# Patient Record
Sex: FEMALE | Race: WHITE | NOT HISPANIC OR LATINO | Employment: OTHER | URBAN - METROPOLITAN AREA
[De-identification: names, ages, dates, MRNs, and addresses within clinical notes are randomized per-mention and may not be internally consistent; named-entity substitution may affect disease eponyms.]

---

## 2017-01-06 ENCOUNTER — TRANSCRIBE ORDERS (OUTPATIENT)
Dept: LAB | Facility: CLINIC | Age: 67
End: 2017-01-06

## 2017-01-06 ENCOUNTER — APPOINTMENT (OUTPATIENT)
Dept: LAB | Facility: CLINIC | Age: 67
End: 2017-01-06
Payer: COMMERCIAL

## 2017-01-06 DIAGNOSIS — Q15.9 EYE ABNORMALITIES: Primary | ICD-10-CM

## 2017-01-06 LAB
ERYTHROCYTE [SEDIMENTATION RATE] IN BLOOD: 10 MM/HOUR (ref 0–20)
TSH SERPL DL<=0.05 MIU/L-ACNC: 1.37 UIU/ML (ref 0.36–3.74)

## 2017-01-06 PROCEDURE — 85652 RBC SED RATE AUTOMATED: CPT

## 2017-01-06 PROCEDURE — 84443 ASSAY THYROID STIM HORMONE: CPT

## 2017-01-06 PROCEDURE — 86618 LYME DISEASE ANTIBODY: CPT

## 2017-01-06 PROCEDURE — 36415 COLL VENOUS BLD VENIPUNCTURE: CPT

## 2017-01-09 LAB
B BURGDOR IGG SER IA-ACNC: 0.09
B BURGDOR IGM SER IA-ACNC: 0.43

## 2017-08-11 ENCOUNTER — TRANSCRIBE ORDERS (OUTPATIENT)
Dept: ADMINISTRATIVE | Facility: HOSPITAL | Age: 67
End: 2017-08-11

## 2017-08-11 DIAGNOSIS — K56.699 OTHER INTESTINAL OBSTRUCTION: Primary | ICD-10-CM

## 2017-09-05 ENCOUNTER — ALLSCRIPTS OFFICE VISIT (OUTPATIENT)
Dept: OTHER | Facility: OTHER | Age: 67
End: 2017-09-05

## 2017-09-05 DIAGNOSIS — Z78.0 ASYMPTOMATIC MENOPAUSAL STATE: ICD-10-CM

## 2017-09-05 DIAGNOSIS — Z12.31 ENCOUNTER FOR SCREENING MAMMOGRAM FOR MALIGNANT NEOPLASM OF BREAST: ICD-10-CM

## 2017-09-06 ENCOUNTER — HOSPITAL ENCOUNTER (OUTPATIENT)
Dept: CT IMAGING | Facility: HOSPITAL | Age: 67
Discharge: HOME | End: 2017-09-06
Payer: COMMERCIAL

## 2017-09-06 ENCOUNTER — HOSPITAL ENCOUNTER (OUTPATIENT)
Dept: RADIOLOGY | Facility: HOSPITAL | Age: 67
Discharge: HOME | End: 2017-09-06
Payer: COMMERCIAL

## 2017-09-06 DIAGNOSIS — K56.699 OTHER INTESTINAL OBSTRUCTION: ICD-10-CM

## 2017-09-06 DIAGNOSIS — K56.609 MECHANICAL ILEUS (HCC): ICD-10-CM

## 2017-09-28 ENCOUNTER — GENERIC CONVERSION - ENCOUNTER (OUTPATIENT)
Dept: OTHER | Facility: OTHER | Age: 67
End: 2017-09-28

## 2017-10-03 ENCOUNTER — GENERIC CONVERSION - ENCOUNTER (OUTPATIENT)
Dept: OTHER | Facility: OTHER | Age: 67
End: 2017-10-03

## 2018-01-13 VITALS
HEIGHT: 60 IN | BODY MASS INDEX: 29.76 KG/M2 | WEIGHT: 151.56 LBS | SYSTOLIC BLOOD PRESSURE: 138 MMHG | DIASTOLIC BLOOD PRESSURE: 88 MMHG

## 2018-09-14 ENCOUNTER — APPOINTMENT (OUTPATIENT)
Dept: LAB | Facility: HOSPITAL | Age: 68
End: 2018-09-14
Payer: MEDICARE

## 2018-09-14 ENCOUNTER — TRANSCRIBE ORDERS (OUTPATIENT)
Dept: ADMINISTRATIVE | Facility: HOSPITAL | Age: 68
End: 2018-09-14

## 2018-09-14 DIAGNOSIS — D50.9 IRON DEFICIENCY ANEMIA, UNSPECIFIED IRON DEFICIENCY ANEMIA TYPE: ICD-10-CM

## 2018-09-14 DIAGNOSIS — E78.5 HYPERLIPIDEMIA, UNSPECIFIED HYPERLIPIDEMIA TYPE: ICD-10-CM

## 2018-09-14 DIAGNOSIS — I25.119 ATHEROSCLEROSIS OF CORONARY ARTERY WITH ANGINA PECTORIS, UNSPECIFIED VESSEL OR LESION TYPE, UNSPECIFIED WHETHER NATIVE OR TRANSPLANTED HEART (HCC): ICD-10-CM

## 2018-09-14 DIAGNOSIS — R53.1 ASTHENIA: Primary | ICD-10-CM

## 2018-09-14 DIAGNOSIS — I50.9 CONGESTIVE HEART FAILURE, UNSPECIFIED HF CHRONICITY, UNSPECIFIED HEART FAILURE TYPE (HCC): ICD-10-CM

## 2018-09-14 DIAGNOSIS — E66.3 OVER WEIGHT: ICD-10-CM

## 2018-09-14 DIAGNOSIS — E55.9 VITAMIN D DEFICIENCY DISEASE: ICD-10-CM

## 2018-09-14 DIAGNOSIS — E34.9 ENDOCRINE DISTURBANCE: ICD-10-CM

## 2018-09-14 DIAGNOSIS — R53.81 MALAISE: ICD-10-CM

## 2018-09-14 DIAGNOSIS — R53.1 ASTHENIA: ICD-10-CM

## 2018-09-14 DIAGNOSIS — D64.9 ANEMIA, UNSPECIFIED TYPE: ICD-10-CM

## 2018-09-14 DIAGNOSIS — Z83.3 FAMILY HISTORY OF DIABETES MELLITUS: ICD-10-CM

## 2018-09-14 DIAGNOSIS — E88.9 METABOLIC DISORDER: ICD-10-CM

## 2018-09-14 DIAGNOSIS — Z13.228 SCREENING FOR METABOLIC DISORDER: ICD-10-CM

## 2018-09-14 DIAGNOSIS — R94.7 ABNORMAL RESULTS OF OTHER ENDOCRINE FUNCTION STUDIES: ICD-10-CM

## 2018-09-14 DIAGNOSIS — E03.9 HYPOTHYROIDISM, UNSPECIFIED TYPE: ICD-10-CM

## 2018-09-14 DIAGNOSIS — E78.49 OTHER HYPERLIPIDEMIA: ICD-10-CM

## 2018-09-14 DIAGNOSIS — R53.83 OTHER FATIGUE: ICD-10-CM

## 2018-09-14 DIAGNOSIS — Z13.1 SCREENING FOR DIABETES MELLITUS: ICD-10-CM

## 2018-09-14 DIAGNOSIS — Z13.21 ENCOUNTER FOR SCREENING FOR NUTRITIONAL DISORDER: ICD-10-CM

## 2018-09-14 DIAGNOSIS — R94.110: ICD-10-CM

## 2018-09-14 DIAGNOSIS — IMO0002 UNCONTROLLED TYPE 2 DIABETES MELLITUS WITH COMPLICATION, WITHOUT LONG-TERM CURRENT USE OF INSULIN: ICD-10-CM

## 2018-09-14 DIAGNOSIS — I10 ESSENTIAL HYPERTENSION, MALIGNANT: ICD-10-CM

## 2018-09-14 DIAGNOSIS — R73.01 IMPAIRED FASTING GLUCOSE: ICD-10-CM

## 2018-09-14 DIAGNOSIS — Z13.29 SCREENING FOR ENDOCRINE DISORDER: ICD-10-CM

## 2018-09-14 LAB
25(OH)D3 SERPL-MCNC: 26.6 NG/ML (ref 30–100)
ALBUMIN SERPL BCP-MCNC: 4.3 G/DL (ref 3.5–5.7)
ALP SERPL-CCNC: 61 U/L (ref 55–165)
ALT SERPL W P-5'-P-CCNC: 24 U/L (ref 7–52)
ANION GAP SERPL CALCULATED.3IONS-SCNC: 6 MMOL/L (ref 4–13)
AST SERPL W P-5'-P-CCNC: 22 U/L (ref 13–39)
BILIRUB SERPL-MCNC: 0.3 MG/DL (ref 0.2–1)
BUN SERPL-MCNC: 14 MG/DL (ref 7–25)
CALCIUM SERPL-MCNC: 10.1 MG/DL (ref 8.6–10.5)
CEA SERPL-MCNC: 2.8 NG/ML (ref 0–3)
CHLORIDE SERPL-SCNC: 103 MMOL/L (ref 98–107)
CHOLEST SERPL-MCNC: 194 MG/DL (ref 0–200)
CO2 SERPL-SCNC: 31 MMOL/L (ref 21–31)
CORTIS SERPL-MCNC: 7 UG/DL
CREAT SERPL-MCNC: 0.69 MG/DL (ref 0.6–1.2)
CRP SERPL HS-MCNC: 6.97 MG/L
DEPRECATED D DIMER PPP: 588 NG/ML (FEU)
ERYTHROCYTE [DISTWIDTH] IN BLOOD BY AUTOMATED COUNT: 15.4 % (ref 11.5–14.5)
ERYTHROCYTE [SEDIMENTATION RATE] IN BLOOD: 12 MM/HOUR (ref 0–30)
EST. AVERAGE GLUCOSE BLD GHB EST-MCNC: 111 MG/DL
FERRITIN SERPL-MCNC: 8 NG/ML (ref 8–388)
FOLATE SERPL-MCNC: >20 NG/ML (ref 3.1–17.5)
GFR SERPL CREATININE-BSD FRML MDRD: 92 ML/MIN/1.73SQ M
GLUCOSE P FAST SERPL-MCNC: 95 MG/DL (ref 65–99)
HBA1C MFR BLD: 5.5 % (ref 4.2–6.3)
HCT VFR BLD AUTO: 46 % (ref 34.8–46.1)
HCYS SERPL-SCNC: 9.4 UMOL/L (ref 3.7–11.2)
HDLC SERPL-MCNC: 66 MG/DL (ref 40–60)
HGB BLD-MCNC: 14.7 G/DL (ref 12–16)
INSULIN SERPL-ACNC: 11.2 MU/L (ref 3–25)
IRON SATN MFR SERPL: 15 %
IRON SERPL-MCNC: 56 UG/DL (ref 50–170)
LDLC SERPL CALC-MCNC: 100 MG/DL (ref 75–193)
LDLC SERPL DIRECT ASSAY-MCNC: 103 MG/DL (ref 75–193)
MCH RBC QN AUTO: 27.4 PG (ref 26–34)
MCHC RBC AUTO-ENTMCNC: 32 G/DL (ref 31–37)
MCV RBC AUTO: 86 FL (ref 81–99)
NONHDLC SERPL-MCNC: 128 MG/DL
PLATELET # BLD AUTO: 316 THOUSANDS/UL (ref 149–390)
PMV BLD AUTO: 9.5 FL (ref 8.6–11.7)
POTASSIUM SERPL-SCNC: 4.4 MMOL/L (ref 3.5–5.5)
PROT SERPL-MCNC: 7.2 G/DL (ref 6.4–8.9)
RBC # BLD AUTO: 5.38 MILLION/UL (ref 3.9–5.2)
SODIUM SERPL-SCNC: 140 MMOL/L (ref 134–143)
T3 SERPL-MCNC: 1.1 NG/ML (ref 0.6–1.8)
T3FREE SERPL-MCNC: 2.67 PG/ML (ref 2.3–4.2)
T4 SERPL-MCNC: 9.3 UG/DL (ref 4.7–13.3)
TIBC SERPL-MCNC: 379 UG/DL (ref 250–450)
TRIGL SERPL-MCNC: 140 MG/DL (ref 44–166)
TSH SERPL DL<=0.05 MIU/L-ACNC: 0.6 UIU/ML (ref 0.45–5.33)
URATE SERPL-MCNC: 4 MG/DL (ref 2.3–7.6)
VIT B12 SERPL-MCNC: 1988 PG/ML (ref 100–900)
WBC # BLD AUTO: 7.1 THOUSAND/UL (ref 4.8–10.8)

## 2018-09-14 PROCEDURE — 84436 ASSAY OF TOTAL THYROXINE: CPT

## 2018-09-14 PROCEDURE — 85652 RBC SED RATE AUTOMATED: CPT

## 2018-09-14 PROCEDURE — 83090 ASSAY OF HOMOCYSTEINE: CPT

## 2018-09-14 PROCEDURE — 83721 ASSAY OF BLOOD LIPOPROTEIN: CPT

## 2018-09-14 PROCEDURE — 82728 ASSAY OF FERRITIN: CPT

## 2018-09-14 PROCEDURE — 84481 FREE ASSAY (FT-3): CPT

## 2018-09-14 PROCEDURE — 84479 ASSAY OF THYROID (T3 OR T4): CPT

## 2018-09-14 PROCEDURE — 85027 COMPLETE CBC AUTOMATED: CPT

## 2018-09-14 PROCEDURE — 84270 ASSAY OF SEX HORMONE GLOBUL: CPT

## 2018-09-14 PROCEDURE — 80061 LIPID PANEL: CPT

## 2018-09-14 PROCEDURE — 82306 VITAMIN D 25 HYDROXY: CPT

## 2018-09-14 PROCEDURE — 83540 ASSAY OF IRON: CPT

## 2018-09-14 PROCEDURE — 83550 IRON BINDING TEST: CPT

## 2018-09-14 PROCEDURE — 84480 ASSAY TRIIODOTHYRONINE (T3): CPT

## 2018-09-14 PROCEDURE — 82533 TOTAL CORTISOL: CPT

## 2018-09-14 PROCEDURE — 80053 COMPREHEN METABOLIC PANEL: CPT

## 2018-09-14 PROCEDURE — 85379 FIBRIN DEGRADATION QUANT: CPT

## 2018-09-14 PROCEDURE — 83520 IMMUNOASSAY QUANT NOS NONAB: CPT

## 2018-09-14 PROCEDURE — 83525 ASSAY OF INSULIN: CPT

## 2018-09-14 PROCEDURE — 83036 HEMOGLOBIN GLYCOSYLATED A1C: CPT

## 2018-09-14 PROCEDURE — 86141 C-REACTIVE PROTEIN HS: CPT

## 2018-09-14 PROCEDURE — 84443 ASSAY THYROID STIM HORMONE: CPT

## 2018-09-14 PROCEDURE — 82378 CARCINOEMBRYONIC ANTIGEN: CPT

## 2018-09-14 PROCEDURE — 82607 VITAMIN B-12: CPT

## 2018-09-14 PROCEDURE — 84681 ASSAY OF C-PEPTIDE: CPT

## 2018-09-14 PROCEDURE — 82746 ASSAY OF FOLIC ACID SERUM: CPT

## 2018-09-14 PROCEDURE — 84550 ASSAY OF BLOOD/URIC ACID: CPT

## 2018-09-14 PROCEDURE — 36415 COLL VENOUS BLD VENIPUNCTURE: CPT

## 2018-09-15 LAB
C PEPTIDE SERPL-MCNC: 2.8 NG/ML (ref 1.1–4.4)
SHBG SERPL-SCNC: 108.6 NMOL/L (ref 17.3–125)
T3RU NFR SERPL: 28 % (ref 24–39)

## 2018-09-17 LAB — LEPTIN SERPL-MCNC: 42.4 NG/ML

## 2018-09-27 ENCOUNTER — ANNUAL EXAM (OUTPATIENT)
Dept: OBGYN CLINIC | Facility: CLINIC | Age: 68
End: 2018-09-27
Payer: MEDICARE

## 2018-09-27 VITALS
SYSTOLIC BLOOD PRESSURE: 146 MMHG | BODY MASS INDEX: 30.72 KG/M2 | DIASTOLIC BLOOD PRESSURE: 94 MMHG | HEIGHT: 61 IN | WEIGHT: 162.7 LBS

## 2018-09-27 DIAGNOSIS — Z01.419 ENCOUNTER FOR GYNECOLOGICAL EXAMINATION: Primary | ICD-10-CM

## 2018-09-27 DIAGNOSIS — Z12.31 ENCOUNTER FOR SCREENING MAMMOGRAM FOR MALIGNANT NEOPLASM OF BREAST: ICD-10-CM

## 2018-09-27 DIAGNOSIS — Z13.820 ENCOUNTER FOR SCREENING FOR OSTEOPOROSIS: ICD-10-CM

## 2018-09-27 PROCEDURE — G0101 CA SCREEN;PELVIC/BREAST EXAM: HCPCS | Performed by: OBSTETRICS & GYNECOLOGY

## 2018-09-27 RX ORDER — FLUTICASONE PROPIONATE 50 MCG
SPRAY, SUSPENSION (ML) NASAL
COMMUNITY
Start: 2016-01-13

## 2018-09-27 RX ORDER — CYCLOBENZAPRINE HCL 5 MG
TABLET ORAL
COMMUNITY
Start: 2016-02-08 | End: 2020-07-28 | Stop reason: ALTCHOICE

## 2018-09-27 RX ORDER — VITAMIN B COMPLEX
TABLET ORAL
COMMUNITY

## 2018-09-27 RX ORDER — FENOFIBRATE 130 MG/1
CAPSULE ORAL
COMMUNITY
End: 2020-07-28 | Stop reason: SDUPTHER

## 2018-09-27 RX ORDER — CHLORAL HYDRATE 500 MG
CAPSULE ORAL
COMMUNITY

## 2018-09-27 RX ORDER — ALENDRONATE SODIUM 70 MG/1
70 TABLET ORAL WEEKLY
COMMUNITY
End: 2020-07-28 | Stop reason: ALTCHOICE

## 2018-09-27 RX ORDER — BUTALBITAL, ACETAMINOPHEN AND CAFFEINE 50; 325; 40 MG/1; MG/1; MG/1
TABLET ORAL
COMMUNITY
Start: 2012-10-18 | End: 2020-07-28 | Stop reason: SDUPTHER

## 2018-09-27 RX ORDER — IBANDRONATE SODIUM 150 MG/1
150 TABLET, FILM COATED ORAL
Refills: 3 | COMMUNITY
Start: 2018-07-24 | End: 2020-11-03 | Stop reason: SDUPTHER

## 2018-09-27 RX ORDER — LORAZEPAM 1 MG/1
TABLET ORAL
COMMUNITY
Start: 2016-02-25 | End: 2019-11-01 | Stop reason: SDUPTHER

## 2018-09-27 RX ORDER — EZETIMIBE AND SIMVASTATIN 10; 40 MG/1; MG/1
TABLET ORAL
COMMUNITY
End: 2020-07-28 | Stop reason: SDUPTHER

## 2018-09-27 RX ORDER — CHOLECALCIFEROL (VITAMIN D3) 10 MCG (400 UNIT) TABLET: COMMUNITY

## 2018-09-27 RX ORDER — NAPROXEN 500 MG/1
TABLET ORAL
COMMUNITY
Start: 2016-02-08

## 2018-09-27 RX ORDER — MONTELUKAST SODIUM 10 MG/1
TABLET ORAL AS NEEDED
COMMUNITY
Start: 2016-01-13 | End: 2022-01-26 | Stop reason: ALTCHOICE

## 2018-09-27 RX ORDER — PHENTERMINE HYDROCHLORIDE 37.5 MG/1
TABLET ORAL
Refills: 3 | COMMUNITY
Start: 2018-09-04 | End: 2020-07-28 | Stop reason: SDUPTHER

## 2018-09-27 RX ORDER — SUMATRIPTAN 100 MG/1
100 TABLET, FILM COATED ORAL
COMMUNITY
Start: 2012-12-18 | End: 2020-07-28 | Stop reason: ALTCHOICE

## 2018-09-27 RX ORDER — LEVOTHYROXINE SODIUM 0.05 MG/1
50 TABLET ORAL
COMMUNITY
End: 2020-07-28 | Stop reason: SDUPTHER

## 2018-09-27 NOTE — PROGRESS NOTES
ASSESSMENT & PLAN: Eboni Seen is a 74 y o  Dov Mares with normal gynecologic exam     1   Routine well woman exam done today  2  Pap and HPV:  The patient's last pap and hpv was years ago   It was normal     Pap and cotesting was not done today  Current ASCCP Guidelines reviewed  3   Mammogram ordered  4  Colonoscopy   5  DEXA ordered  6  The following were reviewed in today's visit: breast self exam, mammography screening ordered, menopause, osteoporosis, exercise and healthy diet  CC:  Annual Gynecologic Examination    HPI: Eboni Seen is a 74 y o  Dov Mares who presents for annual gynecologic examination  She has the following concerns:  None     Health Maintenance:    She wears her seatbelt routinely  She does perform regular monthly self breast exams  She feels safe at home  History reviewed  No pertinent past medical history  Past Surgical History:   Procedure Laterality Date    LEG SURGERY      TOTAL ABDOMINAL HYSTERECTOMY W/ BILATERAL SALPINGOOPHORECTOMY         Past OB/Gyn History:  OB History      Para Term  AB Living    0 0 0 0 0 0    SAB TAB Ectopic Multiple Live Births    0 0 0 0 0          History reviewed  No pertinent family history  Social History:  Social History     Social History    Marital status: /Civil Union     Spouse name: N/A    Number of children: N/A    Years of education: N/A     Occupational History    Not on file       Social History Main Topics    Smoking status: Never Smoker    Smokeless tobacco: Never Used    Alcohol use No    Drug use: No    Sexual activity: Yes     Partners: Male     Other Topics Concern    Not on file     Social History Narrative    No narrative on file         Allergies no known allergies      Current Outpatient Prescriptions:     butalbital-acetaminophen-caffeine (FIORICET,ESGIC) -40 mg per tablet, prn for HA, Disp: , Rfl:     cyclobenzaprine (FLEXERIL) 5 mg tablet, , Disp: , Rfl:     fluticasone (FLONASE) 50 mcg/act nasal spray, , Disp: , Rfl:     LORazepam (ATIVAN) 1 mg tablet, , Disp: , Rfl:     montelukast (SINGULAIR) 10 mg tablet, , Disp: , Rfl:     Multiple Vitamin (MULTI-VITAMIN DAILY PO), once daily, Disp: , Rfl:     naproxen (NAPROSYN) 500 mg tablet, , Disp: , Rfl:     nicotine polacrilex (NICORETTE) 2 mg gum, 2mg per piece 6-8 pieces daily, Disp: , Rfl:     SUMAtriptan (IMITREX) 100 mg tablet, Take 100 mg by mouth, Disp: , Rfl:     alendronate (FOSAMAX) 70 mg tablet, Take 70 mg by mouth Once a week, Disp: , Rfl:     aspirin (ECOTRIN) 325 mg EC tablet, Take by mouth, Disp: , Rfl:     Calcium 200 MG TABS, Take by mouth, Disp: , Rfl:     Cholecalciferol (VITAMIN D PO), Take by mouth, Disp: , Rfl:     Coenzyme Q10 (COQ10) 100 MG CAPS, Take by mouth, Disp: , Rfl:     ezetimibe-simvastatin (VYTORIN) 10-40 mg per tablet, Take by mouth, Disp: , Rfl:     fenofibrate micronized (ANTARA) 130 MG capsule, Take by mouth, Disp: , Rfl:     ibandronate (BONIVA) 150 MG tablet, , Disp: , Rfl: 3    levothyroxine (SYNTHROID) 137 mcg tablet, Take by mouth, Disp: , Rfl:     Magnesium 100 MG TABS, Take by mouth, Disp: , Rfl:     Omega-3 1000 MG CAPS, Take by mouth, Disp: , Rfl:     phentermine (ADIPEX-P) 37 5 MG tablet, , Disp: , Rfl: 3    Potassium Gluconate 80 MG TABS, Take by mouth, Disp: , Rfl:     Review of Systems  Constitutional :no fever, feels well, no tiredness, no recent weight gain or loss  ENT: no ear ache, no loss of hearing, no nosebleeds or nasal discharge, no sore throat or hoarseness  Cardiovascular: no complaints of slow or fast heart beat, no chest pain, no palpitations, no leg claudication or lower extremity edema    Respiratory: no complaints of shortness of shortness of breath, no HUTCHISON  Breasts:no complaints of breast pain, breast lump, or nipple discharge  Gastrointestinal: no complaints of abdominal pain, constipation, nausea, vomiting, or diarrhea or bloody stools  Genitourinary : no complaints of dysuria, incontinence, pelvic pain, no dysmenorrhea, vaginal discharge or abnormal vaginal bleeding and as noted in HPI  Musculoskeletal: no complaints of arthralgia, no myalgia, no joint swelling or stiffness, no limb pain or swelling  Integumentary: no complaints of skin rash or lesion, itching or dry skin  Neurological: no complaints of headache, no confusion, no numbness or tingling, no dizziness or fainting    Objective      /94   Ht 5' 0 5" (1 537 m)   Wt 73 8 kg (162 lb 11 2 oz)   Breastfeeding? No   BMI 31 25 kg/m²   General:   appears stated age, cooperative, alert normal mood and affect   Breasts: normal appearance, no masses or tenderness, Normal to palpation without dominant masses   Abdomen: soft, non-tender, without masses or organomegaly   Vulva: normal   Vagina: normal vagina, no discharge, exudate, lesion, or erythema   Urethra: normal   Cervix: Absent     Uterus: uterus absent   Adnexa: no mass, fullness, tenderness   Psychiatric orientation to person, place, and time: normal  mood and affect: normal

## 2018-09-28 ENCOUNTER — HOSPITAL ENCOUNTER (OUTPATIENT)
Dept: BONE DENSITY | Facility: HOSPITAL | Age: 68
Discharge: HOME | End: 2018-09-28
Payer: MEDICARE

## 2018-09-28 DIAGNOSIS — Z78.0 ASYMPTOMATIC MENOPAUSAL STATE: ICD-10-CM

## 2018-09-28 PROCEDURE — 77080 DXA BONE DENSITY AXIAL: CPT

## 2018-11-09 ENCOUNTER — HOSPITAL ENCOUNTER (OUTPATIENT)
Dept: MAMMOGRAPHY | Facility: HOSPITAL | Age: 68
Discharge: HOME | End: 2018-11-09
Payer: MEDICARE

## 2018-11-09 DIAGNOSIS — Z12.31 ENCOUNTER FOR SCREENING MAMMOGRAM FOR MALIGNANT NEOPLASM OF BREAST: ICD-10-CM

## 2018-11-09 PROCEDURE — 77067 SCR MAMMO BI INCL CAD: CPT

## 2018-11-09 PROCEDURE — 77063 BREAST TOMOSYNTHESIS BI: CPT

## 2019-01-07 ENCOUNTER — APPOINTMENT (OUTPATIENT)
Dept: LAB | Facility: HOSPITAL | Age: 69
End: 2019-01-07
Payer: MEDICARE

## 2019-01-07 ENCOUNTER — TRANSCRIBE ORDERS (OUTPATIENT)
Dept: ADMINISTRATIVE | Facility: HOSPITAL | Age: 69
End: 2019-01-07

## 2019-01-07 DIAGNOSIS — Z13.1 SCREENING FOR DIABETES MELLITUS: ICD-10-CM

## 2019-01-07 DIAGNOSIS — I25.10 ATHEROSCLEROSIS OF NATIVE CORONARY ARTERY WITHOUT ANGINA PECTORIS, UNSPECIFIED WHETHER NATIVE OR TRANSPLANTED HEART: ICD-10-CM

## 2019-01-07 DIAGNOSIS — E55.9 VITAMIN D DEFICIENCY DISEASE: ICD-10-CM

## 2019-01-07 DIAGNOSIS — Z13.21 SCREENING FOR MALNUTRITION: ICD-10-CM

## 2019-01-07 DIAGNOSIS — E11.8 TYPE 2 DIABETES MELLITUS WITH COMPLICATION, UNSPECIFIED WHETHER LONG TERM INSULIN USE: ICD-10-CM

## 2019-01-07 DIAGNOSIS — E34.9 ENDOCRINE DISEASE: ICD-10-CM

## 2019-01-07 DIAGNOSIS — D50.9 IRON DEFICIENCY ANEMIA, UNSPECIFIED IRON DEFICIENCY ANEMIA TYPE: ICD-10-CM

## 2019-01-07 DIAGNOSIS — R79.89 ELEVATED D-DIMER: ICD-10-CM

## 2019-01-07 DIAGNOSIS — E66.3 OVER WEIGHT: ICD-10-CM

## 2019-01-07 DIAGNOSIS — D64.9 ANEMIA, UNSPECIFIED TYPE: ICD-10-CM

## 2019-01-07 DIAGNOSIS — R53.81 MALAISE: ICD-10-CM

## 2019-01-07 DIAGNOSIS — R94.7 ABNORMAL RESULTS OF OTHER ENDOCRINE FUNCTION STUDIES: ICD-10-CM

## 2019-01-07 DIAGNOSIS — E78.41 ELEVATED LIPOPROTEIN A LEVEL: ICD-10-CM

## 2019-01-07 DIAGNOSIS — E78.5 HYPERLIPIDEMIA, UNSPECIFIED HYPERLIPIDEMIA TYPE: ICD-10-CM

## 2019-01-07 DIAGNOSIS — R53.1 WEAKNESS GENERALIZED: ICD-10-CM

## 2019-01-07 DIAGNOSIS — Z13.228 ENCOUNTER FOR SCREENING FOR OTHER METABOLIC DISORDERS: ICD-10-CM

## 2019-01-07 DIAGNOSIS — I10 HYPERTENSION, UNSPECIFIED TYPE: ICD-10-CM

## 2019-01-07 DIAGNOSIS — R53.83 FATIGUE, UNSPECIFIED TYPE: ICD-10-CM

## 2019-01-07 DIAGNOSIS — E88.9 METABOLIC DISORDER: ICD-10-CM

## 2019-01-07 DIAGNOSIS — R53.1 WEAKNESS GENERALIZED: Primary | ICD-10-CM

## 2019-01-07 DIAGNOSIS — I50.9 HEART FAILURE, UNSPECIFIED HF CHRONICITY, UNSPECIFIED HEART FAILURE TYPE (HCC): ICD-10-CM

## 2019-01-07 DIAGNOSIS — R94.6 NONSPECIFIC ABNORMAL RESULTS OF THYROID FUNCTION STUDY: ICD-10-CM

## 2019-01-07 DIAGNOSIS — E03.9 ACQUIRED HYPOTHYROIDISM: ICD-10-CM

## 2019-01-07 DIAGNOSIS — R79.82 CRP ELEVATED: ICD-10-CM

## 2019-01-07 LAB
25(OH)D3 SERPL-MCNC: 34.9 NG/ML (ref 30–100)
ALBUMIN SERPL BCP-MCNC: 4.4 G/DL (ref 3.5–5.7)
ALP SERPL-CCNC: 67 U/L (ref 55–165)
ALT SERPL W P-5'-P-CCNC: 22 U/L (ref 7–52)
ANION GAP SERPL CALCULATED.3IONS-SCNC: 9 MMOL/L (ref 4–13)
AST SERPL W P-5'-P-CCNC: 18 U/L (ref 13–39)
BASOPHILS # BLD AUTO: 0 THOUSANDS/ΜL (ref 0–0.1)
BASOPHILS NFR BLD AUTO: 0 % (ref 0–2)
BILIRUB SERPL-MCNC: 0.4 MG/DL (ref 0.2–1)
BUN SERPL-MCNC: 13 MG/DL (ref 7–25)
CALCIUM SERPL-MCNC: 9.9 MG/DL (ref 8.6–10.5)
CEA SERPL-MCNC: 2.3 NG/ML (ref 0–3)
CHLORIDE SERPL-SCNC: 106 MMOL/L (ref 98–107)
CHOLEST SERPL-MCNC: 151 MG/DL (ref 0–200)
CO2 SERPL-SCNC: 27 MMOL/L (ref 21–31)
CORTIS SERPL-MCNC: 22 UG/DL
CREAT SERPL-MCNC: 0.82 MG/DL (ref 0.6–1.2)
CRP SERPL HS-MCNC: 3.76 MG/L
DEPRECATED D DIMER PPP: 669 NG/ML (FEU)
EOSINOPHIL # BLD AUTO: 0.2 THOUSAND/ΜL (ref 0–0.61)
EOSINOPHIL NFR BLD AUTO: 3 % (ref 0–5)
ERYTHROCYTE [DISTWIDTH] IN BLOOD BY AUTOMATED COUNT: 16.5 % (ref 11.5–14.5)
ERYTHROCYTE [SEDIMENTATION RATE] IN BLOOD: 8 MM/HOUR (ref 0–30)
EST. AVERAGE GLUCOSE BLD GHB EST-MCNC: 123 MG/DL
FERRITIN SERPL-MCNC: 11 NG/ML (ref 8–388)
FOLATE SERPL-MCNC: >20 NG/ML (ref 3.1–17.5)
GFR SERPL CREATININE-BSD FRML MDRD: 75 ML/MIN/1.73SQ M
GLUCOSE P FAST SERPL-MCNC: 87 MG/DL (ref 65–99)
HBA1C MFR BLD: 5.9 % (ref 4.2–6.3)
HCT VFR BLD AUTO: 45.2 % (ref 34.8–46.1)
HCYS SERPL-SCNC: 8.5 UMOL/L (ref 3.7–11.2)
HDLC SERPL-MCNC: 61 MG/DL (ref 40–60)
HGB BLD-MCNC: 14.7 G/DL (ref 12–16)
INSULIN SERPL-ACNC: 8.2 MU/L (ref 3–25)
IRON SERPL-MCNC: 108 UG/DL (ref 50–170)
LDLC SERPL CALC-MCNC: 62 MG/DL (ref 75–193)
LDLC SERPL DIRECT ASSAY-MCNC: 64 MG/DL (ref 75–193)
LG PLATELETS BLD QL SMEAR: PRESENT
LYMPHOCYTES # BLD AUTO: 1.4 THOUSANDS/ΜL (ref 0.6–4.47)
LYMPHOCYTES NFR BLD AUTO: 21 % (ref 21–51)
MCH RBC QN AUTO: 28.5 PG (ref 26–34)
MCHC RBC AUTO-ENTMCNC: 32.6 G/DL (ref 31–37)
MCV RBC AUTO: 87 FL (ref 81–99)
MONOCYTES # BLD AUTO: 0.5 THOUSAND/ΜL (ref 0.17–1.22)
MONOCYTES NFR BLD AUTO: 7 % (ref 2–12)
NEUTROPHILS # BLD AUTO: 4.4 THOUSANDS/ΜL (ref 1.4–6.5)
NEUTS SEG NFR BLD AUTO: 68 % (ref 42–75)
NONHDLC SERPL-MCNC: 90 MG/DL
NRBC BLD AUTO-RTO: 0 /100 WBCS
PLATELET # BLD AUTO: 301 THOUSANDS/UL (ref 149–390)
PLATELET BLD QL SMEAR: ADEQUATE
PMV BLD AUTO: 9.7 FL (ref 8.6–11.7)
POTASSIUM SERPL-SCNC: 3.6 MMOL/L (ref 3.5–5.5)
PROT SERPL-MCNC: 7.3 G/DL (ref 6.4–8.9)
RBC # BLD AUTO: 5.17 MILLION/UL (ref 3.9–5.2)
RBC MORPH BLD: NORMAL
SODIUM SERPL-SCNC: 142 MMOL/L (ref 134–143)
T3 SERPL-MCNC: 1 NG/ML (ref 0.6–1.8)
T3FREE SERPL-MCNC: 2.75 PG/ML (ref 2.3–4.2)
T4 FREE SERPL-MCNC: 0.97 NG/DL (ref 0.76–1.46)
T4 SERPL-MCNC: 8.2 UG/DL (ref 4.7–13.3)
TRIGL SERPL-MCNC: 142 MG/DL (ref 44–166)
TSH SERPL DL<=0.05 MIU/L-ACNC: 1.77 UIU/ML (ref 0.45–5.33)
URATE SERPL-MCNC: 3.5 MG/DL (ref 2.3–7.6)
VIT B12 SERPL-MCNC: 2398 PG/ML (ref 100–900)
WBC # BLD AUTO: 6.4 THOUSAND/UL (ref 4.8–10.8)

## 2019-01-07 PROCEDURE — 84479 ASSAY OF THYROID (T3 OR T4): CPT

## 2019-01-07 PROCEDURE — 80061 LIPID PANEL: CPT

## 2019-01-07 PROCEDURE — 84681 ASSAY OF C-PEPTIDE: CPT

## 2019-01-07 PROCEDURE — 84270 ASSAY OF SEX HORMONE GLOBUL: CPT

## 2019-01-07 PROCEDURE — 84443 ASSAY THYROID STIM HORMONE: CPT

## 2019-01-07 PROCEDURE — 36415 COLL VENOUS BLD VENIPUNCTURE: CPT

## 2019-01-07 PROCEDURE — 82306 VITAMIN D 25 HYDROXY: CPT

## 2019-01-07 PROCEDURE — 85025 COMPLETE CBC W/AUTO DIFF WBC: CPT

## 2019-01-07 PROCEDURE — 83036 HEMOGLOBIN GLYCOSYLATED A1C: CPT

## 2019-01-07 PROCEDURE — 83090 ASSAY OF HOMOCYSTEINE: CPT

## 2019-01-07 PROCEDURE — 82728 ASSAY OF FERRITIN: CPT

## 2019-01-07 PROCEDURE — 82607 VITAMIN B-12: CPT

## 2019-01-07 PROCEDURE — 85379 FIBRIN DEGRADATION QUANT: CPT

## 2019-01-07 PROCEDURE — 83520 IMMUNOASSAY QUANT NOS NONAB: CPT

## 2019-01-07 PROCEDURE — 84550 ASSAY OF BLOOD/URIC ACID: CPT

## 2019-01-07 PROCEDURE — 83540 ASSAY OF IRON: CPT

## 2019-01-07 PROCEDURE — 82378 CARCINOEMBRYONIC ANTIGEN: CPT

## 2019-01-07 PROCEDURE — 84481 FREE ASSAY (FT-3): CPT

## 2019-01-07 PROCEDURE — 86141 C-REACTIVE PROTEIN HS: CPT

## 2019-01-07 PROCEDURE — 80053 COMPREHEN METABOLIC PANEL: CPT

## 2019-01-07 PROCEDURE — 84480 ASSAY TRIIODOTHYRONINE (T3): CPT

## 2019-01-07 PROCEDURE — 85652 RBC SED RATE AUTOMATED: CPT

## 2019-01-07 PROCEDURE — 82533 TOTAL CORTISOL: CPT

## 2019-01-07 PROCEDURE — 84439 ASSAY OF FREE THYROXINE: CPT

## 2019-01-07 PROCEDURE — 83721 ASSAY OF BLOOD LIPOPROTEIN: CPT

## 2019-01-07 PROCEDURE — 83525 ASSAY OF INSULIN: CPT

## 2019-01-07 PROCEDURE — 82746 ASSAY OF FOLIC ACID SERUM: CPT

## 2019-01-08 LAB
C PEPTIDE SERPL-MCNC: 2.4 NG/ML (ref 1.1–4.4)
SHBG SERPL-SCNC: 125.9 NMOL/L (ref 17.3–125)
T3RU NFR SERPL: 30 % (ref 24–39)

## 2019-01-09 LAB — LEPTIN SERPL-MCNC: 33.9 NG/ML

## 2019-01-17 ENCOUNTER — HOSPITAL ENCOUNTER (OUTPATIENT)
Dept: NON INVASIVE DIAGNOSTICS | Facility: HOSPITAL | Age: 69
Discharge: HOME | End: 2019-01-17
Payer: MEDICARE

## 2019-01-17 DIAGNOSIS — R79.89 ELEVATED D-DIMER: ICD-10-CM

## 2019-01-17 PROCEDURE — 93970 EXTREMITY STUDY: CPT | Performed by: SURGERY

## 2019-01-17 PROCEDURE — 93970 EXTREMITY STUDY: CPT

## 2019-01-23 LAB
Lab: NORMAL
MISCELLANEOUS LAB TEST RESULT: NORMAL

## 2019-04-11 ENCOUNTER — APPOINTMENT (OUTPATIENT)
Dept: LAB | Facility: HOSPITAL | Age: 69
End: 2019-04-11
Payer: MEDICARE

## 2019-04-11 ENCOUNTER — TRANSCRIBE ORDERS (OUTPATIENT)
Dept: ADMINISTRATIVE | Facility: HOSPITAL | Age: 69
End: 2019-04-11

## 2019-04-11 DIAGNOSIS — R63.5 ABNORMAL WEIGHT GAIN: ICD-10-CM

## 2019-04-11 DIAGNOSIS — E66.3 OVER WEIGHT: ICD-10-CM

## 2019-04-11 DIAGNOSIS — Z13.1 SCREENING FOR DIABETES MELLITUS: ICD-10-CM

## 2019-04-11 DIAGNOSIS — R94.7 NONSPECIFIC ABNORMAL RESULTS OF ENDOCRINE FUNCTION STUDY: ICD-10-CM

## 2019-04-11 DIAGNOSIS — Z13.228 SCREENING FOR METABOLIC DISORDER: ICD-10-CM

## 2019-04-11 DIAGNOSIS — E66.9 OBESITY, UNSPECIFIED CLASSIFICATION, UNSPECIFIED OBESITY TYPE, UNSPECIFIED WHETHER SERIOUS COMORBIDITY PRESENT: ICD-10-CM

## 2019-04-11 DIAGNOSIS — E11.8 DIABETIC COMPLICATION (HCC): ICD-10-CM

## 2019-04-11 DIAGNOSIS — Z13.21 ENCOUNTER FOR SCREENING FOR NUTRITIONAL DISORDER: ICD-10-CM

## 2019-04-11 DIAGNOSIS — E88.9 METABOLIC DISORDER: ICD-10-CM

## 2019-04-11 DIAGNOSIS — D50.9 IRON DEFICIENCY ANEMIA, UNSPECIFIED IRON DEFICIENCY ANEMIA TYPE: ICD-10-CM

## 2019-04-11 DIAGNOSIS — R53.81 MALAISE: Primary | ICD-10-CM

## 2019-04-11 DIAGNOSIS — D64.9 ANEMIA, UNSPECIFIED TYPE: ICD-10-CM

## 2019-04-11 DIAGNOSIS — R73.09 OTHER ABNORMAL GLUCOSE: ICD-10-CM

## 2019-04-11 DIAGNOSIS — I25.10 ATHEROSCLEROSIS OF CORONARY ARTERY WITHOUT ANGINA PECTORIS, UNSPECIFIED VESSEL OR LESION TYPE, UNSPECIFIED WHETHER NATIVE OR TRANSPLANTED HEART: ICD-10-CM

## 2019-04-11 DIAGNOSIS — E78.5 HYPERLIPIDEMIA, UNSPECIFIED HYPERLIPIDEMIA TYPE: ICD-10-CM

## 2019-04-11 DIAGNOSIS — Z83.3 FAMILY HISTORY OF DIABETES MELLITUS: ICD-10-CM

## 2019-04-11 DIAGNOSIS — R73.01 IMPAIRED FASTING GLUCOSE: ICD-10-CM

## 2019-04-11 DIAGNOSIS — E78.49 OTHER HYPERLIPIDEMIA: ICD-10-CM

## 2019-04-11 DIAGNOSIS — R74.8 ABNORMAL LEVELS OF OTHER SERUM ENZYMES: ICD-10-CM

## 2019-04-11 DIAGNOSIS — E34.9 ENDOCRINE PROBLEM: ICD-10-CM

## 2019-04-11 DIAGNOSIS — E03.9 HYPOTHYROIDISM, ADULT: ICD-10-CM

## 2019-04-11 DIAGNOSIS — R94.6 NONSPECIFIC ABNORMAL RESULTS OF THYROID FUNCTION STUDY: ICD-10-CM

## 2019-04-11 DIAGNOSIS — R53.81 MALAISE: ICD-10-CM

## 2019-04-11 DIAGNOSIS — R53.83 FATIGUE, UNSPECIFIED TYPE: ICD-10-CM

## 2019-04-11 DIAGNOSIS — R79.82 ELEVATED C-REACTIVE PROTEIN (CRP): ICD-10-CM

## 2019-04-11 DIAGNOSIS — E55.9 VITAMIN D DEFICIENCY, UNSPECIFIED: ICD-10-CM

## 2019-04-11 DIAGNOSIS — Z79.01 LONG TERM (CURRENT) USE OF ANTICOAGULANTS: ICD-10-CM

## 2019-04-11 LAB
25(OH)D3 SERPL-MCNC: 41.1 NG/ML (ref 30–100)
ALBUMIN SERPL BCP-MCNC: 4.3 G/DL (ref 3.5–5.7)
ALP SERPL-CCNC: 55 U/L (ref 55–165)
ALT SERPL W P-5'-P-CCNC: 21 U/L (ref 7–52)
AMYLASE SERPL-CCNC: 48 IU/L (ref 29–103)
ANION GAP SERPL CALCULATED.3IONS-SCNC: 7 MMOL/L (ref 4–13)
AST SERPL W P-5'-P-CCNC: 18 U/L (ref 13–39)
BILIRUB SERPL-MCNC: 0.4 MG/DL (ref 0.2–1)
BNP SERPL-MCNC: 24 PG/ML (ref 1–100)
BUN SERPL-MCNC: 19 MG/DL (ref 7–25)
CALCIUM SERPL-MCNC: 9.6 MG/DL (ref 8.6–10.5)
CHLORIDE SERPL-SCNC: 104 MMOL/L (ref 98–107)
CHOLEST SERPL-MCNC: 139 MG/DL (ref 0–200)
CO2 SERPL-SCNC: 28 MMOL/L (ref 21–31)
CREAT SERPL-MCNC: 0.82 MG/DL (ref 0.6–1.2)
CRP SERPL HS-MCNC: 5.27 MG/L
DEPRECATED D DIMER PPP: 506 NG/ML (FEU)
ERYTHROCYTE [DISTWIDTH] IN BLOOD BY AUTOMATED COUNT: 14.9 % (ref 11.5–14.5)
ERYTHROCYTE [SEDIMENTATION RATE] IN BLOOD: 4 MM/HOUR (ref 0–30)
EST. AVERAGE GLUCOSE BLD GHB EST-MCNC: 103 MG/DL
FERRITIN SERPL-MCNC: 15 NG/ML (ref 8–388)
FOLATE SERPL-MCNC: >20 NG/ML (ref 3.1–17.5)
GFR SERPL CREATININE-BSD FRML MDRD: 75 ML/MIN/1.73SQ M
GLUCOSE P FAST SERPL-MCNC: 94 MG/DL (ref 65–99)
HBA1C MFR BLD: 5.2 % (ref 4.2–6.3)
HCT VFR BLD AUTO: 46.7 % (ref 42–47)
HCYS SERPL-SCNC: 7.9 UMOL/L (ref 3.7–11.2)
HDLC SERPL-MCNC: 62 MG/DL (ref 40–60)
HGB BLD-MCNC: 15.6 G/DL (ref 12–16)
INSULIN SERPL-ACNC: 7.1 MU/L (ref 3–25)
IRON SERPL-MCNC: 126 UG/DL (ref 50–170)
LDLC SERPL CALC-MCNC: 62 MG/DL (ref 0–100)
LDLC SERPL DIRECT ASSAY-MCNC: 57.8 MG/DL (ref 0–100)
MCH RBC QN AUTO: 30.1 PG (ref 26–34)
MCHC RBC AUTO-ENTMCNC: 33.4 G/DL (ref 31–37)
MCV RBC AUTO: 90 FL (ref 81–99)
NONHDLC SERPL-MCNC: 77 MG/DL
PLATELET # BLD AUTO: 257 THOUSANDS/UL (ref 149–390)
PMV BLD AUTO: 9.2 FL (ref 8.6–11.7)
POTASSIUM SERPL-SCNC: 4.5 MMOL/L (ref 3.5–5.5)
PROT SERPL-MCNC: 6.8 G/DL (ref 6.4–8.9)
RBC # BLD AUTO: 5.18 MILLION/UL (ref 3.9–5.2)
SODIUM SERPL-SCNC: 139 MMOL/L (ref 134–143)
T3 SERPL-MCNC: 1 NG/ML (ref 0.6–1.8)
T3FREE SERPL-MCNC: 2.84 PG/ML (ref 2.3–4.2)
T4 FREE SERPL-MCNC: 1.01 NG/DL (ref 0.76–1.46)
T4 SERPL-MCNC: 7.7 UG/DL (ref 4.7–13.3)
TRIGL SERPL-MCNC: 76 MG/DL (ref 44–166)
TSH SERPL DL<=0.05 MIU/L-ACNC: 1.01 UIU/ML (ref 0.45–5.33)
URATE SERPL-MCNC: 4.2 MG/DL (ref 2.3–7.6)
VIT B12 SERPL-MCNC: 1952 PG/ML (ref 100–900)
WBC # BLD AUTO: 5.8 THOUSAND/UL (ref 4.8–10.8)

## 2019-04-11 PROCEDURE — 83036 HEMOGLOBIN GLYCOSYLATED A1C: CPT

## 2019-04-11 PROCEDURE — 85027 COMPLETE CBC AUTOMATED: CPT

## 2019-04-11 PROCEDURE — 80061 LIPID PANEL: CPT

## 2019-04-11 PROCEDURE — 84550 ASSAY OF BLOOD/URIC ACID: CPT

## 2019-04-11 PROCEDURE — 83520 IMMUNOASSAY QUANT NOS NONAB: CPT

## 2019-04-11 PROCEDURE — 82607 VITAMIN B-12: CPT

## 2019-04-11 PROCEDURE — 82306 VITAMIN D 25 HYDROXY: CPT

## 2019-04-11 PROCEDURE — 84479 ASSAY OF THYROID (T3 OR T4): CPT

## 2019-04-11 PROCEDURE — 84481 FREE ASSAY (FT-3): CPT

## 2019-04-11 PROCEDURE — 83721 ASSAY OF BLOOD LIPOPROTEIN: CPT

## 2019-04-11 PROCEDURE — 83880 ASSAY OF NATRIURETIC PEPTIDE: CPT

## 2019-04-11 PROCEDURE — 83090 ASSAY OF HOMOCYSTEINE: CPT

## 2019-04-11 PROCEDURE — 85379 FIBRIN DEGRADATION QUANT: CPT

## 2019-04-11 PROCEDURE — 85652 RBC SED RATE AUTOMATED: CPT

## 2019-04-11 PROCEDURE — 83525 ASSAY OF INSULIN: CPT

## 2019-04-11 PROCEDURE — 84480 ASSAY TRIIODOTHYRONINE (T3): CPT

## 2019-04-11 PROCEDURE — 82728 ASSAY OF FERRITIN: CPT

## 2019-04-11 PROCEDURE — 36415 COLL VENOUS BLD VENIPUNCTURE: CPT

## 2019-04-11 PROCEDURE — 86141 C-REACTIVE PROTEIN HS: CPT

## 2019-04-11 PROCEDURE — 84439 ASSAY OF FREE THYROXINE: CPT

## 2019-04-11 PROCEDURE — 84681 ASSAY OF C-PEPTIDE: CPT

## 2019-04-11 PROCEDURE — 83540 ASSAY OF IRON: CPT

## 2019-04-11 PROCEDURE — 82150 ASSAY OF AMYLASE: CPT

## 2019-04-11 PROCEDURE — 80053 COMPREHEN METABOLIC PANEL: CPT

## 2019-04-11 PROCEDURE — 82746 ASSAY OF FOLIC ACID SERUM: CPT

## 2019-04-11 PROCEDURE — 84443 ASSAY THYROID STIM HORMONE: CPT

## 2019-04-12 LAB
C PEPTIDE SERPL-MCNC: 2.5 NG/ML (ref 1.1–4.4)
T3RU NFR SERPL: 31 % (ref 24–39)

## 2019-04-15 LAB — LEPTIN SERPL-MCNC: 34.3 NG/ML

## 2019-04-21 ENCOUNTER — HOSPITAL ENCOUNTER (EMERGENCY)
Facility: HOSPITAL | Age: 69
Discharge: HOME | End: 2019-04-21
Attending: EMERGENCY MEDICINE | Admitting: EMERGENCY MEDICINE
Payer: MEDICARE

## 2019-04-21 ENCOUNTER — APPOINTMENT (EMERGENCY)
Dept: RADIOLOGY | Facility: HOSPITAL | Age: 69
End: 2019-04-21
Payer: MEDICARE

## 2019-04-21 VITALS
OXYGEN SATURATION: 96 % | RESPIRATION RATE: 21 BRPM | HEIGHT: 62 IN | BODY MASS INDEX: 28.52 KG/M2 | WEIGHT: 155 LBS | HEART RATE: 92 BPM | SYSTOLIC BLOOD PRESSURE: 163 MMHG | TEMPERATURE: 97.9 F | DIASTOLIC BLOOD PRESSURE: 75 MMHG

## 2019-04-21 DIAGNOSIS — S62.609A FINGER FRACTURE, RIGHT: Primary | ICD-10-CM

## 2019-04-21 PROCEDURE — 73130 X-RAY EXAM OF HAND: CPT

## 2019-04-21 PROCEDURE — 99283 EMERGENCY DEPT VISIT LOW MDM: CPT

## 2019-04-21 RX ORDER — HYDROCODONE BITARTRATE AND ACETAMINOPHEN 5; 325 MG/1; MG/1
1 TABLET ORAL EVERY 6 HOURS PRN
Qty: 8 TABLET | Refills: 0 | Status: SHIPPED | OUTPATIENT
Start: 2019-04-21 | End: 2019-05-01

## 2019-04-21 RX ORDER — HYDROCODONE BITARTRATE AND ACETAMINOPHEN 5; 325 MG/1; MG/1
1 TABLET ORAL ONCE
Status: COMPLETED | OUTPATIENT
Start: 2019-04-21 | End: 2019-04-21

## 2019-04-21 RX ORDER — DOXYCYCLINE 100 MG/1
100 TABLET ORAL 2 TIMES DAILY
COMMUNITY
End: 2020-07-28 | Stop reason: ALTCHOICE

## 2019-04-21 RX ADMIN — HYDROCODONE BITARTRATE AND ACETAMINOPHEN 1 TABLET: 5; 325 TABLET ORAL at 14:12

## 2019-09-03 ENCOUNTER — APPOINTMENT (OUTPATIENT)
Dept: LAB | Facility: HOSPITAL | Age: 69
End: 2019-09-03
Payer: MEDICARE

## 2019-09-03 ENCOUNTER — TRANSCRIBE ORDERS (OUTPATIENT)
Dept: LAB | Facility: HOSPITAL | Age: 69
End: 2019-09-03

## 2019-09-03 DIAGNOSIS — Z13.1 SCREENING FOR DIABETES MELLITUS: ICD-10-CM

## 2019-09-03 DIAGNOSIS — R79.82 ELEVATED C-REACTIVE PROTEIN (CRP): ICD-10-CM

## 2019-09-03 DIAGNOSIS — Z13.228 SCREENING FOR METABOLIC DISORDER: ICD-10-CM

## 2019-09-03 DIAGNOSIS — E66.3 OVER WEIGHT: ICD-10-CM

## 2019-09-03 DIAGNOSIS — D50.9 IRON DEFICIENCY ANEMIA, UNSPECIFIED IRON DEFICIENCY ANEMIA TYPE: ICD-10-CM

## 2019-09-03 DIAGNOSIS — E88.9 METABOLIC DISORDER: ICD-10-CM

## 2019-09-03 DIAGNOSIS — R63.5 ABNORMAL WEIGHT GAIN: ICD-10-CM

## 2019-09-03 DIAGNOSIS — E78.5 HYPERLIPIDEMIA, UNSPECIFIED HYPERLIPIDEMIA TYPE: ICD-10-CM

## 2019-09-03 DIAGNOSIS — R53.83 OTHER FATIGUE: ICD-10-CM

## 2019-09-03 DIAGNOSIS — R94.7 ABNORMAL RESULTS OF OTHER ENDOCRINE FUNCTION STUDIES: ICD-10-CM

## 2019-09-03 DIAGNOSIS — Z13.228 SCREENING FOR PHENYLKETONURIA (PKU): ICD-10-CM

## 2019-09-03 DIAGNOSIS — R73.09 OTHER ABNORMAL GLUCOSE: ICD-10-CM

## 2019-09-03 DIAGNOSIS — R53.81 MALAISE: ICD-10-CM

## 2019-09-03 DIAGNOSIS — Z79.01 LONG TERM (CURRENT) USE OF ANTICOAGULANTS: ICD-10-CM

## 2019-09-03 DIAGNOSIS — E66.9 OBESITY, UNSPECIFIED CLASSIFICATION, UNSPECIFIED OBESITY TYPE, UNSPECIFIED WHETHER SERIOUS COMORBIDITY PRESENT: ICD-10-CM

## 2019-09-03 DIAGNOSIS — E11.9 TYPE 2 DIABETES MELLITUS WITHOUT COMPLICATION, WITHOUT LONG-TERM CURRENT USE OF INSULIN (HCC): ICD-10-CM

## 2019-09-03 DIAGNOSIS — R73.01 IMPAIRED FASTING GLUCOSE: ICD-10-CM

## 2019-09-03 DIAGNOSIS — R53.81 MALAISE: Primary | ICD-10-CM

## 2019-09-03 DIAGNOSIS — E55.9 VITAMIN D DEFICIENCY: ICD-10-CM

## 2019-09-03 DIAGNOSIS — R74.9 ABNORMAL SERUM ENZYME LEVEL: ICD-10-CM

## 2019-09-03 DIAGNOSIS — E78.49 OTHER HYPERLIPIDEMIA: ICD-10-CM

## 2019-09-03 DIAGNOSIS — Z13.21 SCREENING FOR MALNUTRITION: ICD-10-CM

## 2019-09-03 DIAGNOSIS — R94.6 ABNORMAL RESULTS OF THYROID FUNCTION STUDIES: ICD-10-CM

## 2019-09-03 LAB
25(OH)D3 SERPL-MCNC: 36.6 NG/ML (ref 30–100)
ALBUMIN SERPL BCP-MCNC: 4.4 G/DL (ref 3.5–5.7)
ALP SERPL-CCNC: 52 U/L (ref 55–165)
ALT SERPL W P-5'-P-CCNC: 24 U/L (ref 7–52)
AMYLASE SERPL-CCNC: 35 IU/L (ref 29–103)
ANION GAP SERPL CALCULATED.3IONS-SCNC: 9 MMOL/L (ref 4–13)
AST SERPL W P-5'-P-CCNC: 19 U/L (ref 13–39)
BASOPHILS # BLD AUTO: 0 THOUSANDS/ΜL (ref 0–0.1)
BASOPHILS NFR BLD AUTO: 0 % (ref 0–2)
BILIRUB SERPL-MCNC: 0.4 MG/DL (ref 0.2–1)
BNP SERPL-MCNC: 26 PG/ML (ref 1–100)
BUN SERPL-MCNC: 14 MG/DL (ref 7–25)
CALCIUM SERPL-MCNC: 10.1 MG/DL (ref 8.6–10.5)
CHLORIDE SERPL-SCNC: 105 MMOL/L (ref 98–107)
CHOLEST SERPL-MCNC: 128 MG/DL (ref 0–200)
CO2 SERPL-SCNC: 27 MMOL/L (ref 21–31)
CREAT SERPL-MCNC: 0.84 MG/DL (ref 0.6–1.2)
CRP SERPL HS-MCNC: 11.8 MG/L
DEPRECATED D DIMER PPP: 421 NG/ML (FEU)
EOSINOPHIL # BLD AUTO: 0.1 THOUSAND/ΜL (ref 0–0.61)
EOSINOPHIL NFR BLD AUTO: 2 % (ref 0–5)
ERYTHROCYTE [DISTWIDTH] IN BLOOD BY AUTOMATED COUNT: 13.9 % (ref 11.5–14.5)
ERYTHROCYTE [SEDIMENTATION RATE] IN BLOOD: 6 MM/HOUR (ref 0–30)
EST. AVERAGE GLUCOSE BLD GHB EST-MCNC: 103 MG/DL
FERRITIN SERPL-MCNC: 27 NG/ML (ref 8–388)
FOLATE SERPL-MCNC: >20 NG/ML (ref 3.1–17.5)
GFR SERPL CREATININE-BSD FRML MDRD: 73 ML/MIN/1.73SQ M
GLUCOSE P FAST SERPL-MCNC: 84 MG/DL (ref 65–99)
HBA1C MFR BLD: 5.2 % (ref 4.2–6.3)
HCT VFR BLD AUTO: 46.1 % (ref 42–47)
HCYS SERPL-SCNC: 8.3 UMOL/L (ref 3.7–11.2)
HDLC SERPL-MCNC: 50 MG/DL (ref 40–60)
HGB BLD-MCNC: 15.1 G/DL (ref 12–16)
INSULIN SERPL-ACNC: 10.4 MU/L (ref 3–25)
IRON SERPL-MCNC: 53 UG/DL (ref 50–170)
LDLC SERPL CALC-MCNC: 57 MG/DL (ref 0–100)
LDLC SERPL DIRECT ASSAY-MCNC: 64.8 MG/DL (ref 0–100)
LYMPHOCYTES # BLD AUTO: 1.7 THOUSANDS/ΜL (ref 0.6–4.47)
LYMPHOCYTES NFR BLD AUTO: 28 % (ref 21–51)
MCH RBC QN AUTO: 29.4 PG (ref 26–34)
MCHC RBC AUTO-ENTMCNC: 32.8 G/DL (ref 31–37)
MCV RBC AUTO: 90 FL (ref 81–99)
MONOCYTES # BLD AUTO: 0.4 THOUSAND/ΜL (ref 0.17–1.22)
MONOCYTES NFR BLD AUTO: 7 % (ref 2–12)
NEUTROPHILS # BLD AUTO: 3.8 THOUSANDS/ΜL (ref 1.4–6.5)
NEUTS SEG NFR BLD AUTO: 63 % (ref 42–75)
NONHDLC SERPL-MCNC: 78 MG/DL
PLATELET # BLD AUTO: 213 THOUSANDS/UL (ref 149–390)
PMV BLD AUTO: 9.4 FL (ref 8.6–11.7)
POTASSIUM SERPL-SCNC: 3.8 MMOL/L (ref 3.5–5.5)
PROT SERPL-MCNC: 6.8 G/DL (ref 6.4–8.9)
RBC # BLD AUTO: 5.14 MILLION/UL (ref 3.9–5.2)
SODIUM SERPL-SCNC: 141 MMOL/L (ref 134–143)
T3 SERPL-MCNC: 1 NG/ML (ref 0.6–1.8)
T3FREE SERPL-MCNC: 2.82 PG/ML (ref 2.3–4.2)
T4 FREE SERPL-MCNC: 1.12 NG/DL (ref 0.76–1.46)
T4 SERPL-MCNC: 8.7 UG/DL (ref 4.7–13.3)
TRIGL SERPL-MCNC: 103 MG/DL (ref 44–166)
TSH SERPL DL<=0.05 MIU/L-ACNC: 1.12 UIU/ML (ref 0.45–5.33)
URATE SERPL-MCNC: 4.1 MG/DL (ref 2.3–7.6)
VIT B12 SERPL-MCNC: 1707 PG/ML (ref 100–900)
WBC # BLD AUTO: 6.1 THOUSAND/UL (ref 4.8–10.8)

## 2019-09-03 PROCEDURE — 83540 ASSAY OF IRON: CPT

## 2019-09-03 PROCEDURE — 36415 COLL VENOUS BLD VENIPUNCTURE: CPT

## 2019-09-03 PROCEDURE — 84443 ASSAY THYROID STIM HORMONE: CPT

## 2019-09-03 PROCEDURE — 82306 VITAMIN D 25 HYDROXY: CPT

## 2019-09-03 PROCEDURE — 83880 ASSAY OF NATRIURETIC PEPTIDE: CPT

## 2019-09-03 PROCEDURE — 85652 RBC SED RATE AUTOMATED: CPT

## 2019-09-03 PROCEDURE — 84481 FREE ASSAY (FT-3): CPT

## 2019-09-03 PROCEDURE — 85025 COMPLETE CBC W/AUTO DIFF WBC: CPT

## 2019-09-03 PROCEDURE — 85379 FIBRIN DEGRADATION QUANT: CPT

## 2019-09-03 PROCEDURE — 83721 ASSAY OF BLOOD LIPOPROTEIN: CPT

## 2019-09-03 PROCEDURE — 84439 ASSAY OF FREE THYROXINE: CPT

## 2019-09-03 PROCEDURE — 82150 ASSAY OF AMYLASE: CPT

## 2019-09-03 PROCEDURE — 84550 ASSAY OF BLOOD/URIC ACID: CPT

## 2019-09-03 PROCEDURE — 83525 ASSAY OF INSULIN: CPT

## 2019-09-03 PROCEDURE — 82728 ASSAY OF FERRITIN: CPT

## 2019-09-03 PROCEDURE — 84480 ASSAY TRIIODOTHYRONINE (T3): CPT

## 2019-09-03 PROCEDURE — 82746 ASSAY OF FOLIC ACID SERUM: CPT

## 2019-09-03 PROCEDURE — 80053 COMPREHEN METABOLIC PANEL: CPT

## 2019-09-03 PROCEDURE — 83036 HEMOGLOBIN GLYCOSYLATED A1C: CPT

## 2019-09-03 PROCEDURE — 83520 IMMUNOASSAY QUANT NOS NONAB: CPT

## 2019-09-03 PROCEDURE — 86141 C-REACTIVE PROTEIN HS: CPT

## 2019-09-03 PROCEDURE — 80061 LIPID PANEL: CPT

## 2019-09-03 PROCEDURE — 83090 ASSAY OF HOMOCYSTEINE: CPT

## 2019-09-03 PROCEDURE — 84681 ASSAY OF C-PEPTIDE: CPT

## 2019-09-03 PROCEDURE — 84479 ASSAY OF THYROID (T3 OR T4): CPT

## 2019-09-03 PROCEDURE — 82607 VITAMIN B-12: CPT

## 2019-09-04 LAB
C PEPTIDE SERPL-MCNC: 2.8 NG/ML (ref 1.1–4.4)
T3RU NFR SERPL: 30 % (ref 24–39)

## 2019-09-05 LAB — LEPTIN SERPL-MCNC: 14.8 NG/ML

## 2019-11-01 ENCOUNTER — ANNUAL EXAM (OUTPATIENT)
Dept: OBGYN CLINIC | Facility: CLINIC | Age: 69
End: 2019-11-01
Payer: MEDICARE

## 2019-11-01 VITALS — BODY MASS INDEX: 27.14 KG/M2 | SYSTOLIC BLOOD PRESSURE: 142 MMHG | WEIGHT: 148.4 LBS | DIASTOLIC BLOOD PRESSURE: 90 MMHG

## 2019-11-01 DIAGNOSIS — Z00.00 PHYSICAL EXAM, ANNUAL: Primary | ICD-10-CM

## 2019-11-01 DIAGNOSIS — F41.9 ANXIETY: ICD-10-CM

## 2019-11-01 DIAGNOSIS — Z12.31 ENCOUNTER FOR SCREENING MAMMOGRAM FOR MALIGNANT NEOPLASM OF BREAST: ICD-10-CM

## 2019-11-01 PROCEDURE — G0101 CA SCREEN;PELVIC/BREAST EXAM: HCPCS | Performed by: OBSTETRICS & GYNECOLOGY

## 2019-11-01 RX ORDER — LORAZEPAM 1 MG/1
1 TABLET ORAL
Qty: 15 TABLET | Refills: 0 | Status: SHIPPED | OUTPATIENT
Start: 2019-11-01 | End: 2020-07-28 | Stop reason: SDUPTHER

## 2019-11-01 NOTE — PROGRESS NOTES
ASSESSMENT & PLAN: Rachele Wilkes is a 79 y o   with normal gynecologic exam     1   Routine well woman exam done today  2  Pap and HPV:  The patient's last pap and hpv was years ago   It was normal     Pap with cotesting was not done today  Current ASCCP Guidelines reviewed  - has hysterectomy   3  Mammogram ordered  4  Colorectal cancer screening was notordered  - up to date  11  The following were reviewed in today's visit: breast self exam, mammography screening ordered, menopause, exercise and healthy diet  6  Anxiety : lorazepam sent aware needs follow up with PCP   CC:  Annual Gynecologic Examination    HPI: Rachele Wilkes is a 79 y o  [place] who presents for annual gynecologic examination  She has the following concerns: If possible refill of lorazepam while sees PCP      Health Maintenance:    She wears her seatbelt routinely  She does perform regular monthly self breast exams  She feels safe at home  Past Medical History:   Diagnosis Date    Asthma     Disease of thyroid gland     Migraine        Past Surgical History:   Procedure Laterality Date    FINGER FRACTURE SURGERY      LEG SURGERY      TOTAL ABDOMINAL HYSTERECTOMY W/ BILATERAL SALPINGOOPHORECTOMY         Past OB/Gyn History:  OB History        0    Para   0    Term   0       0    AB   0    Living   0       SAB   0    TAB   0    Ectopic   0    Multiple   0    Live Births   0               History reviewed  No pertinent family history      Social History:  Social History     Socioeconomic History    Marital status: /Civil Union     Spouse name: Not on file    Number of children: Not on file    Years of education: Not on file    Highest education level: Not on file   Occupational History    Not on file   Social Needs    Financial resource strain: Not on file    Food insecurity:     Worry: Not on file     Inability: Not on file    Transportation needs:     Medical: Not on file     Non-medical: Not on file   Tobacco Use    Smoking status: Never Smoker    Smokeless tobacco: Never Used   Substance and Sexual Activity    Alcohol use: Yes     Comment: social    Drug use: No    Sexual activity: Yes     Partners: Male   Lifestyle    Physical activity:     Days per week: Not on file     Minutes per session: Not on file    Stress: Not on file   Relationships    Social connections:     Talks on phone: Not on file     Gets together: Not on file     Attends [employer] service: Not on file     Active member of club or organization: Not on file     Attends meetings of clubs or organizations: Not on file     Relationship status: Not on file    Intimate partner violence:     Fear of current or ex partner: Not on file     Emotionally abused: Not on file     Physically abused: Not on file     Forced sexual activity: Not on file   Other Topics Concern    Not on file   Social History Narrative    Not on file         No Known Allergies      Current Outpatient Medications:     aspirin (ECOTRIN) 325 mg EC tablet, Take by mouth, Disp: , Rfl:     butalbital-acetaminophen-caffeine (FIORICET,ESGIC) -40 mg per tablet, prn for HA, Disp: , Rfl:     Calcium 200 MG TABS, Take by mouth, Disp: , Rfl:     Cholecalciferol (VITAMIN D PO), Take by mouth, Disp: , Rfl:     Coenzyme Q10 (COQ10) 100 MG CAPS, Take by mouth, Disp: , Rfl:     ezetimibe-simvastatin (VYTORIN) 10-40 mg per tablet, Take by mouth, Disp: , Rfl:     fenofibrate micronized (ANTARA) 130 MG capsule, Take by mouth, Disp: , Rfl:     ibandronate (BONIVA) 150 MG tablet, , Disp: , Rfl: 3    levothyroxine (SYNTHROID) 137 mcg tablet, Take by mouth, Disp: , Rfl:     LORazepam (ATIVAN) 1 mg tablet, Take 1 tablet (1 mg total) by mouth daily at bedtime, Disp: 15 tablet, Rfl: 0    Magnesium 100 MG TABS, Take by mouth, Disp: , Rfl:     Multiple Vitamin (MULTI-VITAMIN DAILY PO), once daily, Disp: , Rfl:     naproxen (NAPROSYN) 500 mg tablet, , Disp: , Rfl:     nicotine polacrilex (NICORETTE) 2 mg gum, 2mg per piece 6-8 pieces daily, Disp: , Rfl:     Omega-3 1000 MG CAPS, Take by mouth, Disp: , Rfl:     phentermine (ADIPEX-P) 37 5 MG tablet, , Disp: , Rfl: 3    Potassium Gluconate 80 MG TABS, Take by mouth, Disp: , Rfl:     SUMAtriptan (IMITREX) 100 mg tablet, Take 100 mg by mouth, Disp: , Rfl:     alendronate (FOSAMAX) 70 mg tablet, Take 70 mg by mouth Once a week, Disp: , Rfl:     cyclobenzaprine (FLEXERIL) 5 mg tablet, , Disp: , Rfl:     doxycycline (ADOXA) 100 MG tablet, Take 100 mg by mouth 2 (two) times a day, Disp: , Rfl:     fluticasone (FLONASE) 50 mcg/act nasal spray, , Disp: , Rfl:     montelukast (SINGULAIR) 10 mg tablet, , Disp: , Rfl:     Review of Systems  Constitutional :no fever, feels well, no tiredness, no recent weight gain or loss  ENT: no ear ache, no loss of hearing, no nosebleeds or nasal discharge, no sore throat or hoarseness  Cardiovascular: no complaints of slow or fast heart beat, no chest pain, no palpitations, no leg claudication or lower extremity edema  Respiratory: no complaints of shortness of shortness of breath, no HUTCHISON  Breasts:no complaints of breast pain, breast lump, or nipple discharge  Gastrointestinal: no complaints of abdominal pain, constipation, nausea, vomiting, or diarrhea or bloody stools  Genitourinary : no complaints of dysuria, incontinence, pelvic pain, no dysmenorrhea, vaginal discharge or abnormal vaginal bleeding and as noted in HPI  Musculoskeletal: no complaints of arthralgia, no myalgia, no joint swelling or stiffness, no limb pain or swelling    Integumentary: no complaints of skin rash or lesion, itching or dry skin  Neurological: no complaints of headache, no confusion, no numbness or tingling, no dizziness or fainting    Objective      /90   Wt 67 3 kg (148 lb 6 4 oz)   BMI 27 14 kg/m²   General:   appears stated age, cooperative, alert normal mood and affect Breasts: normal appearance, no masses or tenderness   Abdomen: soft, non-tender, without masses or organomegaly   Vulva: normal   Vagina: normal vagina, no discharge, exudate, lesion, or erythema   Urethra: normal   Cervix: Absent  Uterus: uterus absent   Adnexa: surgically absent   Lymphatic palpation of lymph nodes in neck, axilla, groin and/or other locations: no lymphadenopathy or masses noted   Skin normal skin turgor and no rashes     Psychiatric orientation to person, place, and time: normal  mood and affect: normal

## 2019-11-11 ENCOUNTER — HOSPITAL ENCOUNTER (OUTPATIENT)
Dept: MAMMOGRAPHY | Facility: HOSPITAL | Age: 69
Discharge: HOME | End: 2019-11-11
Payer: MEDICARE

## 2019-11-11 VITALS — BODY MASS INDEX: 27.56 KG/M2 | HEIGHT: 61 IN | WEIGHT: 146 LBS

## 2019-11-11 DIAGNOSIS — Z12.31 ENCOUNTER FOR SCREENING MAMMOGRAM FOR MALIGNANT NEOPLASM OF BREAST: ICD-10-CM

## 2019-11-11 PROCEDURE — 77063 BREAST TOMOSYNTHESIS BI: CPT

## 2019-11-11 PROCEDURE — 77067 SCR MAMMO BI INCL CAD: CPT

## 2019-11-12 ENCOUNTER — TRANSCRIBE ORDERS (OUTPATIENT)
Dept: LAB | Facility: HOSPITAL | Age: 69
End: 2019-11-12

## 2019-11-12 ENCOUNTER — APPOINTMENT (OUTPATIENT)
Dept: LAB | Facility: HOSPITAL | Age: 69
End: 2019-11-12
Payer: MEDICARE

## 2019-11-12 DIAGNOSIS — R73.09 ABNORMAL GLUCOSE: Primary | ICD-10-CM

## 2019-11-12 DIAGNOSIS — E34.9 ENDOCRINE DISORDER: ICD-10-CM

## 2019-11-12 DIAGNOSIS — E78.00 PURE HYPERCHOLESTEROLEMIA: ICD-10-CM

## 2019-11-12 DIAGNOSIS — N95.1 SYMPTOMATIC MENOPAUSAL OR FEMALE CLIMACTERIC STATES: ICD-10-CM

## 2019-11-12 DIAGNOSIS — Z79.890 NEED FOR PROPHYLACTIC HORMONE REPLACEMENT THERAPY (POSTMENOPAUSAL): ICD-10-CM

## 2019-11-12 DIAGNOSIS — E03.9 HYPOTHYROIDISM, UNSPECIFIED TYPE: ICD-10-CM

## 2019-11-12 DIAGNOSIS — R73.09 ABNORMAL GLUCOSE: ICD-10-CM

## 2019-11-12 DIAGNOSIS — R94.6 ABNORMAL RESULTS OF THYROID FUNCTION STUDIES: ICD-10-CM

## 2019-11-12 DIAGNOSIS — E03.9 MYXEDEMA HEART DISEASE: ICD-10-CM

## 2019-11-12 DIAGNOSIS — E55.9 VITAMIN D DEFICIENCY: ICD-10-CM

## 2019-11-12 DIAGNOSIS — E78.5 HYPERLIPIDEMIA, UNSPECIFIED HYPERLIPIDEMIA TYPE: ICD-10-CM

## 2019-11-12 DIAGNOSIS — I51.9 MYXEDEMA HEART DISEASE: ICD-10-CM

## 2019-11-12 DIAGNOSIS — G43.011 INTRACTABLE MIGRAINE WITHOUT AURA AND WITH STATUS MIGRAINOSUS: ICD-10-CM

## 2019-11-12 DIAGNOSIS — M81.0 SENILE OSTEOPOROSIS: ICD-10-CM

## 2019-11-12 LAB
25(OH)D3 SERPL-MCNC: 38.6 NG/ML (ref 30–100)
ALBUMIN SERPL BCP-MCNC: 4.5 G/DL (ref 3.5–5.7)
ALP SERPL-CCNC: 68 U/L (ref 55–165)
ALT SERPL W P-5'-P-CCNC: 27 U/L (ref 7–52)
AMYLASE SERPL-CCNC: 61 IU/L (ref 29–103)
ANION GAP SERPL CALCULATED.3IONS-SCNC: 8 MMOL/L (ref 4–13)
AST SERPL W P-5'-P-CCNC: 21 U/L (ref 13–39)
BASOPHILS # BLD AUTO: 0 THOUSANDS/ΜL (ref 0–0.1)
BASOPHILS NFR BLD AUTO: 1 % (ref 0–2)
BILIRUB SERPL-MCNC: 0.4 MG/DL (ref 0.2–1)
BNP SERPL-MCNC: 53 PG/ML (ref 1–100)
BUN SERPL-MCNC: 14 MG/DL (ref 7–25)
CALCIUM ALBUM COR SERPL-MCNC: 10 MG/DL (ref 8.3–10.1)
CALCIUM SERPL-MCNC: 10.4 MG/DL (ref 8.6–10.5)
CHLORIDE SERPL-SCNC: 104 MMOL/L (ref 98–107)
CHOLEST SERPL-MCNC: 154 MG/DL (ref 0–200)
CO2 SERPL-SCNC: 28 MMOL/L (ref 21–31)
CREAT SERPL-MCNC: 0.65 MG/DL (ref 0.6–1.2)
CRP SERPL HS-MCNC: 10.55 MG/L
D DIMER PPP FEU-MCNC: 0.69 UG/ML FEU
EOSINOPHIL # BLD AUTO: 0.1 THOUSAND/ΜL (ref 0–0.61)
EOSINOPHIL NFR BLD AUTO: 2 % (ref 0–5)
ERYTHROCYTE [DISTWIDTH] IN BLOOD BY AUTOMATED COUNT: 14.1 % (ref 11.5–14.5)
EST. AVERAGE GLUCOSE BLD GHB EST-MCNC: 100 MG/DL
ESTRADIOL SERPL-MCNC: 33 PG/ML
FERRITIN SERPL-MCNC: 20 NG/ML (ref 8–388)
FOLATE SERPL-MCNC: >20 NG/ML (ref 3.1–17.5)
FSH SERPL-ACNC: 40.8 MIU/ML
GFR SERPL CREATININE-BSD FRML MDRD: 93 ML/MIN/1.73SQ M
GLUCOSE P FAST SERPL-MCNC: 81 MG/DL (ref 65–99)
HBA1C MFR BLD: 5.1 % (ref 4.2–6.3)
HCT VFR BLD AUTO: 45.4 % (ref 42–47)
HCYS SERPL-SCNC: 6.6 UMOL/L (ref 3.7–11.2)
HDLC SERPL-MCNC: 58 MG/DL
HGB BLD-MCNC: 15.1 G/DL (ref 12–16)
INSULIN SERPL-ACNC: 8.8 MU/L (ref 3–25)
IRON SERPL-MCNC: 154 UG/DL (ref 50–170)
LDLC SERPL CALC-MCNC: 72 MG/DL (ref 0–100)
LDLC SERPL DIRECT ASSAY-MCNC: 80.2 MG/DL (ref 0–100)
LYMPHOCYTES # BLD AUTO: 1.3 THOUSANDS/ΜL (ref 0.6–4.47)
LYMPHOCYTES NFR BLD AUTO: 21 % (ref 21–51)
MCH RBC QN AUTO: 29.9 PG (ref 26–34)
MCHC RBC AUTO-ENTMCNC: 33.2 G/DL (ref 31–37)
MCV RBC AUTO: 90 FL (ref 81–99)
MONOCYTES # BLD AUTO: 0.3 THOUSAND/ΜL (ref 0.17–1.22)
MONOCYTES NFR BLD AUTO: 6 % (ref 2–12)
NEUTROPHILS # BLD AUTO: 4.3 THOUSANDS/ΜL (ref 1.4–6.5)
NEUTS SEG NFR BLD AUTO: 71 % (ref 42–75)
NONHDLC SERPL-MCNC: 96 MG/DL
PLATELET # BLD AUTO: 278 THOUSANDS/UL (ref 149–390)
PMV BLD AUTO: 8.7 FL (ref 8.6–11.7)
POTASSIUM SERPL-SCNC: 4 MMOL/L (ref 3.5–5.5)
PROGEST SERPL-MCNC: 1.6 NG/ML
PROT SERPL-MCNC: 7.2 G/DL (ref 6.4–8.9)
RBC # BLD AUTO: 5.03 MILLION/UL (ref 3.9–5.2)
SODIUM SERPL-SCNC: 140 MMOL/L (ref 134–143)
T3 SERPL-MCNC: 1 NG/ML (ref 0.6–1.8)
T3FREE SERPL-MCNC: 2.13 PG/ML (ref 2.3–4.2)
T4 FREE SERPL-MCNC: 0.92 NG/DL (ref 0.76–1.46)
T4 SERPL-MCNC: 8.1 UG/DL (ref 4.7–13.3)
TRIGL SERPL-MCNC: 119 MG/DL (ref 44–166)
TSH SERPL DL<=0.05 MIU/L-ACNC: 0.67 UIU/ML (ref 0.45–5.33)
URATE SERPL-MCNC: 4.5 MG/DL (ref 2.3–7.6)
VIT B12 SERPL-MCNC: 1671 PG/ML (ref 100–900)
WBC # BLD AUTO: 6.1 THOUSAND/UL (ref 4.8–10.8)

## 2019-11-12 PROCEDURE — 84681 ASSAY OF C-PEPTIDE: CPT

## 2019-11-12 PROCEDURE — 84550 ASSAY OF BLOOD/URIC ACID: CPT

## 2019-11-12 PROCEDURE — 85379 FIBRIN DEGRADATION QUANT: CPT

## 2019-11-12 PROCEDURE — 86141 C-REACTIVE PROTEIN HS: CPT

## 2019-11-12 PROCEDURE — 36415 COLL VENOUS BLD VENIPUNCTURE: CPT

## 2019-11-12 PROCEDURE — 84481 FREE ASSAY (FT-3): CPT

## 2019-11-12 PROCEDURE — 86376 MICROSOMAL ANTIBODY EACH: CPT

## 2019-11-12 PROCEDURE — 83036 HEMOGLOBIN GLYCOSYLATED A1C: CPT

## 2019-11-12 PROCEDURE — 83001 ASSAY OF GONADOTROPIN (FSH): CPT

## 2019-11-12 PROCEDURE — 82150 ASSAY OF AMYLASE: CPT

## 2019-11-12 PROCEDURE — 84439 ASSAY OF FREE THYROXINE: CPT

## 2019-11-12 PROCEDURE — 83525 ASSAY OF INSULIN: CPT

## 2019-11-12 PROCEDURE — 83520 IMMUNOASSAY QUANT NOS NONAB: CPT

## 2019-11-12 PROCEDURE — 80061 LIPID PANEL: CPT

## 2019-11-12 PROCEDURE — 86800 THYROGLOBULIN ANTIBODY: CPT

## 2019-11-12 PROCEDURE — 84482 T3 REVERSE: CPT

## 2019-11-12 PROCEDURE — 84144 ASSAY OF PROGESTERONE: CPT

## 2019-11-12 PROCEDURE — 82728 ASSAY OF FERRITIN: CPT

## 2019-11-12 PROCEDURE — 83721 ASSAY OF BLOOD LIPOPROTEIN: CPT

## 2019-11-12 PROCEDURE — 82679 ASSAY OF ESTRONE: CPT

## 2019-11-12 PROCEDURE — 82607 VITAMIN B-12: CPT

## 2019-11-12 PROCEDURE — 82306 VITAMIN D 25 HYDROXY: CPT

## 2019-11-12 PROCEDURE — 84479 ASSAY OF THYROID (T3 OR T4): CPT

## 2019-11-12 PROCEDURE — 82746 ASSAY OF FOLIC ACID SERUM: CPT

## 2019-11-12 PROCEDURE — 85025 COMPLETE CBC W/AUTO DIFF WBC: CPT

## 2019-11-12 PROCEDURE — 83090 ASSAY OF HOMOCYSTEINE: CPT

## 2019-11-12 PROCEDURE — 84443 ASSAY THYROID STIM HORMONE: CPT

## 2019-11-12 PROCEDURE — 84480 ASSAY TRIIODOTHYRONINE (T3): CPT

## 2019-11-12 PROCEDURE — 80053 COMPREHEN METABOLIC PANEL: CPT

## 2019-11-12 PROCEDURE — 83880 ASSAY OF NATRIURETIC PEPTIDE: CPT

## 2019-11-12 PROCEDURE — 83540 ASSAY OF IRON: CPT

## 2019-11-12 PROCEDURE — 82670 ASSAY OF TOTAL ESTRADIOL: CPT

## 2019-11-13 LAB
C PEPTIDE SERPL-MCNC: 2.3 NG/ML (ref 1.1–4.4)
T3RU NFR SERPL: 30 % (ref 24–39)
THYROGLOB AB SERPL-ACNC: <1 IU/ML (ref 0–0.9)
THYROPEROXIDASE AB SERPL-ACNC: 8 IU/ML (ref 0–34)

## 2019-11-14 LAB
ESTRONE SERPL-MCNC: 57 PG/ML
LEPTIN SERPL-MCNC: 16.8 NG/ML

## 2019-11-17 LAB — T3REVERSE SERPL-MCNC: 14.1 NG/DL (ref 9.2–24.1)

## 2020-03-17 ENCOUNTER — TELEPHONE (OUTPATIENT)
Dept: OBGYN CLINIC | Facility: MEDICAL CENTER | Age: 70
End: 2020-03-17

## 2020-03-17 NOTE — TELEPHONE ENCOUNTER
Pt has medicare and is concerned because she got a bill for a pelvic exam  Pt had a hysterectomy many years ago but unsure if due to cancer, No documentation in her history about cancer and has been with her  for over 24 years  I explained to her that medicare only covers a yearly every 2 years   She wanted a billing code so I gave her the Marky 39 code

## 2020-06-30 ENCOUNTER — OFFICE VISIT (OUTPATIENT)
Dept: FAMILY MEDICINE CLINIC | Facility: CLINIC | Age: 70
End: 2020-06-30
Payer: MEDICARE

## 2020-06-30 VITALS
BODY MASS INDEX: 27 KG/M2 | SYSTOLIC BLOOD PRESSURE: 130 MMHG | HEIGHT: 61 IN | TEMPERATURE: 97.4 F | WEIGHT: 143 LBS | DIASTOLIC BLOOD PRESSURE: 94 MMHG

## 2020-06-30 DIAGNOSIS — E66.3 OVERWEIGHT: ICD-10-CM

## 2020-06-30 DIAGNOSIS — E03.9 ACQUIRED HYPOTHYROIDISM: ICD-10-CM

## 2020-06-30 DIAGNOSIS — Z76.89 ENCOUNTER TO ESTABLISH CARE WITH NEW DOCTOR: Primary | ICD-10-CM

## 2020-06-30 DIAGNOSIS — Z13.31 NEGATIVE DEPRESSION SCREENING: ICD-10-CM

## 2020-06-30 DIAGNOSIS — Z11.59 NEED FOR HEPATITIS C SCREENING TEST: ICD-10-CM

## 2020-06-30 DIAGNOSIS — Z13.6 SCREENING FOR CARDIOVASCULAR CONDITION: ICD-10-CM

## 2020-06-30 PROCEDURE — 1160F RVW MEDS BY RX/DR IN RCRD: CPT | Performed by: FAMILY MEDICINE

## 2020-06-30 PROCEDURE — 3080F DIAST BP >= 90 MM HG: CPT | Performed by: FAMILY MEDICINE

## 2020-06-30 PROCEDURE — 3008F BODY MASS INDEX DOCD: CPT | Performed by: FAMILY MEDICINE

## 2020-06-30 PROCEDURE — 1036F TOBACCO NON-USER: CPT | Performed by: FAMILY MEDICINE

## 2020-06-30 PROCEDURE — 3075F SYST BP GE 130 - 139MM HG: CPT | Performed by: FAMILY MEDICINE

## 2020-06-30 PROCEDURE — 99204 OFFICE O/P NEW MOD 45 MIN: CPT | Performed by: FAMILY MEDICINE

## 2020-07-01 ENCOUNTER — TELEPHONE (OUTPATIENT)
Dept: ADMINISTRATIVE | Facility: OTHER | Age: 70
End: 2020-07-01

## 2020-07-01 NOTE — LETTER
Procedure Request Form: Colonoscopy      Date Requested: 20  Patient: Candace Flathead  Patient : 1950   Referring Provider: Conception Tammy, DO        Date of Procedure ______________________________       The above patient has informed us that they have completed their   most recent Colonoscopy at your facility  Please complete   this form and attach all corresponding procedure reports/results  Comments __________________________________________________________  ____________________________________________________________________  ____________________________________________________________________  ____________________________________________________________________    Facility Completing Procedure _________________________________________    Form Completed By (print name) _______________________________________      Signature __________________________________________________________      These reports are needed for  compliance    Please fax this completed form and a copy of the procedure report to our office located at [place] [address] as soon as possible to [phone] attention Peter Roblero: Phone [phone]    We thank you for your assistance in treating our mutual patient

## 2020-07-01 NOTE — TELEPHONE ENCOUNTER
Upon review of the In Basket request and the patient's chart, initial outreach has been made via fax, please see Contacts section for details  A second outreach attempt will be made within 5 business days      Thank you  Bong Martínez MA

## 2020-07-01 NOTE — TELEPHONE ENCOUNTER
----- Message from Gina Dubon sent at 2020  2:21 PM EDT -----  [facility]  Contact: [phone]   20 2:21 PM    Hello, our patient Milan Pallas has had CRC: Colonoscopy completed/performed  Please assist in updating the patient chart by making an External outreach to dr Augustina [place] facility located in [facility]  The date of service is [id number]      Thank you,  Sarath Borges MA  [facility]

## 2020-07-15 NOTE — TELEPHONE ENCOUNTER
As a follow-up, a second attempt has been made for outreach via telephone, please see Contacts section for details  A third and final attempt will be made within 5 business days      Thank you  Lucille Drummond MA

## 2020-07-21 ENCOUNTER — APPOINTMENT (OUTPATIENT)
Dept: LAB | Facility: HOSPITAL | Age: 70
End: 2020-07-21
Attending: FAMILY MEDICINE
Payer: MEDICARE

## 2020-07-21 ENCOUNTER — TRANSCRIBE ORDERS (OUTPATIENT)
Dept: ADMINISTRATIVE | Facility: HOSPITAL | Age: 70
End: 2020-07-21

## 2020-07-21 DIAGNOSIS — E78.00 PURE HYPERCHOLESTEROLEMIA: ICD-10-CM

## 2020-07-21 DIAGNOSIS — H02.831 DERMATOCHALASIS OF RIGHT UPPER EYELID: ICD-10-CM

## 2020-07-21 DIAGNOSIS — Z11.59 NEED FOR HEPATITIS C SCREENING TEST: ICD-10-CM

## 2020-07-21 DIAGNOSIS — H02.413 MECHANICAL PTOSIS OF EYELID OF BOTH EYES: ICD-10-CM

## 2020-07-21 DIAGNOSIS — G43.001 MIGRAINE WITHOUT AURA AND WITH STATUS MIGRAINOSUS, NOT INTRACTABLE: ICD-10-CM

## 2020-07-21 DIAGNOSIS — E03.9 MYXEDEMA HEART DISEASE: ICD-10-CM

## 2020-07-21 DIAGNOSIS — H02.413 MECHANICAL PTOSIS OF EYELID OF BOTH EYES: Primary | ICD-10-CM

## 2020-07-21 DIAGNOSIS — H02.834 DERMATOCHALASIS OF LEFT UPPER EYELID: ICD-10-CM

## 2020-07-21 DIAGNOSIS — I51.9 MYXEDEMA HEART DISEASE: ICD-10-CM

## 2020-07-21 DIAGNOSIS — N95.1 SYMPTOMATIC MENOPAUSAL OR FEMALE CLIMACTERIC STATES: Primary | ICD-10-CM

## 2020-07-21 DIAGNOSIS — E03.9 ACQUIRED HYPOTHYROIDISM: ICD-10-CM

## 2020-07-21 DIAGNOSIS — Z79.890 NEED FOR PROPHYLACTIC HORMONE REPLACEMENT THERAPY (POSTMENOPAUSAL): ICD-10-CM

## 2020-07-21 DIAGNOSIS — Z13.6 SCREENING FOR CARDIOVASCULAR CONDITION: ICD-10-CM

## 2020-07-21 DIAGNOSIS — N95.1 SYMPTOMATIC MENOPAUSAL OR FEMALE CLIMACTERIC STATES: ICD-10-CM

## 2020-07-21 LAB
ALBUMIN SERPL BCP-MCNC: 4.5 G/DL (ref 3.5–5.7)
ALP SERPL-CCNC: 60 U/L (ref 55–165)
ALT SERPL W P-5'-P-CCNC: 27 U/L (ref 7–52)
ANION GAP SERPL CALCULATED.3IONS-SCNC: 7 MMOL/L (ref 4–13)
AST SERPL W P-5'-P-CCNC: 30 U/L (ref 13–39)
ATRIAL RATE: 88 BPM
BASOPHILS # BLD AUTO: 0 THOUSANDS/ΜL (ref 0–0.1)
BASOPHILS NFR BLD AUTO: 0 % (ref 0–2)
BILIRUB SERPL-MCNC: 0.6 MG/DL (ref 0.2–1)
BUN SERPL-MCNC: 19 MG/DL (ref 7–25)
CALCIUM ALBUM COR SERPL-MCNC: 10.7 MG/DL (ref 8.3–10.1)
CALCIUM SERPL-MCNC: 11.1 MG/DL (ref 8.6–10.5)
CHLORIDE SERPL-SCNC: 103 MMOL/L (ref 98–107)
CHOLEST SERPL-MCNC: 157 MG/DL (ref 0–200)
CO2 SERPL-SCNC: 30 MMOL/L (ref 21–31)
CREAT SERPL-MCNC: 0.87 MG/DL (ref 0.6–1.2)
EOSINOPHIL # BLD AUTO: 0.3 THOUSAND/ΜL (ref 0–0.61)
EOSINOPHIL NFR BLD AUTO: 3 % (ref 0–5)
ERYTHROCYTE [DISTWIDTH] IN BLOOD BY AUTOMATED COUNT: 14 % (ref 11.5–14.5)
ESTRADIOL SERPL-MCNC: 16 PG/ML
GFR SERPL CREATININE-BSD FRML MDRD: 69 ML/MIN/1.73SQ M
GLUCOSE P FAST SERPL-MCNC: 85 MG/DL (ref 65–99)
HCT VFR BLD AUTO: 47.4 % (ref 42–47)
HCV AB SER QL: NORMAL
HDLC SERPL-MCNC: 60 MG/DL
HGB BLD-MCNC: 16 G/DL (ref 12–16)
LDLC SERPL CALC-MCNC: 78 MG/DL (ref 0–100)
LYMPHOCYTES # BLD AUTO: 1.8 THOUSANDS/ΜL (ref 0.6–4.47)
LYMPHOCYTES NFR BLD AUTO: 20 % (ref 21–51)
MCH RBC QN AUTO: 30.4 PG (ref 26–34)
MCHC RBC AUTO-ENTMCNC: 33.6 G/DL (ref 31–37)
MCV RBC AUTO: 90 FL (ref 81–99)
MONOCYTES # BLD AUTO: 0.6 THOUSAND/ΜL (ref 0.17–1.22)
MONOCYTES NFR BLD AUTO: 7 % (ref 2–12)
NEUTROPHILS # BLD AUTO: 6.2 THOUSANDS/ΜL (ref 1.4–6.5)
NEUTS SEG NFR BLD AUTO: 70 % (ref 42–75)
NONHDLC SERPL-MCNC: 97 MG/DL
P AXIS: 47 DEGREES
PLATELET # BLD AUTO: 286 THOUSANDS/UL (ref 149–390)
PMV BLD AUTO: 9.2 FL (ref 8.6–11.7)
POTASSIUM SERPL-SCNC: 4.4 MMOL/L (ref 3.5–5.5)
PR INTERVAL: 158 MS
PROGEST SERPL-MCNC: 3.7 NG/ML
PROT SERPL-MCNC: 7.4 G/DL (ref 6.4–8.9)
QRS AXIS: -18 DEGREES
QRSD INTERVAL: 90 MS
QT INTERVAL: 356 MS
QTC INTERVAL: 430 MS
RBC # BLD AUTO: 5.25 MILLION/UL (ref 3.9–5.2)
SODIUM SERPL-SCNC: 140 MMOL/L (ref 134–143)
T WAVE AXIS: 44 DEGREES
T3FREE SERPL-MCNC: 3.13 PG/ML (ref 2.3–4.2)
T4 FREE SERPL-MCNC: 1.13 NG/DL (ref 0.76–1.46)
TRIGL SERPL-MCNC: 97 MG/DL (ref 44–166)
TSH SERPL DL<=0.05 MIU/L-ACNC: 1.54 UIU/ML (ref 0.45–5.33)
VENTRICULAR RATE: 88 BPM
WBC # BLD AUTO: 8.9 THOUSAND/UL (ref 4.8–10.8)

## 2020-07-21 PROCEDURE — 93005 ELECTROCARDIOGRAM TRACING: CPT

## 2020-07-21 PROCEDURE — 84443 ASSAY THYROID STIM HORMONE: CPT

## 2020-07-21 PROCEDURE — 80053 COMPREHEN METABOLIC PANEL: CPT

## 2020-07-21 PROCEDURE — 85025 COMPLETE CBC W/AUTO DIFF WBC: CPT

## 2020-07-21 PROCEDURE — 86803 HEPATITIS C AB TEST: CPT

## 2020-07-21 PROCEDURE — 84144 ASSAY OF PROGESTERONE: CPT

## 2020-07-21 PROCEDURE — 82670 ASSAY OF TOTAL ESTRADIOL: CPT

## 2020-07-21 PROCEDURE — 80061 LIPID PANEL: CPT

## 2020-07-21 PROCEDURE — 82679 ASSAY OF ESTRONE: CPT

## 2020-07-21 PROCEDURE — 84439 ASSAY OF FREE THYROXINE: CPT

## 2020-07-21 PROCEDURE — 84481 FREE ASSAY (FT-3): CPT

## 2020-07-21 PROCEDURE — 93010 ELECTROCARDIOGRAM REPORT: CPT | Performed by: INTERNAL MEDICINE

## 2020-07-21 PROCEDURE — 36415 COLL VENOUS BLD VENIPUNCTURE: CPT

## 2020-07-21 NOTE — TELEPHONE ENCOUNTER
Upon review of the In Basket request we were able to locate, review, and update the patient chart as requested for CRC: Colonoscopy  Any additional questions or concerns should be emailed to the Practice Liaisons via [email or web address] email, please do not reply via In Basket      Thank you  Bria Powers MA

## 2020-07-27 LAB — ESTRONE SERPL-MCNC: 51 PG/ML

## 2020-07-28 ENCOUNTER — OFFICE VISIT (OUTPATIENT)
Dept: FAMILY MEDICINE CLINIC | Facility: CLINIC | Age: 70
End: 2020-07-28
Payer: MEDICARE

## 2020-07-28 VITALS
WEIGHT: 141 LBS | BODY MASS INDEX: 26.62 KG/M2 | HEIGHT: 61 IN | SYSTOLIC BLOOD PRESSURE: 144 MMHG | HEART RATE: 101 BPM | OXYGEN SATURATION: 95 % | TEMPERATURE: 99.8 F | DIASTOLIC BLOOD PRESSURE: 90 MMHG

## 2020-07-28 DIAGNOSIS — G43.809 OTHER MIGRAINE WITHOUT STATUS MIGRAINOSUS, NOT INTRACTABLE: ICD-10-CM

## 2020-07-28 DIAGNOSIS — E83.52 HYPERCALCEMIA: ICD-10-CM

## 2020-07-28 DIAGNOSIS — F41.9 ANXIETY: ICD-10-CM

## 2020-07-28 DIAGNOSIS — E03.9 ACQUIRED HYPOTHYROIDISM: ICD-10-CM

## 2020-07-28 DIAGNOSIS — E78.00 HYPERCHOLESTEROLEMIA: ICD-10-CM

## 2020-07-28 DIAGNOSIS — R63.4 WEIGHT LOSS: ICD-10-CM

## 2020-07-28 DIAGNOSIS — E78.1 HYPERTRIGLYCERIDEMIA: ICD-10-CM

## 2020-07-28 DIAGNOSIS — Z00.00 MEDICARE ANNUAL WELLNESS VISIT, INITIAL: Primary | ICD-10-CM

## 2020-07-28 DIAGNOSIS — Z00.00 PHYSICAL EXAM, ANNUAL: ICD-10-CM

## 2020-07-28 PROCEDURE — 1170F FXNL STATUS ASSESSED: CPT | Performed by: FAMILY MEDICINE

## 2020-07-28 PROCEDURE — 3077F SYST BP >= 140 MM HG: CPT | Performed by: FAMILY MEDICINE

## 2020-07-28 PROCEDURE — 1125F AMNT PAIN NOTED PAIN PRSNT: CPT | Performed by: FAMILY MEDICINE

## 2020-07-28 PROCEDURE — 1036F TOBACCO NON-USER: CPT | Performed by: FAMILY MEDICINE

## 2020-07-28 PROCEDURE — 1123F ACP DISCUSS/DSCN MKR DOCD: CPT | Performed by: FAMILY MEDICINE

## 2020-07-28 PROCEDURE — 3080F DIAST BP >= 90 MM HG: CPT | Performed by: FAMILY MEDICINE

## 2020-07-28 PROCEDURE — 1160F RVW MEDS BY RX/DR IN RCRD: CPT | Performed by: FAMILY MEDICINE

## 2020-07-28 PROCEDURE — G0438 PPPS, INITIAL VISIT: HCPCS | Performed by: FAMILY MEDICINE

## 2020-07-28 PROCEDURE — 3008F BODY MASS INDEX DOCD: CPT | Performed by: FAMILY MEDICINE

## 2020-07-28 PROCEDURE — 99214 OFFICE O/P EST MOD 30 MIN: CPT | Performed by: FAMILY MEDICINE

## 2020-07-28 RX ORDER — PHENTERMINE HYDROCHLORIDE 37.5 MG/1
37.5 TABLET ORAL DAILY
Qty: 90 TABLET | Refills: 0 | Status: SHIPPED | OUTPATIENT
Start: 2020-07-28 | End: 2020-11-11 | Stop reason: SDUPTHER

## 2020-07-28 RX ORDER — FENOFIBRATE 130 MG/1
130 CAPSULE ORAL
Qty: 90 CAPSULE | Refills: 1 | Status: SHIPPED | OUTPATIENT
Start: 2020-07-28 | End: 2021-04-07 | Stop reason: SDUPTHER

## 2020-07-28 RX ORDER — LORAZEPAM 1 MG/1
1 TABLET ORAL AS NEEDED
Qty: 30 TABLET | Refills: 0 | Status: SHIPPED | OUTPATIENT
Start: 2020-07-28 | End: 2021-04-07 | Stop reason: SDUPTHER

## 2020-07-28 RX ORDER — EZETIMIBE AND SIMVASTATIN 10; 40 MG/1; MG/1
1 TABLET ORAL
Qty: 90 TABLET | Refills: 1 | Status: SHIPPED | OUTPATIENT
Start: 2020-07-28 | End: 2021-04-07 | Stop reason: SDUPTHER

## 2020-07-28 RX ORDER — BUTALBITAL, ACETAMINOPHEN AND CAFFEINE 50; 325; 40 MG/1; MG/1; MG/1
1 TABLET ORAL EVERY 6 HOURS PRN
Qty: 60 TABLET | Refills: 0 | Status: SHIPPED | OUTPATIENT
Start: 2020-07-28 | End: 2020-11-11 | Stop reason: SDUPTHER

## 2020-07-28 RX ORDER — LEVOTHYROXINE SODIUM 0.05 MG/1
50 TABLET ORAL DAILY
Qty: 90 TABLET | Refills: 1 | Status: SHIPPED | OUTPATIENT
Start: 2020-07-28 | End: 2021-04-07 | Stop reason: SDUPTHER

## 2020-07-28 NOTE — PROGRESS NOTES
Assessment and Plan:     Problem List Items Addressed This Visit     None      Visit Diagnoses     Medicare annual wellness visit, initial    -  Primary    Physical exam, annual        Anxiety               Preventive health issues were discussed with patient, and age appropriate screening tests were ordered as noted in patient's After Visit Summary  Personalized health advice and appropriate referrals for health education or preventive services given if needed, as noted in patient's After Visit Summary  History of Present Illness:     Patient presents for Medicare Annual Wellness visit    Patient Care Team:  Sunny Villar DO as PCP - General (Family Medicine)     Problem List:     There is no problem list on file for this patient       Past Medical and Surgical History:     Past Medical History:   Diagnosis Date    Asthma     Depression     Disease of thyroid gland     Hypercholesteremia     Migraine     Osteoporosis      Past Surgical History:   Procedure Laterality Date    FINGER FRACTURE SURGERY      HYSTERECTOMY      LEG SURGERY      TOTAL ABDOMINAL HYSTERECTOMY W/ BILATERAL SALPINGOOPHORECTOMY        Family History:     Family History   Problem Relation Age of Onset    No Known Problems Mother     Ovarian cancer Maternal Grandmother     No Known Problems Paternal Grandmother     No Known Problems Paternal Aunt     No Known Problems Paternal Aunt       Social History:     E-Cigarette/Vaping    E-Cigarette Use Never User      E-Cigarette/Vaping Substances    Nicotine No     THC No     CBD No     Flavoring No     Other No     Unknown No      Social History     Socioeconomic History    Marital status: /Civil Union     Spouse name: None    Number of children: None    Years of education: None    Highest education level: None   Occupational History    None   Social Needs    Financial resource strain: None    Food insecurity:     Worry: None     Inability: None    Transportation needs:     Medical: None     Non-medical: None   Tobacco Use    Smoking status: Former Smoker    Smokeless tobacco: Never Used   Substance and Sexual Activity    Alcohol use: Yes     Frequency: Monthly or less     Drinks per session: 1 or 2     Binge frequency: Less than monthly     Comment: social    Drug use: No    Sexual activity: Yes     Partners: Male   Lifestyle    Physical activity:     Days per week: None     Minutes per session: None    Stress: None   Relationships    Social connections:     Talks on phone: None     Gets together: None     Attends [employer] service: None     Active member of club or organization: None     Attends meetings of clubs or organizations: None     Relationship status: None    Intimate partner violence:     Fear of current or ex partner: None     Emotionally abused: None     Physically abused: None     Forced sexual activity: None   Other Topics Concern    None   Social History Narrative    None      Medications and Allergies:     Current Outpatient Medications   Medication Sig Dispense Refill    butalbital-acetaminophen-caffeine (FIORICET,ESGIC) -40 mg per tablet prn for HA      Calcium 200 MG TABS Take by mouth      Cholecalciferol (VITAMIN D PO) Take by mouth      Coenzyme Q10 (COQ10) 100 MG CAPS Take by mouth      ESTROGENS CONJUGATED PO Take 1 tablet by mouth once a week      ezetimibe-simvastatin (VYTORIN) 10-40 mg per tablet Take by mouth      fenofibrate micronized (ANTARA) 130 MG capsule Take by mouth      fluticasone (FLONASE) 50 mcg/act nasal spray       ibandronate (BONIVA) 150 MG tablet Take 150 mg by mouth every 30 (thirty) days   3    levothyroxine (Synthroid) 50 mcg tablet Take 50 mcg by mouth       liraglutide (Saxenda) injection Inject 2 4 mg under the skin daily       LORazepam (ATIVAN) 1 mg tablet Take 1 tablet (1 mg total) by mouth daily at bedtime (Patient taking differently: Take 1 mg by mouth as needed ) 15 tablet 0    Magnesium 100 MG TABS Take by mouth      montelukast (SINGULAIR) 10 mg tablet as needed       Multiple Vitamin (MULTI-VITAMIN DAILY PO) once daily      naproxen (NAPROSYN) 500 mg tablet       nicotine polacrilex (NICORETTE) 2 mg gum 2mg per piece 6-8 pieces daily      Omega-3 1000 MG CAPS Take by mouth      phentermine (ADIPEX-P) 37 5 MG tablet   3    Potassium Gluconate 80 MG TABS Take by mouth      progesterone (PROMETRIUM) 100 MG capsule Take 150 mg by mouth daily      SUMAtriptan Succinate (IMITREX IJ) Inject as directed       No current facility-administered medications for this visit  Allergies   Allergen Reactions    Bee Venom     Latex       Immunizations:     Immunization History   Administered Date(s) Administered    INFLUENZA 10/06/2018    influenza, injectable, quadrivalent 10/06/2018      Health Maintenance:         Topic Date Due    MAMMOGRAM  11/11/2021    CRC Screening: Colonoscopy  01/11/2028    Hepatitis C Screening  Completed         Topic Date Due    DTaP,Tdap,and Td Vaccines (1 - Tdap) 12/21/1961    Pneumococcal Vaccine: 65+ Years (1 of 2 - PCV13) 12/21/2017    Influenza Vaccine  07/01/2020      Medicare Health Risk Assessment:     /90 (BP Location: Left arm, Patient Position: Sitting, Cuff Size: Large)   Pulse 101   Temp 99 8 °F (37 7 °C) (Tympanic)   Ht 5' 1" (1 549 m)   Wt 64 kg (141 lb)   SpO2 95%   BMI 26 64 kg/m²      Willem Jane is here for her Initial Wellness visit  Health Risk Assessment:   Patient rates overall health as very good  Patient feels that their physical health rating is slightly worse  Eyesight was rated as much worse  Hearing was rated as same  Patient feels that their emotional and mental health rating is same  Pain experienced in the last 7 days has been none  Patient states that she has experienced no weight loss or gain in last 6 months  Depression Screening:   PHQ-2 Score: 0      Fall Risk Screening:    In the past year, patient has experienced: no history of falling in past year      Urinary Incontinence Screening:   Patient has not leaked urine accidently in the last six months  Home Safety:  Patient does not have trouble with stairs inside or outside of their home  Patient has working smoke alarms and has working carbon monoxide detector  Home safety hazards include: none  Nutrition:   Current diet is Regular  Medications:   Patient is currently taking over-the-counter supplements  OTC medications include: see medication list  Patient is able to manage medications  Activities of Daily Living (ADLs)/Instrumental Activities of Daily Living (IADLs):   Walk and transfer into and out of bed and chair?: Yes  Dress and groom yourself?: Yes    Bathe or shower yourself?: Yes    Feed yourself?  Yes  Do your laundry/housekeeping?: Yes  Manage your money, pay your bills and track your expenses?: Yes  Make your own meals?: Yes    Do your own shopping?: Yes    Previous Hospitalizations:   Any hospitalizations or ED visits within the last 12 months?: No      Advance Care Planning:   Living will: No    Durable POA for healthcare: No    Advanced directive: No    Advanced directive counseling given: Yes    Five wishes given: Yes    Patient declined ACP directive: No    End of Life Decisions reviewed with patient: No    Provider agrees with end of life decisions: No      Cognitive Screening:   Provider or family/friend/caregiver concerned regarding cognition?: No    PREVENTIVE SCREENINGS      Cardiovascular Screening:    General: Screening Not Indicated and History Lipid Disorder      Diabetes Screening:     General: Screening Not Indicated and History Diabetes      Colorectal Cancer Screening:     General: Screening Current      Breast Cancer Screening:     General: Screening Current      Cervical Cancer Screening:    General: Screening Not Indicated      Osteoporosis Screening:    General: Screening Not Indicated and History Osteoporosis      Abdominal Aortic Aneurysm (AAA) Screening:        General: Screening Not Indicated      Lung Cancer Screening:     General: Screening Not Indicated      Hepatitis C Screening:    General: Screening Current      Valerie Deleon DO

## 2020-07-28 NOTE — PROGRESS NOTES
Assessment/Plan:    No problem-specific Assessment & Plan notes found for this encounter  Diagnoses and all orders for this visit:    Medicare annual wellness visit, initial    Physical exam, annual  -     LORazepam (ATIVAN) 1 mg tablet; Take 1 tablet (1 mg total) by mouth as needed for anxiety    Anxiety  -     LORazepam (ATIVAN) 1 mg tablet; Take 1 tablet (1 mg total) by mouth as needed for anxiety    Acquired hypothyroidism  -     levothyroxine (Synthroid) 50 mcg tablet; Take 1 tablet (50 mcg total) by mouth daily    Hypercholesterolemia  -     ezetimibe-simvastatin (Vytorin) 10-40 mg per tablet; Take 1 tablet by mouth daily at bedtime    Hypercalcemia  Comments:  stop saxcenda  Orders:  -     PTH, intact; Future    Hypertriglyceridemia  -     fenofibrate micronized (Antara) 130 MG capsule; Take 1 capsule (130 mg total) by mouth daily with breakfast    Weight loss  -     phentermine (ADIPEX-P) 37 5 MG tablet; Take 1 tablet (37 5 mg total) by mouth daily    Other orders  -     SUMAtriptan Succinate (IMITREX IJ); Inject as directed  -     ESTROGENS CONJUGATED PO; Take 1 tablet by mouth once a week  -     progesterone (PROMETRIUM) 100 MG capsule; Take 150 mg by mouth daily  -     butalbital-acetaminophen-caffeine (FIORICET,ESGIC) -40 mg per tablet; Take 1 tablet by mouth every 6 (six) hours as needed for headaches          PHQ-9 Depression Screening    PHQ-9:    Frequency of the following problems over the past two weeks:       Little interest or pleasure in doing things:  0 - not at all  Feeling down, depressed, or hopeless:  0 - not at all  PHQ-2 Score:  0            Subjective:      Patient ID: Pedro Luis Lawler is a 81 y o  female      HPI    The following portions of the patient's history were reviewed and updated as appropriate: allergies, current medications, past family history, past medical history, past social history, past surgical history and problem list     Review of Systems Constitutional: Negative for chills, fatigue and fever  HENT: Negative  Eyes: Negative  Respiratory: Negative for shortness of breath and wheezing  Cardiovascular: Negative for chest pain and palpitations  Gastrointestinal: Negative for abdominal pain, blood in stool, constipation, diarrhea, nausea and vomiting  Endocrine: Negative  Genitourinary: Negative for difficulty urinating and dysuria  Musculoskeletal: Negative for arthralgias and myalgias  Skin: Negative  Allergic/Immunologic: Negative  Neurological: Negative for seizures and syncope  Hematological: Negative for adenopathy  Psychiatric/Behavioral: Negative  Objective:    /90 (BP Location: Left arm, Patient Position: Sitting, Cuff Size: Large)   Pulse 101   Temp 99 8 °F (37 7 °C) (Tympanic)   Ht 5' 1" (1 549 m)   Wt 64 kg (141 lb)   SpO2 95%   BMI 26 64 kg/m²      Physical Exam   Constitutional: She is oriented to person, place, and time  She appears well-developed and well-nourished  No distress  HENT:   Head: Normocephalic and atraumatic  Right Ear: External ear normal    Left Ear: External ear normal    Nose: Nose normal    Mouth/Throat: Oropharynx is clear and moist    Eyes: Pupils are equal, round, and reactive to light  Conjunctivae and EOM are normal    Neck: Normal range of motion  Neck supple  Cardiovascular: Normal rate, regular rhythm and normal heart sounds  No murmur heard  Pulmonary/Chest: Effort normal and breath sounds normal  No respiratory distress  She has no wheezes  She has no rales  Abdominal: Soft  Bowel sounds are normal  She exhibits no distension and no mass  There is no tenderness  There is no rebound and no guarding  Musculoskeletal: Normal range of motion  She exhibits no edema  Lymphadenopathy:     She has no cervical adenopathy  Neurological: She is alert and oriented to person, place, and time  She has normal reflexes  She exhibits normal muscle tone  Skin: Skin is warm and dry  She is not diaphoretic  Psychiatric: She has a normal mood and affect  Her behavior is normal  Judgment and thought content normal    Nursing note and vitals reviewed

## 2020-07-28 NOTE — PATIENT INSTRUCTIONS
Medicare Preventive Visit Patient Instructions  Thank you for completing your Welcome to Medicare Visit or Medicare Annual Wellness Visit today  Your next wellness visit will be due in one year (7/28/2021)  The screening/preventive services that you may require over the next 5-10 years are detailed below  Some tests may not apply to you based off risk factors and/or age  Screening tests ordered at today's visit but not completed yet may show as past due  Also, please note that scanned in results may not display below  Preventive Screenings:  Service Recommendations Previous Testing/Comments   Colorectal Cancer Screening  * Colonoscopy    * Fecal Occult Blood Test (FOBT)/Fecal Immunochemical Test (FIT)  * Fecal DNA/Cologuard Test  * Flexible Sigmoidoscopy Age: 54-65 years old   Colonoscopy: every 10 years (may be performed more frequently if at higher risk)  OR  FOBT/FIT: every 1 year  OR  Cologuard: every 3 years  OR  Sigmoidoscopy: every 5 years  Screening may be recommended earlier than age 48 if at higher risk for colorectal cancer  Also, an individualized decision between you and your healthcare provider will decide whether screening between the ages of 74-80 would be appropriate  Colonoscopy: 01/11/2018  FOBT/FIT: Not on file  Cologuard: Not on file  Sigmoidoscopy: Not on file    Screening Current     Breast Cancer Screening Age: 36 years old  Frequency: every 1-2 years  Not required if history of left and right mastectomy Mammogram: 11/11/2019    Screening Current   Cervical Cancer Screening Between the ages of 21-29, pap smear recommended once every 3 years  Between the ages of 33-67, can perform pap smear with HPV co-testing every 5 years     Recommendations may differ for women with a history of total hysterectomy, cervical cancer, or abnormal pap smears in past  Pap Smear: 11/01/2019    Screening Not Indicated   Hepatitis C Screening Once for adults born between 1945 and 1965  More frequently in patients at high risk for Hepatitis C Hep C Antibody: 07/21/2020    Screening Current   Diabetes Screening 1-2 times per year if you're at risk for diabetes or have pre-diabetes Fasting glucose: 85 mg/dL   A1C: 5 1 %    Screening Not Indicated  History Diabetes   Cholesterol Screening Once every 5 years if you don't have a lipid disorder  May order more often based on risk factors  Lipid panel: 07/21/2020    Screening Not Indicated  History Lipid Disorder     Other Preventive Screenings Covered by Medicare:  1  Abdominal Aortic Aneurysm (AAA) Screening: covered once if your at risk  You're considered to be at risk if you have a family history of AAA  2  Lung Cancer Screening: covers low dose CT scan once per year if you meet all of the following conditions: (1) Age 50-69; (2) No signs or symptoms of lung cancer; (3) Current smoker or have quit smoking within the last 15 years; (4) You have a tobacco smoking history of at least 30 pack years (packs per day multiplied by number of years you smoked); (5) You get a written order from a healthcare provider  3  Glaucoma Screening: covered annually if you're considered high risk: (1) You have diabetes OR (2) Family history of glaucoma OR (3)  aged 48 and older OR (3)  American aged 72 and older  3  Osteoporosis Screening: covered every 2 years if you meet one of the following conditions: (1) You're estrogen deficient and at risk for osteoporosis based off medical history and other findings; (2) Have a vertebral abnormality; (3) On glucocorticoid therapy for more than 3 months; (4) Have primary hyperparathyroidism; (5) On osteoporosis medications and need to assess response to drug therapy  · Last bone density test (DXA Scan): 09/28/2018  5  HIV Screening: covered annually if you're between the age of 12-76  Also covered annually if you are younger than 13 and older than 72 with risk factors for HIV infection   For pregnant patients, it is covered up to 3 times per pregnancy  Immunizations:  Immunization Recommendations   Influenza Vaccine Annual influenza vaccination during flu season is recommended for all persons aged >= 6 months who do not have contraindications   Pneumococcal Vaccine (Prevnar and Pneumovax)  * Prevnar = PCV13  * Pneumovax = PPSV23   Adults 25-60 years old: 1-3 doses may be recommended based on certain risk factors  Adults 72 years old: Prevnar (PCV13) vaccine recommended followed by Pneumovax (PPSV23) vaccine  If already received PPSV23 since turning 65, then PCV13 recommended at least one year after PPSV23 dose  Hepatitis B Vaccine 3 dose series if at intermediate or high risk (ex: diabetes, end stage renal disease, liver disease)   Tetanus (Td) Vaccine - COST NOT COVERED BY MEDICARE PART B Following completion of primary series, a booster dose should be given every 10 years to maintain immunity against tetanus  Td may also be given as tetanus wound prophylaxis  Tdap Vaccine - COST NOT COVERED BY MEDICARE PART B Recommended at least once for all adults  For pregnant patients, recommended with each pregnancy  Shingles Vaccine (Shingrix) - COST NOT COVERED BY MEDICARE PART B  2 shot series recommended in those aged 48 and above     Health Maintenance Due:      Topic Date Due    MAMMOGRAM  11/11/2021    CRC Screening: Colonoscopy  01/11/2028    Hepatitis C Screening  Completed     Immunizations Due:      Topic Date Due    DTaP,Tdap,and Td Vaccines (1 - Tdap) 12/21/1961    Pneumococcal Vaccine: 65+ Years (1 of 2 - PCV13) 12/21/2017    Influenza Vaccine  07/01/2020     Advance Directives   What are advance directives? Advance directives are legal documents that state your wishes and plans for medical care  These plans are made ahead of time in case you lose your ability to make decisions for yourself   Advance directives can apply to any medical decision, such as the treatments you want, and if you want to donate organs  What are the types of advance directives? There are many types of advance directives, and each state has rules about how to use them  You may choose a combination of any of the following:  · Living will: This is a written record of the treatment you want  You can also choose which treatments you do not want, which to limit, and which to stop at a certain time  This includes surgery, medicine, IV fluid, and tube feedings  · Durable power of  for healthcare [facility]): This is a written record that states who you want to make healthcare choices for you when you are unable to make them for yourself  This person, called a proxy, is usually a family member or a friend  You may choose more than 1 proxy  · Do not resuscitate (DNR) order:  A DNR order is used in case your heart stops beating or you stop breathing  It is a request not to have certain forms of treatment, such as CPR  A DNR order may be included in other types of advance directives  · Medical directive: This covers the care that you want if you are in a coma, near death, or unable to make decisions for yourself  You can list the treatments you want for each condition  Treatment may include pain medicine, surgery, blood transfusions, dialysis, IV or tube feedings, and a ventilator (breathing machine)  · Values history: This document has questions about your views, beliefs, and how you feel and think about life  This information can help others choose the care that you would choose  Why are advance directives important? An advance directive helps you control your care  Although spoken wishes may be used, it is better to have your wishes written down  Spoken wishes can be misunderstood, or not followed  Treatments may be given even if you do not want them  An advance directive may make it easier for your family to make difficult choices about your care     Weight Management   Why it is important to manage your weight:  Being overweight increases your risk of health conditions such as heart disease, high blood pressure, type 2 diabetes, and certain types of cancer  It can also increase your risk for osteoarthritis, sleep apnea, and other respiratory problems  Aim for a slow, steady weight loss  Even a small amount of weight loss can lower your risk of health problems  How to lose weight safely:  A safe and healthy way to lose weight is to eat fewer calories and get regular exercise  You can lose up about 1 pound a week by decreasing the number of calories you eat by 500 calories each day  Healthy meal plan for weight management:  A healthy meal plan includes a variety of foods, contains fewer calories, and helps you stay healthy  A healthy meal plan includes the following:  · Eat whole-grain foods more often  A healthy meal plan should contain fiber  Fiber is the part of grains, fruits, and vegetables that is not broken down by your body  Whole-grain foods are healthy and provide extra fiber in your diet  Some examples of whole-grain foods are whole-wheat breads and pastas, oatmeal, brown rice, and bulgur  · Eat a variety of vegetables every day  Include dark, leafy greens such as spinach, kale, cherie greens, and mustard greens  Eat yellow and orange vegetables such as carrots, sweet potatoes, and winter squash  · Eat a variety of fruits every day  Choose fresh or canned fruit (canned in its own juice or light syrup) instead of juice  Fruit juice has very little or no fiber  · Eat low-fat dairy foods  Drink fat-free (skim) milk or 1% milk  Eat fat-free yogurt and low-fat cottage cheese  Try low-fat cheeses such as mozzarella and other reduced-fat cheeses  · Choose meat and other protein foods that are low in fat  Choose beans or other legumes such as split peas or lentils  Choose fish, skinless poultry (chicken or turkey), or lean cuts of red meat (beef or pork)  Before you cook meat or poultry, cut off any visible fat     · Use less fat and oil  Try baking foods instead of frying them  Add less fat, such as margarine, sour cream, regular salad dressing and mayonnaise to foods  Eat fewer high-fat foods  Some examples of high-fat foods include french fries, doughnuts, ice cream, and cakes  · Eat fewer sweets  Limit foods and drinks that are high in sugar  This includes candy, cookies, regular soda, and sweetened drinks  Exercise:  Exercise at least 30 minutes per day on most days of the week  Some examples of exercise include walking, biking, dancing, and swimming  You can also fit in more physical activity by taking the stairs instead of the elevator or parking farther away from stores  Ask your healthcare provider about the best exercise plan for you  © Copyright [employer] 2018 Information is for End User's use only and may not be sold, redistributed or otherwise used for commercial purposes   All illustrations and images included in CareNotes® are the copyrighted property of [employer]  or [address] [place] [address]

## 2020-08-20 ENCOUNTER — APPOINTMENT (OUTPATIENT)
Dept: LAB | Facility: HOSPITAL | Age: 70
End: 2020-08-20
Attending: FAMILY MEDICINE
Payer: MEDICARE

## 2020-08-20 DIAGNOSIS — E83.52 HYPERCALCEMIA: ICD-10-CM

## 2020-08-20 LAB — PTH-INTACT SERPL-MCNC: 69 PG/ML (ref 18.4–80.1)

## 2020-08-20 PROCEDURE — 83970 ASSAY OF PARATHORMONE: CPT

## 2020-08-20 PROCEDURE — 36415 COLL VENOUS BLD VENIPUNCTURE: CPT

## 2020-08-25 ENCOUNTER — OFFICE VISIT (OUTPATIENT)
Dept: FAMILY MEDICINE CLINIC | Facility: CLINIC | Age: 70
End: 2020-08-25
Payer: MEDICARE

## 2020-08-25 VITALS
SYSTOLIC BLOOD PRESSURE: 144 MMHG | OXYGEN SATURATION: 99 % | HEIGHT: 61 IN | WEIGHT: 144.2 LBS | HEART RATE: 110 BPM | DIASTOLIC BLOOD PRESSURE: 88 MMHG | TEMPERATURE: 98.7 F | BODY MASS INDEX: 27.23 KG/M2

## 2020-08-25 DIAGNOSIS — Z13.820 SCREENING FOR OSTEOPOROSIS: ICD-10-CM

## 2020-08-25 DIAGNOSIS — Z23 ENCOUNTER FOR IMMUNIZATION: ICD-10-CM

## 2020-08-25 DIAGNOSIS — E83.52 HYPERCALCEMIA: Primary | ICD-10-CM

## 2020-08-25 DIAGNOSIS — E03.9 ACQUIRED HYPOTHYROIDISM: ICD-10-CM

## 2020-08-25 PROCEDURE — 1036F TOBACCO NON-USER: CPT | Performed by: FAMILY MEDICINE

## 2020-08-25 PROCEDURE — 1160F RVW MEDS BY RX/DR IN RCRD: CPT | Performed by: FAMILY MEDICINE

## 2020-08-25 PROCEDURE — G0009 ADMIN PNEUMOCOCCAL VACCINE: HCPCS

## 2020-08-25 PROCEDURE — 99213 OFFICE O/P EST LOW 20 MIN: CPT | Performed by: FAMILY MEDICINE

## 2020-08-25 PROCEDURE — 3079F DIAST BP 80-89 MM HG: CPT | Performed by: FAMILY MEDICINE

## 2020-08-25 PROCEDURE — 90670 PCV13 VACCINE IM: CPT

## 2020-08-25 PROCEDURE — 4040F PNEUMOC VAC/ADMIN/RCVD: CPT | Performed by: FAMILY MEDICINE

## 2020-08-25 PROCEDURE — 1170F FXNL STATUS ASSESSED: CPT | Performed by: FAMILY MEDICINE

## 2020-08-25 PROCEDURE — 3077F SYST BP >= 140 MM HG: CPT | Performed by: FAMILY MEDICINE

## 2020-08-25 NOTE — PROGRESS NOTES
Assessment/Plan:    No problem-specific Assessment & Plan notes found for this encounter  Diagnoses and all orders for this visit:    Hypercalcemia    Acquired hypothyroidism  Comments:  no change in the medication  Orders:  -     CALCIUM & CALCIUM IONIZED; Future    Encounter for immunization  -     PNEUMOCOCCAL CONJUGATE VACCINE 13-VALENT GREATER THAN 6 MONTHS          PHQ-9 Depression Screening    PHQ-9:    Frequency of the following problems over the past two weeks:       Little interest or pleasure in doing things:  0 - not at all  Feeling down, depressed, or hopeless:  0 - not at all  PHQ-2 Score:  0            Subjective:      Patient ID: Lul Mccurdy is a 81 y o  female  Follow up for hypercalcemia, pt stopped saxcenda which could cause this, she had a normal PTH, blood pressure is still high and pt is not checking it at home      The following portions of the patient's history were reviewed and updated as appropriate: allergies, current medications, past family history, past medical history, past social history, past surgical history and problem list     Review of Systems   Cardiovascular: Negative for chest pain and palpitations  Gastrointestinal: Negative for nausea and vomiting  Objective:    /88   Pulse (!) 110   Temp 98 7 °F (37 1 °C)   Ht 5' 1" (1 549 m)   Wt 65 4 kg (144 lb 3 2 oz)   SpO2 99%   BMI 27 25 kg/m²      Physical Exam  Vitals signs and nursing note reviewed  Constitutional:       Appearance: She is well-developed  HENT:      Head: Normocephalic and atraumatic  Right Ear: External ear normal       Left Ear: External ear normal       Nose: Nose normal    Eyes:      Conjunctiva/sclera: Conjunctivae normal       Pupils: Pupils are equal, round, and reactive to light  Neck:      Musculoskeletal: Normal range of motion and neck supple  Cardiovascular:      Rate and Rhythm: Normal rate and regular rhythm  Heart sounds: Normal heart sounds   No murmur  Pulmonary:      Effort: Pulmonary effort is normal  No respiratory distress  Breath sounds: Normal breath sounds  No wheezing or rales  Abdominal:      General: Bowel sounds are normal  There is no distension  Palpations: Abdomen is soft  There is no mass  Tenderness: There is no abdominal tenderness  There is no guarding or rebound  Musculoskeletal: Normal range of motion  Skin:     General: Skin is warm and dry  Neurological:      Mental Status: She is alert and oriented to person, place, and time  Motor: No abnormal muscle tone  Deep Tendon Reflexes: Reflexes are normal and symmetric  Psychiatric:         Behavior: Behavior normal          Thought Content:  Thought content normal          Judgment: Judgment normal

## 2020-09-29 ENCOUNTER — APPOINTMENT (OUTPATIENT)
Dept: LAB | Facility: HOSPITAL | Age: 70
End: 2020-09-29
Attending: FAMILY MEDICINE
Payer: MEDICARE

## 2020-09-29 ENCOUNTER — HOSPITAL ENCOUNTER (OUTPATIENT)
Dept: BONE DENSITY | Facility: HOSPITAL | Age: 70
Discharge: HOME | End: 2020-09-29
Attending: FAMILY MEDICINE
Payer: MEDICARE

## 2020-09-29 DIAGNOSIS — Z13.820 SCREENING FOR OSTEOPOROSIS: ICD-10-CM

## 2020-09-29 DIAGNOSIS — E03.9 ACQUIRED HYPOTHYROIDISM: ICD-10-CM

## 2020-09-29 LAB
CA-I BLD-SCNC: 1.24 MMOL/L (ref 1.12–1.32)
CALCIUM SERPL-MCNC: 9.8 MG/DL (ref 8.6–10.5)

## 2020-09-29 PROCEDURE — 36415 COLL VENOUS BLD VENIPUNCTURE: CPT

## 2020-09-29 PROCEDURE — 77080 DXA BONE DENSITY AXIAL: CPT

## 2020-09-29 PROCEDURE — 82330 ASSAY OF CALCIUM: CPT

## 2020-09-29 PROCEDURE — 82310 ASSAY OF CALCIUM: CPT

## 2020-10-06 ENCOUNTER — OFFICE VISIT (OUTPATIENT)
Dept: FAMILY MEDICINE CLINIC | Facility: CLINIC | Age: 70
End: 2020-10-06
Payer: MEDICARE

## 2020-10-06 VITALS
BODY MASS INDEX: 28.13 KG/M2 | DIASTOLIC BLOOD PRESSURE: 78 MMHG | TEMPERATURE: 96.8 F | WEIGHT: 149 LBS | SYSTOLIC BLOOD PRESSURE: 122 MMHG | HEIGHT: 61 IN

## 2020-10-06 DIAGNOSIS — Z23 ENCOUNTER FOR IMMUNIZATION: ICD-10-CM

## 2020-10-06 DIAGNOSIS — E83.52 HYPERCALCEMIA: Primary | ICD-10-CM
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
## 2020-10-06 DIAGNOSIS — E03.9 ACQUIRED HYPOTHYROIDISM: ICD-10-CM

## 2020-10-06 PROCEDURE — 99213 OFFICE O/P EST LOW 20 MIN: CPT | Performed by: FAMILY MEDICINE

## 2020-10-06 PROCEDURE — 90662 IIV NO PRSV INCREASED AG IM: CPT

## 2020-10-06 PROCEDURE — G0008 ADMIN INFLUENZA VIRUS VAC: HCPCS

## 2020-10-28 ENCOUNTER — TRANSCRIBE ORDERS (OUTPATIENT)
Dept: ADMINISTRATIVE | Facility: HOSPITAL | Age: 70
End: 2020-10-28

## 2020-10-28 DIAGNOSIS — Z12.31 ENCOUNTER FOR SCREENING MAMMOGRAM FOR MALIGNANT NEOPLASM OF BREAST: Primary | ICD-10-CM

## 2020-11-03 ENCOUNTER — ANNUAL EXAM (OUTPATIENT)
Dept: OBGYN CLINIC | Facility: MEDICAL CENTER | Age: 70
End: 2020-11-03
Payer: MEDICARE

## 2020-11-03 VITALS
TEMPERATURE: 97.6 F | BODY MASS INDEX: 27.98 KG/M2 | DIASTOLIC BLOOD PRESSURE: 80 MMHG | HEIGHT: 61 IN | WEIGHT: 148.2 LBS | SYSTOLIC BLOOD PRESSURE: 140 MMHG

## 2020-11-03 DIAGNOSIS — Z01.419 ENCOUNTER FOR GYNECOLOGICAL EXAMINATION: Primary | ICD-10-CM

## 2020-11-03 DIAGNOSIS — M81.8 OTHER OSTEOPOROSIS WITHOUT CURRENT PATHOLOGICAL FRACTURE: ICD-10-CM

## 2020-11-03 PROCEDURE — G0101 CA SCREEN;PELVIC/BREAST EXAM: HCPCS | Performed by: OBSTETRICS & GYNECOLOGY

## 2020-11-03 RX ORDER — IBANDRONATE SODIUM 150 MG/1
150 TABLET, FILM COATED ORAL
Qty: 1 TABLET | Refills: 6 | Status: SHIPPED | OUTPATIENT
Start: 2020-11-03 | End: 2021-04-07 | Stop reason: SDUPTHER

## 2020-11-11 DIAGNOSIS — R63.4 WEIGHT LOSS: ICD-10-CM

## 2020-11-11 DIAGNOSIS — G43.809 OTHER MIGRAINE WITHOUT STATUS MIGRAINOSUS, NOT INTRACTABLE: ICD-10-CM

## 2020-11-11 RX ORDER — PHENTERMINE HYDROCHLORIDE 37.5 MG/1
37.5 TABLET ORAL DAILY
Qty: 90 TABLET | Refills: 0 | Status: SHIPPED | OUTPATIENT
Start: 2020-11-11 | End: 2021-02-08 | Stop reason: SDUPTHER

## 2020-11-11 RX ORDER — BUTALBITAL, ACETAMINOPHEN AND CAFFEINE 50; 325; 40 MG/1; MG/1; MG/1
1 TABLET ORAL EVERY 6 HOURS PRN
Qty: 60 TABLET | Refills: 0 | Status: SHIPPED | OUTPATIENT
Start: 2020-11-11 | End: 2021-04-07 | Stop reason: SDUPTHER

## 2020-11-12 ENCOUNTER — HOSPITAL ENCOUNTER (OUTPATIENT)
Dept: MAMMOGRAPHY | Facility: HOSPITAL | Age: 70
Discharge: HOME | End: 2020-11-12
Payer: MEDICARE

## 2020-11-12 ENCOUNTER — APPOINTMENT (OUTPATIENT)
Dept: LAB | Facility: HOSPITAL | Age: 70
End: 2020-11-12
Attending: FAMILY MEDICINE
Payer: MEDICARE

## 2020-11-12 VITALS — WEIGHT: 148 LBS | BODY MASS INDEX: 27.94 KG/M2 | HEIGHT: 61 IN

## 2020-11-12 DIAGNOSIS — Z12.31 ENCOUNTER FOR SCREENING MAMMOGRAM FOR MALIGNANT NEOPLASM OF BREAST: ICD-10-CM
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
## 2020-11-12 PROCEDURE — 36415 COLL VENOUS BLD VENIPUNCTURE: CPT

## 2020-11-12 PROCEDURE — 77067 SCR MAMMO BI INCL CAD: CPT

## 2020-11-12 PROCEDURE — 77063 BREAST TOMOSYNTHESIS BI: CPT

## 2020-11-12 PROCEDURE — 86618 LYME DISEASE ANTIBODY: CPT

## 2020-11-13 LAB — B BURGDOR IGG+IGM SER-ACNC: 48

## 2020-12-18 ENCOUNTER — TRANSCRIBE ORDERS (OUTPATIENT)
Dept: FAMILY MEDICINE CLINIC | Facility: CLINIC | Age: 70
End: 2020-12-18

## 2020-12-18 DIAGNOSIS — R05.9 COUGH: ICD-10-CM

## 2020-12-18 DIAGNOSIS — R53.83 FATIGUE, UNSPECIFIED TYPE: ICD-10-CM

## 2020-12-18 DIAGNOSIS — R52 GENERALIZED BODY ACHES: ICD-10-CM

## 2020-12-18 DIAGNOSIS — R50.9 FEVER AND CHILLS: Primary | ICD-10-CM

## 2020-12-18 DIAGNOSIS — R50.9 FEVER AND CHILLS: ICD-10-CM

## 2020-12-18 PROCEDURE — 87637 SARSCOV2&INF A&B&RSV AMP PRB: CPT | Performed by: FAMILY MEDICINE

## 2020-12-20 LAB
FLUAV RNA NPH QL NAA+PROBE: NOT DETECTED
FLUBV RNA NPH QL NAA+PROBE: NOT DETECTED
RSV RNA NPH QL NAA+PROBE: NOT DETECTED
SARS-COV-2 RNA NPH QL NAA+PROBE: DETECTED

## 2021-02-08 DIAGNOSIS — R63.4 WEIGHT LOSS: ICD-10-CM

## 2021-02-09 RX ORDER — PHENTERMINE HYDROCHLORIDE 37.5 MG/1
37.5 TABLET ORAL DAILY
Qty: 90 TABLET | Refills: 0 | Status: SHIPPED | OUTPATIENT
Start: 2021-02-09 | End: 2021-04-08

## 2021-02-12 DIAGNOSIS — E78.1 HYPERTRIGLYCERIDEMIA: Primary | ICD-10-CM

## 2021-02-15 RX ORDER — OMEGA-3-ACID ETHYL ESTERS 1 G/1
CAPSULE, LIQUID FILLED ORAL
Qty: 360 CAPSULE | Refills: 5 | Status: SHIPPED | OUTPATIENT
Start: 2021-02-15 | End: 2022-01-26 | Stop reason: ALTCHOICE

## 2021-04-06 ENCOUNTER — APPOINTMENT (OUTPATIENT)
Dept: LAB | Facility: HOSPITAL | Age: 71
End: 2021-04-06
Attending: FAMILY MEDICINE
Payer: MEDICARE

## 2021-04-06 DIAGNOSIS — E03.9 ACQUIRED HYPOTHYROIDISM: ICD-10-CM

## 2021-04-06 LAB — TSH SERPL DL<=0.05 MIU/L-ACNC: 0.89 UIU/ML (ref 0.45–5.33)

## 2021-04-06 PROCEDURE — 36415 COLL VENOUS BLD VENIPUNCTURE: CPT

## 2021-04-06 PROCEDURE — 84443 ASSAY THYROID STIM HORMONE: CPT

## 2021-04-07 ENCOUNTER — OFFICE VISIT (OUTPATIENT)
Dept: FAMILY MEDICINE CLINIC | Facility: CLINIC | Age: 71
End: 2021-04-07
Payer: MEDICARE

## 2021-04-07 VITALS
TEMPERATURE: 98.8 F | HEIGHT: 61 IN | DIASTOLIC BLOOD PRESSURE: 88 MMHG | WEIGHT: 143.4 LBS | BODY MASS INDEX: 27.08 KG/M2 | OXYGEN SATURATION: 98 % | SYSTOLIC BLOOD PRESSURE: 144 MMHG | HEART RATE: 103 BPM

## 2021-04-07 DIAGNOSIS — E03.9 ACQUIRED HYPOTHYROIDISM: ICD-10-CM

## 2021-04-07 DIAGNOSIS — Z00.00 PHYSICAL EXAM, ANNUAL: Primary | ICD-10-CM

## 2021-04-07 DIAGNOSIS — R63.4 WEIGHT LOSS: ICD-10-CM

## 2021-04-07 DIAGNOSIS — E78.00 HYPERCHOLESTEROLEMIA: ICD-10-CM

## 2021-04-07 DIAGNOSIS — E66.3 OVERWEIGHT (BMI 25.0-29.9): ICD-10-CM

## 2021-04-07 DIAGNOSIS — G43.809 OTHER MIGRAINE WITHOUT STATUS MIGRAINOSUS, NOT INTRACTABLE: ICD-10-CM

## 2021-04-07 DIAGNOSIS — M81.8 OTHER OSTEOPOROSIS WITHOUT CURRENT PATHOLOGICAL FRACTURE: ICD-10-CM

## 2021-04-07 DIAGNOSIS — Z23 ENCOUNTER FOR IMMUNIZATION: ICD-10-CM

## 2021-04-07 DIAGNOSIS — F41.9 ANXIETY: ICD-10-CM

## 2021-04-07 DIAGNOSIS — E78.1 HYPERTRIGLYCERIDEMIA: ICD-10-CM

## 2021-04-07 PROCEDURE — G0439 PPPS, SUBSEQ VISIT: HCPCS | Performed by: FAMILY MEDICINE

## 2021-04-07 RX ORDER — LEVOTHYROXINE SODIUM 0.05 MG/1
50 TABLET ORAL DAILY
Qty: 90 TABLET | Refills: 1 | Status: SHIPPED | OUTPATIENT
Start: 2021-04-07 | End: 2022-04-04

## 2021-04-07 RX ORDER — FENOFIBRATE 130 MG/1
130 CAPSULE ORAL
Qty: 90 CAPSULE | Refills: 1 | Status: SHIPPED | OUTPATIENT
Start: 2021-04-07 | End: 2022-04-04

## 2021-04-07 RX ORDER — IBANDRONATE SODIUM 150 MG/1
150 TABLET, FILM COATED ORAL
Qty: 1 TABLET | Refills: 6 | Status: SHIPPED | OUTPATIENT
Start: 2021-04-07 | End: 2021-11-22 | Stop reason: SDUPTHER

## 2021-04-07 RX ORDER — LORAZEPAM 1 MG/1
1 TABLET ORAL AS NEEDED
Qty: 30 TABLET | Refills: 0 | Status: SHIPPED | OUTPATIENT
Start: 2021-04-07 | End: 2021-05-05

## 2021-04-07 RX ORDER — BUTALBITAL, ACETAMINOPHEN AND CAFFEINE 50; 325; 40 MG/1; MG/1; MG/1
1 TABLET ORAL EVERY 6 HOURS PRN
Qty: 60 TABLET | Refills: 0 | Status: SHIPPED | OUTPATIENT
Start: 2021-04-07 | End: 2021-05-13 | Stop reason: SDUPTHER

## 2021-04-07 RX ORDER — EZETIMIBE AND SIMVASTATIN 10; 40 MG/1; MG/1
1 TABLET ORAL
Qty: 90 TABLET | Refills: 1 | Status: SHIPPED | OUTPATIENT
Start: 2021-04-07 | End: 2022-04-04

## 2021-04-07 RX ORDER — SERTRALINE HYDROCHLORIDE 25 MG/1
25 TABLET, FILM COATED ORAL DAILY
Qty: 30 TABLET | Refills: 6 | Status: SHIPPED | OUTPATIENT
Start: 2021-04-07 | End: 2021-08-26

## 2021-04-07 NOTE — PROGRESS NOTES
Assessment/Plan:    No problem-specific Assessment & Plan notes found for this encounter  Diagnoses and all orders for this visit:    Physical exam, annual  -     LORazepam (ATIVAN) 1 mg tablet; Take 1 tablet (1 mg total) by mouth as needed for anxiety    Encounter for immunization    Other migraine without status migrainosus, not intractable  -     butalbital-acetaminophen-caffeine (FIORICET,ESGIC) -40 mg per tablet; Take 1 tablet by mouth every 6 (six) hours as needed for headaches    Hypercholesterolemia  -     ezetimibe-simvastatin (Vytorin) 10-40 mg per tablet; Take 1 tablet by mouth daily at bedtime    Hypertriglyceridemia  -     fenofibrate micronized (Antara) 130 MG capsule; Take 1 capsule (130 mg total) by mouth daily with breakfast    Other osteoporosis without current pathological fracture  -     ibandronate (BONIVA) 150 MG tablet; Take 1 tablet (150 mg total) by mouth every 30 (thirty) days    Acquired hypothyroidism  -     levothyroxine (Synthroid) 50 mcg tablet; Take 1 tablet (50 mcg total) by mouth daily    Anxiety  -     LORazepam (ATIVAN) 1 mg tablet; Take 1 tablet (1 mg total) by mouth as needed for anxiety  -     sertraline (ZOLOFT) 25 mg tablet; Take 1 tablet (25 mg total) by mouth daily    Overweight (BMI 25 0-29  9)    Other orders  -     Cancel: TDAP VACCINE GREATER THAN OR EQUAL TO 6YO IM          PHQ-9 Depression Screening    PHQ-9:   Frequency of the following problems over the past two weeks:      Little interest or pleasure in doing things: 1 - several days  Feeling down, depressed, or hopeless: 1 - several days  PHQ-2 Score: 2        BMI Counseling: Body mass index is 27 1 kg/m²  The BMI is above normal  Nutrition recommendations include reducing portion sizes and 3-5 servings of fruits/vegetables daily  Exercise recommendations include moderate aerobic physical activity for 150 minutes/week and exercising 3-5 times per week      Subjective:      Patient ID: Nnamdi Smith is a 78 y o  female  Hypertension  This is a chronic problem  The current episode started more than 1 year ago  Pertinent negatives include no chest pain, headaches or palpitations  The following portions of the patient's history were reviewed and updated as appropriate: allergies, current medications, past family history, past medical history, past social history, past surgical history and problem list     Review of Systems   Eyes: Negative for visual disturbance  Cardiovascular: Negative for chest pain and palpitations  Neurological: Negative for headaches  Objective:    /88 (BP Location: Left arm, Patient Position: Sitting, Cuff Size: Standard)   Pulse 103   Temp 98 8 °F (37 1 °C)   Ht 5' 1" (1 549 m)   Wt 65 kg (143 lb 6 4 oz)   SpO2 98%   BMI 27 10 kg/m²      Physical Exam  Vitals signs and nursing note reviewed  Constitutional:       General: She is not in acute distress  Appearance: She is well-developed  She is not diaphoretic  HENT:      Head: Normocephalic and atraumatic  Eyes:      General: No scleral icterus  Conjunctiva/sclera: Conjunctivae normal       Pupils: Pupils are equal, round, and reactive to light  Neck:      Musculoskeletal: Normal range of motion and neck supple  Cardiovascular:      Rate and Rhythm: Normal rate and regular rhythm  Heart sounds: Normal heart sounds  No murmur  Pulmonary:      Effort: Pulmonary effort is normal  No respiratory distress  Breath sounds: Normal breath sounds  No wheezing or rales  Abdominal:      General: Bowel sounds are normal  There is no distension  Palpations: Abdomen is soft  There is no mass  Tenderness: There is no abdominal tenderness  There is no guarding or rebound  Lymphadenopathy:      Cervical: No cervical adenopathy  Skin:     General: Skin is warm and dry  Neurological:      Mental Status: She is alert and oriented to person, place, and time     Psychiatric: Behavior: Behavior normal          Thought Content:  Thought content normal          Judgment: Judgment normal

## 2021-04-08 RX ORDER — PHENTERMINE HYDROCHLORIDE 37.5 MG/1
37.5 TABLET ORAL DAILY
Qty: 90 TABLET | Refills: 0 | Status: SHIPPED | OUTPATIENT
Start: 2021-04-08 | End: 2021-07-07

## 2021-05-04 DIAGNOSIS — F41.9 ANXIETY: ICD-10-CM

## 2021-05-04 DIAGNOSIS — Z00.00 PHYSICAL EXAM, ANNUAL: ICD-10-CM

## 2021-05-05 RX ORDER — LORAZEPAM 1 MG/1
1 TABLET ORAL AS NEEDED
Qty: 30 TABLET | Refills: 0 | Status: SHIPPED | OUTPATIENT
Start: 2021-05-05 | End: 2022-07-06

## 2021-05-06 ENCOUNTER — TRANSCRIBE ORDERS (OUTPATIENT)
Dept: LAB | Facility: HOSPITAL | Age: 71
End: 2021-05-06

## 2021-05-06 ENCOUNTER — TRANSCRIBE ORDERS (OUTPATIENT)
Dept: ADMINISTRATIVE | Facility: HOSPITAL | Age: 71
End: 2021-05-06

## 2021-05-06 ENCOUNTER — APPOINTMENT (OUTPATIENT)
Dept: LAB | Facility: HOSPITAL | Age: 71
End: 2021-05-06
Payer: MEDICARE

## 2021-05-06 DIAGNOSIS — M81.0 SENILE OSTEOPOROSIS: Primary | ICD-10-CM

## 2021-05-06 DIAGNOSIS — E03.9 MYXEDEMA HEART DISEASE: ICD-10-CM

## 2021-05-06 DIAGNOSIS — E78.00 PURE HYPERCHOLESTEROLEMIA: ICD-10-CM

## 2021-05-06 DIAGNOSIS — G43.011 INTRACTABLE MIGRAINE WITHOUT AURA AND WITH STATUS MIGRAINOSUS: ICD-10-CM

## 2021-05-06 DIAGNOSIS — I51.9 MYXEDEMA HEART DISEASE: ICD-10-CM

## 2021-05-06 DIAGNOSIS — N95.1 SYMPTOMATIC MENOPAUSAL OR FEMALE CLIMACTERIC STATES: ICD-10-CM

## 2021-05-06 DIAGNOSIS — Z12.31 ENCOUNTER FOR SCREENING MAMMOGRAM FOR MALIGNANT NEOPLASM OF BREAST: Primary | ICD-10-CM

## 2021-05-06 DIAGNOSIS — M81.0 SENILE OSTEOPOROSIS: ICD-10-CM

## 2021-05-06 DIAGNOSIS — Z79.890 NEED FOR PROPHYLACTIC HORMONE REPLACEMENT THERAPY (POSTMENOPAUSAL): ICD-10-CM

## 2021-05-06 PROCEDURE — 82679 ASSAY OF ESTRONE: CPT

## 2021-05-06 PROCEDURE — 36415 COLL VENOUS BLD VENIPUNCTURE: CPT

## 2021-05-10 LAB — ESTRONE SERPL-MCNC: 53 PG/ML

## 2021-05-13 ENCOUNTER — OFFICE VISIT (OUTPATIENT)
Dept: FAMILY MEDICINE CLINIC | Facility: CLINIC | Age: 71
End: 2021-05-13
Payer: MEDICARE

## 2021-05-13 VITALS
SYSTOLIC BLOOD PRESSURE: 132 MMHG | DIASTOLIC BLOOD PRESSURE: 74 MMHG | WEIGHT: 141 LBS | OXYGEN SATURATION: 96 % | TEMPERATURE: 99 F | BODY MASS INDEX: 26.62 KG/M2 | HEIGHT: 61 IN | HEART RATE: 93 BPM

## 2021-05-13 DIAGNOSIS — R03.0 ELEVATED BLOOD PRESSURE READING WITHOUT DIAGNOSIS OF HYPERTENSION: Primary | ICD-10-CM

## 2021-05-13 DIAGNOSIS — G43.809 OTHER MIGRAINE WITHOUT STATUS MIGRAINOSUS, NOT INTRACTABLE: ICD-10-CM

## 2021-05-13 PROCEDURE — 99213 OFFICE O/P EST LOW 20 MIN: CPT | Performed by: FAMILY MEDICINE

## 2021-05-13 RX ORDER — BUTALBITAL, ACETAMINOPHEN AND CAFFEINE 50; 325; 40 MG/1; MG/1; MG/1
1 TABLET ORAL EVERY 6 HOURS PRN
Qty: 60 TABLET | Refills: 0 | Status: SHIPPED | OUTPATIENT
Start: 2021-05-13 | End: 2021-08-03 | Stop reason: SDUPTHER

## 2021-05-13 RX ORDER — BUTALBITAL, ACETAMINOPHEN AND CAFFEINE 50; 325; 40 MG/1; MG/1; MG/1
1 TABLET ORAL EVERY 6 HOURS PRN
Qty: 90 TABLET | Refills: 3 | OUTPATIENT
Start: 2021-05-13

## 2021-05-13 NOTE — PROGRESS NOTES
Assessment/Plan:    No problem-specific Assessment & Plan notes found for this encounter  Diagnoses and all orders for this visit:    Elevated blood pressure reading without diagnosis of hypertension    Other migraine without status migrainosus, not intractable  -     butalbital-acetaminophen-caffeine (FIORICET,ESGIC) -40 mg per tablet; Take 1 tablet by mouth every 6 (six) hours as needed for headaches          PHQ-9 Depression Screening    PHQ-9:   Frequency of the following problems over the past two weeks:      Little interest or pleasure in doing things: 1 - several days  Feeling down, depressed, or hopeless: 1 - several days  PHQ-2 Score: 2            Subjective:      Patient ID: Milan Pallas is a 78 y o  female  Follow up for elevated blood pressures, blood pressure is good tody and at home      The following portions of the patient's history were reviewed and updated as appropriate: allergies, current medications, past family history, past medical history, past social history, past surgical history and problem list     Review of Systems   Eyes: Negative for visual disturbance  Cardiovascular: Negative for chest pain and palpitations  Neurological: Negative for headaches  Objective:    /74 (BP Location: Left arm, Patient Position: Sitting, Cuff Size: Standard)   Pulse 93   Temp 99 °F (37 2 °C) (Tympanic)   Ht 5' 1" (1 549 m)   Wt 64 kg (141 lb)   SpO2 96%   BMI 26 64 kg/m²      Physical Exam  Vitals signs and nursing note reviewed  Constitutional:       General: She is not in acute distress  Appearance: Normal appearance  She is not ill-appearing or diaphoretic  HENT:      Head: Normocephalic and atraumatic  Eyes:      Conjunctiva/sclera: Conjunctivae normal    Neck:      Musculoskeletal: Normal range of motion and neck supple  No neck rigidity  Cardiovascular:      Rate and Rhythm: Normal rate and regular rhythm  Heart sounds: Normal heart sounds  No murmur  Pulmonary:      Effort: Pulmonary effort is normal  No respiratory distress  Breath sounds: Normal breath sounds  No wheezing, rhonchi or rales  Abdominal:      General: There is no distension  Palpations: Abdomen is soft  There is no mass  Tenderness: There is no abdominal tenderness  There is no guarding or rebound  Musculoskeletal:      Right lower leg: No edema  Left lower leg: No edema  Lymphadenopathy:      Cervical: No cervical adenopathy  Neurological:      Mental Status: She is alert and oriented to person, place, and time  Psychiatric:         Mood and Affect: Mood normal          Behavior: Behavior normal          Thought Content:  Thought content normal          Judgment: Judgment normal

## 2021-07-06 DIAGNOSIS — R63.4 WEIGHT LOSS: ICD-10-CM

## 2021-07-07 RX ORDER — PHENTERMINE HYDROCHLORIDE 37.5 MG/1
37.5 TABLET ORAL DAILY
Qty: 90 TABLET | Refills: 0 | Status: SHIPPED | OUTPATIENT
Start: 2021-07-07 | End: 2021-10-08

## 2021-08-03 DIAGNOSIS — G43.809 OTHER MIGRAINE WITHOUT STATUS MIGRAINOSUS, NOT INTRACTABLE: ICD-10-CM

## 2021-08-03 RX ORDER — BUTALBITAL, ACETAMINOPHEN AND CAFFEINE 50; 325; 40 MG/1; MG/1; MG/1
1 TABLET ORAL EVERY 6 HOURS PRN
Qty: 60 TABLET | Refills: 0 | Status: SHIPPED | OUTPATIENT
Start: 2021-08-03 | End: 2022-01-04 | Stop reason: SDUPTHER

## 2021-08-12 ENCOUNTER — OFFICE VISIT (OUTPATIENT)
Dept: FAMILY MEDICINE CLINIC | Facility: CLINIC | Age: 71
End: 2021-08-12
Payer: MEDICARE

## 2021-08-12 VITALS
SYSTOLIC BLOOD PRESSURE: 139 MMHG | BODY MASS INDEX: 27.19 KG/M2 | WEIGHT: 144 LBS | TEMPERATURE: 98.5 F | OXYGEN SATURATION: 99 % | HEART RATE: 107 BPM | DIASTOLIC BLOOD PRESSURE: 88 MMHG | HEIGHT: 61 IN

## 2021-08-12 DIAGNOSIS — R42 DIZZINESS: Primary | ICD-10-CM

## 2021-08-12 PROCEDURE — 99213 OFFICE O/P EST LOW 20 MIN: CPT | Performed by: FAMILY MEDICINE

## 2021-08-12 RX ORDER — METHYLPREDNISOLONE 4 MG/1
TABLET ORAL
Qty: 21 EACH | Refills: 0 | Status: SHIPPED | OUTPATIENT
Start: 2021-08-12 | End: 2021-11-18 | Stop reason: ALTCHOICE

## 2021-08-12 RX ORDER — VALACYCLOVIR HYDROCHLORIDE 1 G/1
1000 TABLET, FILM COATED ORAL 2 TIMES DAILY
Qty: 14 TABLET | Refills: 0 | Status: SHIPPED | OUTPATIENT
Start: 2021-08-12 | End: 2022-07-13

## 2021-08-12 NOTE — PROGRESS NOTES
Assessment/Plan:    No problem-specific Assessment & Plan notes found for this encounter  Diagnoses and all orders for this visit:    Dizziness  -     methylPREDNISolone 4 MG tablet therapy pack; Use as directed on package  -     valACYclovir (VALTREX) 1,000 mg tablet; Take 1 tablet (1,000 mg total) by mouth 2 (two) times a day for 7 days          PHQ-9 Depression Screening    PHQ-9:   Frequency of the following problems over the past two weeks:                Subjective:      Patient ID: Ghassan Abarca is a 78 y o  female  Dizziness  This is a new problem  The current episode started 1 to 4 weeks ago  The problem occurs intermittently  The problem has been unchanged  Associated symptoms include fatigue  Pertinent negatives include no chills, fever, headaches, numbness or weakness  She has tried nothing for the symptoms  The treatment provided no relief  The following portions of the patient's history were reviewed and updated as appropriate: allergies, current medications, past family history, past medical history, past social history, past surgical history and problem list     Review of Systems   Constitutional: Positive for fatigue  Negative for chills and fever  Eyes: Negative for visual disturbance  Neurological: Positive for dizziness  Negative for speech difficulty, weakness, numbness and headaches  Objective:    /88   Pulse (!) 107   Temp 98 5 °F (36 9 °C) (Tympanic)   Ht 5' 1" (1 549 m)   Wt 65 3 kg (144 lb)   SpO2 99%   BMI 27 21 kg/m²      Physical Exam  Vitals and nursing note reviewed  Constitutional:       General: She is not in acute distress  Appearance: Normal appearance  She is not ill-appearing or diaphoretic  HENT:      Head: Normocephalic and atraumatic  Right Ear: Tympanic membrane, ear canal and external ear normal  There is no impacted cerumen        Left Ear: Tympanic membrane, ear canal and external ear normal  There is no impacted cerumen  Nose: Nose normal  No congestion or rhinorrhea  Mouth/Throat:      Mouth: Mucous membranes are moist       Pharynx: No oropharyngeal exudate or posterior oropharyngeal erythema  Eyes:      Conjunctiva/sclera: Conjunctivae normal       Pupils: Pupils are equal, round, and reactive to light  Cardiovascular:      Rate and Rhythm: Normal rate and regular rhythm  Heart sounds: Normal heart sounds  No murmur heard  Pulmonary:      Effort: Pulmonary effort is normal  No respiratory distress  Breath sounds: Normal breath sounds  No wheezing, rhonchi or rales  Musculoskeletal:      Cervical back: Normal range of motion and neck supple  No rigidity  Right lower leg: No edema  Left lower leg: No edema  Lymphadenopathy:      Cervical: No cervical adenopathy  Neurological:      Mental Status: She is alert and oriented to person, place, and time  Psychiatric:         Mood and Affect: Mood normal          Behavior: Behavior normal          Thought Content:  Thought content normal          Judgment: Judgment normal

## 2021-08-26 ENCOUNTER — OFFICE VISIT (OUTPATIENT)
Dept: FAMILY MEDICINE CLINIC | Facility: CLINIC | Age: 71
End: 2021-08-26
Payer: MEDICARE

## 2021-08-26 VITALS
BODY MASS INDEX: 27.08 KG/M2 | HEIGHT: 61 IN | OXYGEN SATURATION: 96 % | HEART RATE: 104 BPM | DIASTOLIC BLOOD PRESSURE: 74 MMHG | SYSTOLIC BLOOD PRESSURE: 139 MMHG | WEIGHT: 143.4 LBS | TEMPERATURE: 97.2 F

## 2021-08-26 DIAGNOSIS — R42 DIZZINESS: Primary | ICD-10-CM

## 2021-08-26 DIAGNOSIS — Z23 ENCOUNTER FOR IMMUNIZATION: ICD-10-CM

## 2021-08-26 DIAGNOSIS — F32.A DEPRESSION, UNSPECIFIED DEPRESSION TYPE: ICD-10-CM

## 2021-08-26 PROCEDURE — 99213 OFFICE O/P EST LOW 20 MIN: CPT | Performed by: FAMILY MEDICINE

## 2021-08-26 PROCEDURE — G0009 ADMIN PNEUMOCOCCAL VACCINE: HCPCS | Performed by: FAMILY MEDICINE

## 2021-08-26 PROCEDURE — 90732 PPSV23 VACC 2 YRS+ SUBQ/IM: CPT | Performed by: FAMILY MEDICINE

## 2021-08-26 NOTE — PROGRESS NOTES
Assessment/Plan:    No problem-specific Assessment & Plan notes found for this encounter  Diagnoses and all orders for this visit:    Dizziness  Comments:  resolving    Encounter for immunization  -     PNEUMOCOCCAL POLYSACCHARIDE VACCINE 23-VALENT =>1YO SQ IM    Depression, unspecified depression type  -     sertraline (ZOLOFT) 50 mg tablet; Take 1 tablet (50 mg total) by mouth daily          PHQ-9 Depression Screening    PHQ-9:   Frequency of the following problems over the past two weeks:      Little interest or pleasure in doing things: 0 - not at all  Feeling down, depressed, or hopeless: 0 - not at all  PHQ-2 Score: 0            Subjective:      Patient ID: Fern Parker is a 78 y o  female  Dizziness is improving with fatiguing exercises    Dizziness  This is a new problem  The current episode started more than 1 month ago  The problem occurs intermittently  The problem has been rapidly improving  Pertinent negatives include no chills, fever, headaches or weakness  The following portions of the patient's history were reviewed and updated as appropriate: allergies, current medications, past family history, past medical history, past social history, past surgical history and problem list     Review of Systems   Constitutional: Negative for chills and fever  Respiratory: Negative for shortness of breath  Cardiovascular: Negative for palpitations  Neurological: Positive for dizziness  Negative for seizures, speech difficulty, weakness, light-headedness and headaches  Objective:    /74   Pulse 104   Temp (!) 97 2 °F (36 2 °C) (Tympanic)   Ht 5' 1" (1 549 m)   Wt 65 kg (143 lb 6 4 oz)   SpO2 96%   BMI 27 10 kg/m²      Physical Exam  Vitals and nursing note reviewed  Constitutional:       General: She is not in acute distress  Appearance: Normal appearance  She is not ill-appearing or diaphoretic  HENT:      Head: Normocephalic and atraumatic     Eyes: Conjunctiva/sclera: Conjunctivae normal    Cardiovascular:      Rate and Rhythm: Normal rate and regular rhythm  Heart sounds: Normal heart sounds  No murmur heard  Pulmonary:      Effort: Pulmonary effort is normal  No respiratory distress  Breath sounds: Normal breath sounds  No wheezing, rhonchi or rales  Abdominal:      General: There is no distension  Palpations: Abdomen is soft  There is no mass  Tenderness: There is no abdominal tenderness  There is no guarding or rebound  Musculoskeletal:      Cervical back: Normal range of motion and neck supple  No rigidity  Right lower leg: No edema  Left lower leg: No edema  Lymphadenopathy:      Cervical: No cervical adenopathy  Neurological:      Mental Status: She is alert and oriented to person, place, and time  Psychiatric:         Mood and Affect: Mood normal          Behavior: Behavior normal          Thought Content:  Thought content normal          Judgment: Judgment normal

## 2021-10-08 DIAGNOSIS — R63.4 WEIGHT LOSS: ICD-10-CM

## 2021-10-08 RX ORDER — PHENTERMINE HYDROCHLORIDE 37.5 MG/1
37.5 TABLET ORAL DAILY
Qty: 90 TABLET | Refills: 0 | Status: SHIPPED | OUTPATIENT
Start: 2021-10-08 | End: 2022-01-05

## 2021-11-05 ENCOUNTER — APPOINTMENT (OUTPATIENT)
Dept: LAB | Facility: HOSPITAL | Age: 71
End: 2021-11-05
Payer: MEDICARE

## 2021-11-05 DIAGNOSIS — E78.49 OTHER HYPERLIPIDEMIA: ICD-10-CM

## 2021-11-05 DIAGNOSIS — I51.9 MYXEDEMA HEART DISEASE: Primary | ICD-10-CM

## 2021-11-05 DIAGNOSIS — Z79.890 NEED FOR PROPHYLACTIC HORMONE REPLACEMENT THERAPY (POSTMENOPAUSAL): ICD-10-CM

## 2021-11-05 DIAGNOSIS — M81.0 SENILE OSTEOPOROSIS: ICD-10-CM

## 2021-11-05 DIAGNOSIS — N95.1 SYMPTOMATIC MENOPAUSAL OR FEMALE CLIMACTERIC STATES: ICD-10-CM

## 2021-11-05 DIAGNOSIS — G43.001 MIGRAINE WITHOUT AURA AND WITH STATUS MIGRAINOSUS, NOT INTRACTABLE: ICD-10-CM

## 2021-11-05 DIAGNOSIS — E03.9 MYXEDEMA HEART DISEASE: Primary | ICD-10-CM

## 2021-11-05 LAB
25(OH)D3 SERPL-MCNC: 29.8 NG/ML (ref 30–100)
ALBUMIN SERPL BCP-MCNC: 4.2 G/DL (ref 3.5–5.7)
ALP SERPL-CCNC: 55 U/L (ref 55–165)
ALT SERPL W P-5'-P-CCNC: 20 U/L (ref 7–52)
ANION GAP SERPL CALCULATED.3IONS-SCNC: 9 MMOL/L (ref 4–13)
AST SERPL W P-5'-P-CCNC: 25 U/L (ref 13–39)
BASOPHILS # BLD AUTO: 0 THOUSANDS/ΜL (ref 0–0.1)
BASOPHILS NFR BLD AUTO: 1 % (ref 0–2)
BILIRUB SERPL-MCNC: 0.5 MG/DL (ref 0.2–1)
BUN SERPL-MCNC: 10 MG/DL (ref 7–25)
CALCIUM SERPL-MCNC: 9.8 MG/DL (ref 8.6–10.5)
CHLORIDE SERPL-SCNC: 106 MMOL/L (ref 98–107)
CO2 SERPL-SCNC: 24 MMOL/L (ref 21–31)
CREAT SERPL-MCNC: 0.65 MG/DL (ref 0.6–1.2)
EOSINOPHIL # BLD AUTO: 0.2 THOUSAND/ΜL (ref 0–0.61)
EOSINOPHIL NFR BLD AUTO: 4 % (ref 0–5)
ERYTHROCYTE [DISTWIDTH] IN BLOOD BY AUTOMATED COUNT: 14 % (ref 11.5–14.5)
EST. AVERAGE GLUCOSE BLD GHB EST-MCNC: 105 MG/DL
ESTRADIOL SERPL-MCNC: 15 PG/ML
GFR SERPL CREATININE-BSD FRML MDRD: 92 ML/MIN/1.73SQ M
GLUCOSE P FAST SERPL-MCNC: 126 MG/DL (ref 65–99)
HBA1C MFR BLD: 5.3 %
HCT VFR BLD AUTO: 41.6 % (ref 42–47)
HGB BLD-MCNC: 13.7 G/DL (ref 12–16)
INSULIN SERPL-ACNC: 14.7 MU/L (ref 3–25)
LYMPHOCYTES # BLD AUTO: 1.4 THOUSANDS/ΜL (ref 0.6–4.47)
LYMPHOCYTES NFR BLD AUTO: 24 % (ref 21–51)
MCH RBC QN AUTO: 30.4 PG (ref 26–34)
MCHC RBC AUTO-ENTMCNC: 33 G/DL (ref 31–37)
MCV RBC AUTO: 92 FL (ref 81–99)
MONOCYTES # BLD AUTO: 0.3 THOUSAND/ΜL (ref 0.17–1.22)
MONOCYTES NFR BLD AUTO: 6 % (ref 2–12)
NEUTROPHILS # BLD AUTO: 4 THOUSANDS/ΜL (ref 1.4–6.5)
NEUTS SEG NFR BLD AUTO: 66 % (ref 42–75)
PLATELET # BLD AUTO: 300 THOUSANDS/UL (ref 149–390)
PMV BLD AUTO: 9.3 FL (ref 8.6–11.7)
POTASSIUM SERPL-SCNC: 3.6 MMOL/L (ref 3.5–5.5)
PROGEST SERPL-MCNC: 0.4 NG/ML
PROT SERPL-MCNC: 6.7 G/DL (ref 6.4–8.9)
RBC # BLD AUTO: 4.52 MILLION/UL (ref 3.9–5.2)
SODIUM SERPL-SCNC: 139 MMOL/L (ref 134–143)
T3FREE SERPL-MCNC: 3.02 PG/ML (ref 2.3–4.2)
T4 FREE SERPL-MCNC: 1.12 NG/DL (ref 0.76–1.46)
T4 SERPL-MCNC: 7.8 UG/DL (ref 4.7–13.3)
TSH SERPL DL<=0.05 MIU/L-ACNC: 2.23 UIU/ML (ref 0.45–5.33)
WBC # BLD AUTO: 6 THOUSAND/UL (ref 4.8–10.8)

## 2021-11-05 PROCEDURE — 84439 ASSAY OF FREE THYROXINE: CPT

## 2021-11-05 PROCEDURE — 83525 ASSAY OF INSULIN: CPT

## 2021-11-05 PROCEDURE — 83036 HEMOGLOBIN GLYCOSYLATED A1C: CPT

## 2021-11-05 PROCEDURE — 80053 COMPREHEN METABOLIC PANEL: CPT

## 2021-11-05 PROCEDURE — 82679 ASSAY OF ESTRONE: CPT

## 2021-11-05 PROCEDURE — 82670 ASSAY OF TOTAL ESTRADIOL: CPT

## 2021-11-05 PROCEDURE — 85025 COMPLETE CBC W/AUTO DIFF WBC: CPT

## 2021-11-05 PROCEDURE — 84403 ASSAY OF TOTAL TESTOSTERONE: CPT

## 2021-11-05 PROCEDURE — 82306 VITAMIN D 25 HYDROXY: CPT

## 2021-11-05 PROCEDURE — 84402 ASSAY OF FREE TESTOSTERONE: CPT

## 2021-11-05 PROCEDURE — 84443 ASSAY THYROID STIM HORMONE: CPT

## 2021-11-05 PROCEDURE — 84144 ASSAY OF PROGESTERONE: CPT

## 2021-11-05 PROCEDURE — 36415 COLL VENOUS BLD VENIPUNCTURE: CPT

## 2021-11-05 PROCEDURE — 82627 DEHYDROEPIANDROSTERONE: CPT

## 2021-11-05 PROCEDURE — 84481 FREE ASSAY (FT-3): CPT

## 2021-11-06 LAB
DHEA-S SERPL-MCNC: 10.7 UG/DL (ref 20.4–186.6)
TESTOST FREE SERPL-MCNC: 0.6 PG/ML (ref 0–4.2)
TESTOST SERPL-MCNC: <3 NG/DL (ref 3–67)

## 2021-11-08 LAB — ESTRONE SERPL-MCNC: 75 PG/ML

## 2021-11-15 ENCOUNTER — HOSPITAL ENCOUNTER (OUTPATIENT)
Dept: MAMMOGRAPHY | Facility: HOSPITAL | Age: 71
Discharge: HOME | End: 2021-11-15
Payer: MEDICARE

## 2021-11-15 VITALS — HEIGHT: 61 IN | BODY MASS INDEX: 26.06 KG/M2 | WEIGHT: 138 LBS

## 2021-11-15 DIAGNOSIS — M81.8 OTHER OSTEOPOROSIS WITHOUT CURRENT PATHOLOGICAL FRACTURE: ICD-10-CM

## 2021-11-15 DIAGNOSIS — Z12.31 ENCOUNTER FOR SCREENING MAMMOGRAM FOR MALIGNANT NEOPLASM OF BREAST: ICD-10-CM

## 2021-11-15 PROCEDURE — 77067 SCR MAMMO BI INCL CAD: CPT

## 2021-11-15 PROCEDURE — 77063 BREAST TOMOSYNTHESIS BI: CPT

## 2021-11-18 ENCOUNTER — OFFICE VISIT (OUTPATIENT)
Dept: FAMILY MEDICINE CLINIC | Facility: CLINIC | Age: 71
End: 2021-11-18
Payer: MEDICARE

## 2021-11-18 VITALS
SYSTOLIC BLOOD PRESSURE: 152 MMHG | DIASTOLIC BLOOD PRESSURE: 82 MMHG | TEMPERATURE: 98.7 F | OXYGEN SATURATION: 99 % | HEART RATE: 105 BPM | BODY MASS INDEX: 26.66 KG/M2 | HEIGHT: 61 IN | WEIGHT: 141.2 LBS

## 2021-11-18 DIAGNOSIS — Z23 ENCOUNTER FOR IMMUNIZATION: ICD-10-CM

## 2021-11-18 DIAGNOSIS — E78.00 HYPERCHOLESTEROLEMIA: ICD-10-CM

## 2021-11-18 DIAGNOSIS — F32.A DEPRESSION, UNSPECIFIED DEPRESSION TYPE: Primary | ICD-10-CM

## 2021-11-18 PROCEDURE — G0008 ADMIN INFLUENZA VIRUS VAC: HCPCS | Performed by: FAMILY MEDICINE

## 2021-11-18 PROCEDURE — 99213 OFFICE O/P EST LOW 20 MIN: CPT | Performed by: FAMILY MEDICINE

## 2021-11-18 PROCEDURE — 90662 IIV NO PRSV INCREASED AG IM: CPT | Performed by: FAMILY MEDICINE

## 2021-11-18 RX ORDER — SERTRALINE HYDROCHLORIDE 100 MG/1
100 TABLET, FILM COATED ORAL DAILY
Qty: 30 TABLET | Refills: 6 | Status: SHIPPED | OUTPATIENT
Start: 2021-11-18 | End: 2022-01-04 | Stop reason: SDUPTHER

## 2021-11-18 NOTE — PROGRESS NOTES
Assessment/Plan:    No problem-specific Assessment & Plan notes found for this encounter  Diagnoses and all orders for this visit:    Depression, unspecified depression type  -     sertraline (ZOLOFT) 100 mg tablet; Take 1 tablet (100 mg total) by mouth daily    Encounter for immunization  -     influenza vaccine, high-dose, PF 0 7 mL (FLUZONE HIGH-DOSE)    Hypercholesterolemia          PHQ-9 Depression Screening    PHQ-9:   Frequency of the following problems over the past two weeks:      Little interest or pleasure in doing things: 0 - not at all  Feeling down, depressed, or hopeless: 0 - not at all  PHQ-2 Score: 0            Subjective:      Patient ID: Della Truong is a 78 y o  female  Depression  This is a chronic problem  The current episode started more than 1 year ago  The problem has been gradually improving  Treatments tried: zoloft  The treatment provided mild relief  The following portions of the patient's history were reviewed and updated as appropriate: allergies, current medications, past family history, past medical history, past social history, past surgical history and problem list     Review of Systems   Respiratory: Negative for shortness of breath  Cardiovascular: Negative for palpitations  Psychiatric/Behavioral: Positive for depression  Negative for suicidal ideas  Objective:    /82 (BP Location: Left arm, Patient Position: Sitting)   Pulse 105   Temp 98 7 °F (37 1 °C) (Tympanic)   Ht 5' 1" (1 549 m)   Wt 64 kg (141 lb 3 2 oz)   SpO2 99%   BMI 26 68 kg/m²      Physical Exam  Vitals and nursing note reviewed  Constitutional:       General: She is not in acute distress  Appearance: Normal appearance  She is not ill-appearing or diaphoretic  HENT:      Head: Normocephalic and atraumatic  Eyes:      Conjunctiva/sclera: Conjunctivae normal    Cardiovascular:      Rate and Rhythm: Normal rate and regular rhythm        Heart sounds: Normal heart sounds  No murmur heard  Pulmonary:      Effort: Pulmonary effort is normal  No respiratory distress  Breath sounds: Normal breath sounds  No wheezing, rhonchi or rales  Abdominal:      General: There is no distension  Palpations: Abdomen is soft  There is no mass  Tenderness: There is no abdominal tenderness  There is no guarding or rebound  Musculoskeletal:      Cervical back: Normal range of motion and neck supple  No rigidity  Right lower leg: No edema  Left lower leg: No edema  Lymphadenopathy:      Cervical: No cervical adenopathy  Neurological:      Mental Status: She is alert and oriented to person, place, and time  Psychiatric:         Mood and Affect: Mood normal          Behavior: Behavior normal          Thought Content:  Thought content normal          Judgment: Judgment normal

## 2021-11-22 RX ORDER — IBANDRONATE SODIUM 150 MG/1
150 TABLET, FILM COATED ORAL
Qty: 1 TABLET | Refills: 6 | Status: SHIPPED | OUTPATIENT
Start: 2021-11-22

## 2021-12-16 ENCOUNTER — ANNUAL EXAM (OUTPATIENT)
Dept: OBGYN CLINIC | Facility: MEDICAL CENTER | Age: 71
End: 2021-12-16
Payer: MEDICARE

## 2021-12-16 VITALS
HEIGHT: 61 IN | BODY MASS INDEX: 26.06 KG/M2 | DIASTOLIC BLOOD PRESSURE: 78 MMHG | WEIGHT: 138 LBS | SYSTOLIC BLOOD PRESSURE: 110 MMHG

## 2021-12-16 DIAGNOSIS — Z01.419 ENCOUNTER FOR GYNECOLOGICAL EXAMINATION (GENERAL) (ROUTINE) WITHOUT ABNORMAL FINDINGS: Primary | ICD-10-CM

## 2021-12-16 DIAGNOSIS — Z12.31 ENCOUNTER FOR SCREENING MAMMOGRAM FOR MALIGNANT NEOPLASM OF BREAST: ICD-10-CM

## 2021-12-16 PROCEDURE — G0101 CA SCREEN;PELVIC/BREAST EXAM: HCPCS | Performed by: OBSTETRICS & GYNECOLOGY

## 2021-12-30 DIAGNOSIS — R63.4 WEIGHT LOSS: ICD-10-CM

## 2022-01-04 DIAGNOSIS — G43.809 OTHER MIGRAINE WITHOUT STATUS MIGRAINOSUS, NOT INTRACTABLE: ICD-10-CM

## 2022-01-04 DIAGNOSIS — F32.A DEPRESSION, UNSPECIFIED DEPRESSION TYPE: ICD-10-CM

## 2022-01-04 RX ORDER — SERTRALINE HYDROCHLORIDE 100 MG/1
100 TABLET, FILM COATED ORAL DAILY
Qty: 30 TABLET | Refills: 6 | Status: SHIPPED | OUTPATIENT
Start: 2022-01-04

## 2022-01-05 RX ORDER — PHENTERMINE HYDROCHLORIDE 37.5 MG/1
37.5 TABLET ORAL DAILY
Qty: 90 TABLET | Refills: 0 | Status: SHIPPED | OUTPATIENT
Start: 2022-01-05 | End: 2022-04-04

## 2022-01-05 RX ORDER — BUTALBITAL, ACETAMINOPHEN AND CAFFEINE 50; 325; 40 MG/1; MG/1; MG/1
1 TABLET ORAL EVERY 6 HOURS PRN
Qty: 60 TABLET | Refills: 0 | Status: SHIPPED | OUTPATIENT
Start: 2022-01-05 | End: 2022-03-24

## 2022-01-28 ENCOUNTER — OFFICE VISIT (OUTPATIENT)
Dept: FAMILY MEDICINE CLINIC | Facility: CLINIC | Age: 72
End: 2022-01-28
Payer: MEDICARE

## 2022-01-28 VITALS
OXYGEN SATURATION: 99 % | DIASTOLIC BLOOD PRESSURE: 86 MMHG | HEIGHT: 61 IN | WEIGHT: 140 LBS | TEMPERATURE: 97.5 F | HEART RATE: 115 BPM | BODY MASS INDEX: 26.43 KG/M2 | SYSTOLIC BLOOD PRESSURE: 139 MMHG

## 2022-01-28 DIAGNOSIS — R03.0 ELEVATED BLOOD PRESSURE READING WITHOUT DIAGNOSIS OF HYPERTENSION: ICD-10-CM

## 2022-01-28 DIAGNOSIS — E78.00 HYPERCHOLESTEROLEMIA: ICD-10-CM

## 2022-01-28 DIAGNOSIS — E03.9 ACQUIRED HYPOTHYROIDISM: ICD-10-CM

## 2022-01-28 DIAGNOSIS — F32.A DEPRESSION, UNSPECIFIED DEPRESSION TYPE: Primary | ICD-10-CM

## 2022-01-28 PROCEDURE — 99214 OFFICE O/P EST MOD 30 MIN: CPT | Performed by: FAMILY MEDICINE

## 2022-01-28 NOTE — PROGRESS NOTES
Assessment/Plan:    No problem-specific Assessment & Plan notes found for this encounter  Diagnoses and all orders for this visit:    Depression, unspecified depression type    Hypercholesterolemia  -     Comprehensive metabolic panel; Future  -     Lipid panel; Future    Acquired hypothyroidism  -     TSH, 3rd generation with Free T4 reflex; Future    Elevated blood pressure reading without diagnosis of hypertension  -     CBC and differential; Future          PHQ-2/9 Depression Screening    Little interest or pleasure in doing things: 0 - not at all  Feeling down, depressed, or hopeless: 0 - not at all  PHQ-2 Score: 0  PHQ-2 Interpretation: Negative depression screen            Subjective:      Patient ID: Milan Pallas is a 73 y o  female  Depression  This is a chronic problem  The current episode started more than 1 month ago  The problem occurs intermittently  The problem has been gradually improving  Treatments tried: zoloft  The treatment provided significant relief  The following portions of the patient's history were reviewed and updated as appropriate: allergies, current medications, past family history, past medical history, past social history, past surgical history and problem list     Review of Systems   Respiratory: Negative for shortness of breath  Cardiovascular: Negative for palpitations  Psychiatric/Behavioral: Positive for depression  Negative for suicidal ideas  Objective:    /86   Pulse (!) 115   Temp 97 5 °F (36 4 °C)   Ht 5' 1" (1 549 m)   Wt 63 5 kg (140 lb)   SpO2 99%   BMI 26 45 kg/m²      Physical Exam  Vitals and nursing note reviewed  Constitutional:       General: She is not in acute distress  Appearance: Normal appearance  She is not ill-appearing or diaphoretic  HENT:      Head: Normocephalic and atraumatic  Eyes:      Conjunctiva/sclera: Conjunctivae normal    Cardiovascular:      Rate and Rhythm: Normal rate and regular rhythm  Heart sounds: Normal heart sounds  No murmur heard  Pulmonary:      Effort: Pulmonary effort is normal  No respiratory distress  Breath sounds: Normal breath sounds  No wheezing, rhonchi or rales  Abdominal:      General: There is no distension  Palpations: Abdomen is soft  There is no mass  Tenderness: There is no abdominal tenderness  There is no guarding or rebound  Musculoskeletal:      Cervical back: Normal range of motion and neck supple  No rigidity  Right lower leg: No edema  Left lower leg: No edema  Lymphadenopathy:      Cervical: No cervical adenopathy  Neurological:      Mental Status: She is alert and oriented to person, place, and time  Psychiatric:         Mood and Affect: Mood normal          Behavior: Behavior normal          Thought Content:  Thought content normal          Judgment: Judgment normal

## 2022-03-22 DIAGNOSIS — G43.809 OTHER MIGRAINE WITHOUT STATUS MIGRAINOSUS, NOT INTRACTABLE: ICD-10-CM

## 2022-03-24 DIAGNOSIS — G43.809 OTHER MIGRAINE WITHOUT STATUS MIGRAINOSUS, NOT INTRACTABLE: ICD-10-CM

## 2022-03-24 RX ORDER — BUTALBITAL, ACETAMINOPHEN AND CAFFEINE 50; 325; 40 MG/1; MG/1; MG/1
1 TABLET ORAL EVERY 6 HOURS PRN
Qty: 60 TABLET | Refills: 0 | Status: SHIPPED | OUTPATIENT
Start: 2022-03-24 | End: 2022-06-08 | Stop reason: SDUPTHER

## 2022-03-24 RX ORDER — BUTALBITAL, ACETAMINOPHEN AND CAFFEINE 50; 325; 40 MG/1; MG/1; MG/1
1 TABLET ORAL EVERY 6 HOURS PRN
Qty: 60 TABLET | Refills: 0 | OUTPATIENT
Start: 2022-03-24

## 2022-03-28 ENCOUNTER — PREPPED CHART (OUTPATIENT)
Dept: URBAN - METROPOLITAN AREA CLINIC 6 | Facility: CLINIC | Age: 72
End: 2022-03-28

## 2022-04-04 DIAGNOSIS — E78.00 HYPERCHOLESTEROLEMIA: ICD-10-CM

## 2022-04-04 DIAGNOSIS — E78.1 HYPERTRIGLYCERIDEMIA: ICD-10-CM

## 2022-04-04 DIAGNOSIS — E03.9 ACQUIRED HYPOTHYROIDISM: ICD-10-CM

## 2022-04-04 DIAGNOSIS — R63.4 WEIGHT LOSS: ICD-10-CM

## 2022-04-04 RX ORDER — FENOFIBRATE 130 MG/1
130 CAPSULE ORAL
Qty: 90 CAPSULE | Refills: 1 | Status: SHIPPED | OUTPATIENT
Start: 2022-04-04

## 2022-04-04 RX ORDER — EZETIMIBE AND SIMVASTATIN 10; 40 MG/1; MG/1
1 TABLET ORAL
Qty: 90 TABLET | Refills: 1 | Status: SHIPPED | OUTPATIENT
Start: 2022-04-04

## 2022-04-04 RX ORDER — LEVOTHYROXINE SODIUM 0.05 MG/1
50 TABLET ORAL DAILY
Qty: 90 TABLET | Refills: 1 | Status: SHIPPED | OUTPATIENT
Start: 2022-04-04

## 2022-04-04 RX ORDER — PHENTERMINE HYDROCHLORIDE 37.5 MG/1
37.5 TABLET ORAL DAILY
Qty: 90 TABLET | Refills: 0 | Status: SHIPPED | OUTPATIENT
Start: 2022-04-04 | End: 2022-06-22

## 2022-04-05 DIAGNOSIS — G43.809 OTHER MIGRAINE WITHOUT STATUS MIGRAINOSUS, NOT INTRACTABLE: ICD-10-CM

## 2022-04-05 RX ORDER — BUTALBITAL, ACETAMINOPHEN AND CAFFEINE 50; 325; 40 MG/1; MG/1; MG/1
1 TABLET ORAL EVERY 6 HOURS PRN
Qty: 60 TABLET | Refills: 0 | OUTPATIENT
Start: 2022-04-05

## 2022-05-25 ENCOUNTER — ESTABLISHED COMPREHENSIVE EXAM (OUTPATIENT)
Dept: URBAN - METROPOLITAN AREA CLINIC 6 | Facility: CLINIC | Age: 72
End: 2022-05-25

## 2022-05-25 DIAGNOSIS — Z96.1: ICD-10-CM

## 2022-05-25 DIAGNOSIS — H50.52: ICD-10-CM

## 2022-05-25 PROCEDURE — 92014 COMPRE OPH EXAM EST PT 1/>: CPT

## 2022-05-25 PROCEDURE — 92015 DETERMINE REFRACTIVE STATE: CPT

## 2022-05-25 ASSESSMENT — VISUAL ACUITY
OD_CC: 20/25
OU_CC: J1
OS_CC: 20/50

## 2022-05-25 ASSESSMENT — TONOMETRY
OD_IOP_MMHG: 11
OS_IOP_MMHG: 13

## 2022-05-26 ENCOUNTER — RA CDI HCC (OUTPATIENT)
Dept: OTHER | Facility: HOSPITAL | Age: 72
End: 2022-05-26

## 2022-05-26 NOTE — PROGRESS NOTES
Can the depression be further specified as:     F33 0 major depressive disorder, recurrent, mild  OR   F33 1 major depressive disorder, recurrent, moderate  OR   F33 2 major depressive disorder, recurrent, severe without psychotic features OR   F33 41 major depressive disorder, recurrent, in partial remission OR   F33 42 major depressive disorder, recurrent, in full remission  [facility] 75  coding opportunities          Chart Reviewed number of suggestions sent to Provider: 1     Patients Insurance     Medicare Insurance: Medicare

## 2022-06-02 ENCOUNTER — OFFICE VISIT (OUTPATIENT)
Dept: FAMILY MEDICINE CLINIC | Facility: CLINIC | Age: 72
End: 2022-06-02
Payer: MEDICARE

## 2022-06-02 VITALS
BODY MASS INDEX: 24.78 KG/M2 | TEMPERATURE: 97.4 F | SYSTOLIC BLOOD PRESSURE: 139 MMHG | OXYGEN SATURATION: 100 % | DIASTOLIC BLOOD PRESSURE: 88 MMHG | HEIGHT: 61 IN | HEART RATE: 114 BPM | WEIGHT: 131.25 LBS

## 2022-06-02 DIAGNOSIS — E78.00 HYPERCHOLESTEROLEMIA: ICD-10-CM

## 2022-06-02 DIAGNOSIS — E03.9 ACQUIRED HYPOTHYROIDISM: ICD-10-CM

## 2022-06-02 DIAGNOSIS — R00.0 TACHYCARDIA: ICD-10-CM

## 2022-06-02 DIAGNOSIS — Z00.00 MEDICARE ANNUAL WELLNESS VISIT, SUBSEQUENT: Primary | ICD-10-CM

## 2022-06-02 PROCEDURE — 1123F ACP DISCUSS/DSCN MKR DOCD: CPT | Performed by: FAMILY MEDICINE

## 2022-06-02 PROCEDURE — 99213 OFFICE O/P EST LOW 20 MIN: CPT | Performed by: FAMILY MEDICINE

## 2022-06-02 PROCEDURE — G0439 PPPS, SUBSEQ VISIT: HCPCS | Performed by: FAMILY MEDICINE

## 2022-06-02 NOTE — PATIENT INSTRUCTIONS
Medicare Preventive Visit Patient Instructions  Thank you for completing your Welcome to Medicare Visit or Medicare Annual Wellness Visit today  Your next wellness visit will be due in one year (6/3/2023)  The screening/preventive services that you may require over the next 5-10 years are detailed below  Some tests may not apply to you based off risk factors and/or age  Screening tests ordered at today's visit but not completed yet may show as past due  Also, please note that scanned in results may not display below  Preventive Screenings:  Service Recommendations Previous Testing/Comments   Colorectal Cancer Screening  * Colonoscopy    * Fecal Occult Blood Test (FOBT)/Fecal Immunochemical Test (FIT)  * Fecal DNA/Cologuard Test  * Flexible Sigmoidoscopy Age: 54-65 years old   Colonoscopy: every 10 years (may be performed more frequently if at higher risk)  OR  FOBT/FIT: every 1 year  OR  Cologuard: every 3 years  OR  Sigmoidoscopy: every 5 years  Screening may be recommended earlier than age 48 if at higher risk for colorectal cancer  Also, an individualized decision between you and your healthcare provider will decide whether screening between the ages of 74-80 would be appropriate  Colonoscopy: 05/20/2021  FOBT/FIT: Not on file  Cologuard: Not on file  Sigmoidoscopy: Not on file    Screening Current     Breast Cancer Screening Age: 36 years old  Frequency: every 1-2 years  Not required if history of left and right mastectomy Mammogram: 11/15/2021    Screening Current   Cervical Cancer Screening Between the ages of 21-29, pap smear recommended once every 3 years  Between the ages of 33-67, can perform pap smear with HPV co-testing every 5 years     Recommendations may differ for women with a history of total hysterectomy, cervical cancer, or abnormal pap smears in past  Pap Smear: 12/16/2021    Screening Not Indicated   Hepatitis C Screening Once for adults born between 1945 and 1965  More frequently in patients at high risk for Hepatitis C Hep C Antibody: 07/21/2020    Screening Current   Diabetes Screening 1-2 times per year if you're at risk for diabetes or have pre-diabetes Fasting glucose: 126 mg/dL   A1C: 5 3 %    Screening Current   Cholesterol Screening Once every 5 years if you don't have a lipid disorder  May order more often based on risk factors  Lipid panel: 07/21/2020    Screening Not Indicated  History Lipid Disorder     Other Preventive Screenings Covered by Medicare:  1  Abdominal Aortic Aneurysm (AAA) Screening: covered once if your at risk  You're considered to be at risk if you have a family history of AAA  2  Lung Cancer Screening: covers low dose CT scan once per year if you meet all of the following conditions: (1) Age 50-69; (2) No signs or symptoms of lung cancer; (3) Current smoker or have quit smoking within the last 15 years; (4) You have a tobacco smoking history of at least 30 pack years (packs per day multiplied by number of years you smoked); (5) You get a written order from a healthcare provider  3  Glaucoma Screening: covered annually if you're considered high risk: (1) You have diabetes OR (2) Family history of glaucoma OR (3)  aged 48 and older OR (3)  American aged 72 and older  3  Osteoporosis Screening: covered every 2 years if you meet one of the following conditions: (1) You're estrogen deficient and at risk for osteoporosis based off medical history and other findings; (2) Have a vertebral abnormality; (3) On glucocorticoid therapy for more than 3 months; (4) Have primary hyperparathyroidism; (5) On osteoporosis medications and need to assess response to drug therapy  · Last bone density test (DXA Scan): 09/29/2020   5  HIV Screening: covered annually if you're between the age of 15-65  Also covered annually if you are younger than 13 and older than 72 with risk factors for HIV infection   For pregnant patients, it is covered up to 3 times per pregnancy  Immunizations:  Immunization Recommendations   Influenza Vaccine Annual influenza vaccination during flu season is recommended for all persons aged >= 6 months who do not have contraindications   Pneumococcal Vaccine (Prevnar and Pneumovax)  * Prevnar = PCV13  * Pneumovax = PPSV23   Adults 25-60 years old: 1-3 doses may be recommended based on certain risk factors  Adults 72 years old: Prevnar (PCV13) vaccine recommended followed by Pneumovax (PPSV23) vaccine  If already received PPSV23 since turning 65, then PCV13 recommended at least one year after PPSV23 dose  Hepatitis B Vaccine 3 dose series if at intermediate or high risk (ex: diabetes, end stage renal disease, liver disease)   Tetanus (Td) Vaccine - COST NOT COVERED BY MEDICARE PART B Following completion of primary series, a booster dose should be given every 10 years to maintain immunity against tetanus  Td may also be given as tetanus wound prophylaxis  Tdap Vaccine - COST NOT COVERED BY MEDICARE PART B Recommended at least once for all adults  For pregnant patients, recommended with each pregnancy  Shingles Vaccine (Shingrix) - COST NOT COVERED BY MEDICARE PART B  2 shot series recommended in those aged 48 and above     Health Maintenance Due:      Topic Date Due    Breast Cancer Screening: Mammogram  11/15/2023    Colorectal Cancer Screening  05/20/2024    Hepatitis C Screening  Completed     Immunizations Due:      Topic Date Due    COVID-19 Vaccine (1) Never done    DTaP,Tdap,and Td Vaccines (1 - Tdap) Never done     Advance Directives   What are advance directives? Advance directives are legal documents that state your wishes and plans for medical care  These plans are made ahead of time in case you lose your ability to make decisions for yourself  Advance directives can apply to any medical decision, such as the treatments you want, and if you want to donate organs  What are the types of advance directives?   There are many types of advance directives, and each state has rules about how to use them  You may choose a combination of any of the following:  · Living will: This is a written record of the treatment you want  You can also choose which treatments you do not want, which to limit, and which to stop at a certain time  This includes surgery, medicine, IV fluid, and tube feedings  · Durable power of  for healthcare [facility]): This is a written record that states who you want to make healthcare choices for you when you are unable to make them for yourself  This person, called a proxy, is usually a family member or a friend  You may choose more than 1 proxy  · Do not resuscitate (DNR) order:  A DNR order is used in case your heart stops beating or you stop breathing  It is a request not to have certain forms of treatment, such as CPR  A DNR order may be included in other types of advance directives  · Medical directive: This covers the care that you want if you are in a coma, near death, or unable to make decisions for yourself  You can list the treatments you want for each condition  Treatment may include pain medicine, surgery, blood transfusions, dialysis, IV or tube feedings, and a ventilator (breathing machine)  · Values history: This document has questions about your views, beliefs, and how you feel and think about life  This information can help others choose the care that you would choose  Why are advance directives important? An advance directive helps you control your care  Although spoken wishes may be used, it is better to have your wishes written down  Spoken wishes can be misunderstood, or not followed  Treatments may be given even if you do not want them  An advance directive may make it easier for your family to make difficult choices about your care  Urinary Incontinence   Urinary incontinence (UI)  is when you lose control of your bladder   UI develops because your bladder cannot store or empty urine properly  The 3 most common types of UI are stress incontinence, urge incontinence, or both  Medicines:   · May be given to help strengthen your bladder control  Report any side effects of medication to your healthcare provider  Do pelvic muscle exercises often:  Your pelvic muscles help you stop urinating  Squeeze these muscles tight for 5 seconds, then relax for 5 seconds  Gradually work up to squeezing for 10 seconds  Do 3 sets of 15 repetitions a day, or as directed  This will help strengthen your pelvic muscles and improve bladder control  Train your bladder:  Go to the bathroom at set times, such as every 2 hours, even if you do not feel the urge to go  You can also try to hold your urine when you feel the urge to go  For example, hold your urine for 5 minutes when you feel the urge to go  As that becomes easier, hold your urine for 10 minutes  Self-care:   · Keep a UI record  Write down how often you leak urine and how much you leak  Make a note of what you were doing when you leaked urine  · Drink liquids as directed  You may need to limit the amount of liquid you drink to help control your urine leakage  Do not drink any liquid right before you go to bed  Limit or do not have drinks that contain caffeine or alcohol  · Prevent constipation  Eat a variety of high-fiber foods  Good examples are high-fiber cereals, beans, vegetables, and whole-grain breads  Walking is the best way to trigger your intestines to have a bowel movement  · Exercise regularly and maintain a healthy weight  Weight loss and exercise will decrease pressure on your bladder and help you control your leakage  · Use a catheter as directed  to help empty your bladder  A catheter is a tiny, plastic tube that is put into your bladder to drain your urine  · Go to behavior therapy as directed  Behavior therapy may be used to help you learn to control your urge to urinate         © Copyright [employer] 2018 Information is for End User's use only and may not be sold, redistributed or otherwise used for commercial purposes   All illustrations and images included in CareNotes® are the copyrighted property of A D A M , Inc  or [facility] Chicho Gordon

## 2022-06-02 NOTE — PROGRESS NOTES
Assessment/Plan:    No problem-specific Assessment & Plan notes found for this encounter  Diagnoses and all orders for this visit:    Medicare annual wellness visit, subsequent  Comments:  pt counseled on non compliance    Hypercholesterolemia  Comments:  pt sent for labs    Acquired hypothyroidism  Comments:  pt sent for labs    Tachycardia  Comments:  have TSH done          PHQ-2/9 Depression Screening    Little interest or pleasure in doing things: 1 - several days  Feeling down, depressed, or hopeless: 1 - several days  PHQ-2 Score: 2  PHQ-2 Interpretation: Negative depression screen            Subjective:      Patient ID: Skip Phillips is a 73 y o  female  Pt failed to have labs done    Thyroid Problem  Presents for follow-up visit  Symptoms include heat intolerance  Patient reports no constipation, diarrhea, fatigue or palpitations  The following portions of the patient's history were reviewed and updated as appropriate: allergies, current medications, past family history, past medical history, past social history, past surgical history and problem list     Review of Systems   Constitutional: Negative for chills, fatigue and fever  HENT: Negative  Negative for hearing loss  Eyes: Negative  Negative for visual disturbance  Respiratory: Negative for shortness of breath and wheezing  Cardiovascular: Negative for chest pain and palpitations  Gastrointestinal: Negative for abdominal pain, blood in stool, constipation, diarrhea, nausea and vomiting  Endocrine: Positive for heat intolerance  Genitourinary: Negative for difficulty urinating and dysuria  Musculoskeletal: Negative for arthralgias and myalgias  Skin: Negative  Allergic/Immunologic: Negative  Neurological: Negative for seizures and syncope  Hematological: Negative for adenopathy  Psychiatric/Behavioral: Negative            Objective:    /88   Pulse (!) 114   Temp (!) 97 4 °F (36 3 °C) (Tympanic)  5' 1" (1 549 m)   Wt 59 5 kg (131 lb 4 oz)   SpO2 100%   BMI 24 80 kg/m²      Physical Exam  Vitals and nursing note reviewed  Constitutional:       General: She is not in acute distress  Appearance: Normal appearance  She is well-developed  She is not ill-appearing, toxic-appearing or diaphoretic  HENT:      Head: Normocephalic and atraumatic  Right Ear: Tympanic membrane, ear canal and external ear normal  There is no impacted cerumen  Left Ear: Tympanic membrane, ear canal and external ear normal  There is no impacted cerumen  Nose: Nose normal  No congestion or rhinorrhea  Mouth/Throat:      Mouth: Mucous membranes are moist       Pharynx: Oropharynx is clear  No oropharyngeal exudate or posterior oropharyngeal erythema  Eyes:      General: No scleral icterus  Right eye: No discharge  Left eye: No discharge  Conjunctiva/sclera: Conjunctivae normal       Pupils: Pupils are equal, round, and reactive to light  Cardiovascular:      Rate and Rhythm: Normal rate and regular rhythm  Pulses: Normal pulses  Heart sounds: Normal heart sounds  No murmur heard  Pulmonary:      Effort: Pulmonary effort is normal  No respiratory distress  Breath sounds: Normal breath sounds  No wheezing, rhonchi or rales  Abdominal:      General: Bowel sounds are normal  There is no distension  Palpations: Abdomen is soft  There is no mass  Tenderness: There is no abdominal tenderness  There is no guarding or rebound  Musculoskeletal:         General: Normal range of motion  Cervical back: Normal range of motion and neck supple  No rigidity  Right lower leg: No edema  Left lower leg: No edema  Lymphadenopathy:      Cervical: No cervical adenopathy  Skin:     General: Skin is warm and dry  Findings: No rash  Neurological:      General: No focal deficit present        Mental Status: She is alert and oriented to person, place, and time       Sensory: No sensory deficit  Motor: No weakness or abnormal muscle tone  Coordination: Coordination normal       Gait: Gait normal       Deep Tendon Reflexes: Reflexes are normal and symmetric  Psychiatric:         Mood and Affect: Mood normal          Behavior: Behavior normal          Thought Content:  Thought content normal          Judgment: Judgment normal

## 2022-06-02 NOTE — PROGRESS NOTES
Assessment and Plan:     Problem List Items Addressed This Visit        Other    Overweight (BMI 25 0-29  9)      Other Visit Diagnoses     Medicare annual wellness visit, subsequent    -  Primary    Acquired hypothyroidism               Preventive health issues were discussed with patient, and age appropriate screening tests were ordered as noted in patient's After Visit Summary  Personalized health advice and appropriate referrals for health education or preventive services given if needed, as noted in patient's After Visit Summary  History of Present Illness:     Patient presents for Medicare Annual Wellness visit    Patient Care Team:  Cory Elizondo DO as PCP - General (Family Medicine)     Problem List:     Patient Active Problem List   Diagnosis    Overweight (BMI 25 0-29  9)      Past Medical and Surgical History:     Past Medical History:   Diagnosis Date    Asthma     Depression     Disease of thyroid gland     Hypercholesteremia     Migraine     Osteoporosis      Past Surgical History:   Procedure Laterality Date    FINGER FRACTURE SURGERY      HYSTERECTOMY      LEG SURGERY      RECTAL PROLAPSE REPAIR      TOTAL ABDOMINAL HYSTERECTOMY W/ BILATERAL SALPINGOOPHORECTOMY        Family History:     Family History   Problem Relation Age of Onset    No Known Problems Mother     Cancer Father     Ovarian cancer Maternal Grandmother     No Known Problems Paternal Grandmother     No Known Problems Paternal Aunt     No Known Problems Paternal Aunt       Social History:     Social History     Socioeconomic History    Marital status: /Civil Union     Spouse name: None    Number of children: None    Years of education: None    Highest education level: None   Occupational History    None   Tobacco Use    Smoking status: Former Smoker    Smokeless tobacco: Never Used   Vaping Use    Vaping Use: Never used   Substance and Sexual Activity    Alcohol use: Yes     Comment: social    Drug use: No    Sexual activity: Not Currently     Partners: Male   Other Topics Concern    None   Social History Narrative    None     Social Determinants of Health     Financial Resource Strain: Not on file   Food Insecurity: Not on file   Transportation Needs: Not on file   Physical Activity: Not on file   Stress: Not on file   Social Connections: Not on file   Intimate Partner Violence: Not on file   Housing Stability: Not on file      Medications and Allergies:     Current Outpatient Medications   Medication Sig Dispense Refill    butalbital-acetaminophen-caffeine (FIORICET,ESGIC) -40 mg per tablet TAKE 1 TABLET BY MOUTH EVERY 6 (SIX) HOURS AS NEEDED FOR HEADACHES 60 tablet 0    Cholecalciferol (VITAMIN D PO) Take by mouth      Coenzyme Q10 (COQ10) 100 MG CAPS Take by mouth      ezetimibe-simvastatin (VYTORIN) 10-40 mg per tablet TAKE 1 TABLET BY MOUTH DAILY AT BEDTIME 90 tablet 1    fenofibrate micronized (ANTARA) 130 MG capsule TAKE 1 CAPSULE (130 MG TOTAL) BY MOUTH DAILY WITH BREAKFAST 90 capsule 1    fluticasone (FLONASE) 50 mcg/act nasal spray       ibandronate (BONIVA) 150 MG tablet Take 1 tablet (150 mg total) by mouth every 30 (thirty) days 1 tablet 6    levothyroxine 50 mcg tablet TAKE 1 TABLET (50 MCG TOTAL) BY MOUTH DAILY 90 tablet 1    Magnesium 100 MG TABS Take by mouth      Multiple Vitamin (MULTI-VITAMIN DAILY PO) once daily      naproxen (NAPROSYN) 500 mg tablet       Omega-3 1000 MG CAPS Take by mouth      phentermine (ADIPEX-P) 37 5 MG tablet TAKE 1 TABLET (37 5 MG TOTAL) BY MOUTH DAILY 90 tablet 0    Potassium Gluconate 80 MG TABS Take by mouth      progesterone (PROMETRIUM) 100 MG capsule Take 150 mg by mouth daily      sertraline (ZOLOFT) 100 mg tablet Take 1 tablet (100 mg total) by mouth daily 30 tablet 6    SUMAtriptan Succinate (IMITREX IJ) Inject as directed      valACYclovir (VALTREX) 1,000 mg tablet Take 1 tablet (1,000 mg total) by mouth 2 (two) times a day for 7 days 14 tablet 0    Calcium 200 MG TABS Take by mouth (Patient not taking: Reported on 6/2/2022)      LORazepam (ATIVAN) 1 mg tablet TAKE 1 TABLET (1 MG TOTAL) BY MOUTH AS NEEDED FOR ANXIETY (Patient not taking: No sig reported) 30 tablet 0    nicotine polacrilex (NICORETTE) 2 mg gum 2mg per piece 6-8 pieces daily (Patient not taking: Reported on 6/2/2022)       No current facility-administered medications for this visit  Allergies   Allergen Reactions    Bee Venom     Latex       Immunizations:     Immunization History   Administered Date(s) Administered    INFLUENZA 10/06/2018, 10/15/2019    Influenza, high dose seasonal 0 7 mL 10/06/2020, 11/18/2021    Pneumococcal Conjugate 13-Valent 08/25/2020    Pneumococcal Polysaccharide PPV23 08/26/2021    influenza, injectable, quadrivalent 10/06/2018    influenza, trivalent, adjuvanted 10/03/2019, 10/15/2019      Health Maintenance:         Topic Date Due    Breast Cancer Screening: Mammogram  11/15/2023    Colorectal Cancer Screening  05/20/2024    Hepatitis C Screening  Completed         Topic Date Due    COVID-19 Vaccine (1) Never done    DTaP,Tdap,and Td Vaccines (1 - Tdap) Never done      Medicare Health Risk Assessment:     /88   Pulse (!) 114   Temp (!) 97 4 °F (36 3 °C) (Tympanic)   Ht 5' 1" (1 549 m)   Wt 59 5 kg (131 lb 4 oz)   SpO2 100%   BMI 24 80 kg/m²      Shawn Aguilar is here for her Subsequent Wellness visit  Health Risk Assessment:   Patient rates overall health as good  Patient feels that their physical health rating is same  Patient is satisfied with their life  Eyesight was rated as same  Hearing was rated as same  Patient feels that their emotional and mental health rating is slightly worse  Patients states they are sometimes angry  Patient states they are always unusually tired/fatigued  Pain experienced in the last 7 days has been none   Patient states that she has experienced weight loss or gain in last 6 months  Depression Screening:   PHQ-2 Score: 2      Fall Risk Screening: In the past year, patient has experienced: no history of falling in past year      Urinary Incontinence Screening:   Patient has leaked urine accidently in the last six months  Home Safety:  Patient does not have trouble with stairs inside or outside of their home  Patient has working smoke alarms and has working carbon monoxide detector  Home safety hazards include: none  Nutrition:   Current diet is Regular  Medications:   Patient is currently taking over-the-counter supplements  OTC medications include: see medication list  Patient is able to manage medications  Activities of Daily Living (ADLs)/Instrumental Activities of Daily Living (IADLs):   Walk and transfer into and out of bed and chair?: Yes  Dress and groom yourself?: Yes    Bathe or shower yourself?: Yes    Feed yourself? Yes  Do your laundry/housekeeping?: Yes  Manage your money, pay your bills and track your expenses?: Yes  Make your own meals?: Yes    Do your own shopping?: Yes    Previous Hospitalizations:   Any hospitalizations or ED visits within the last 12 months?: Yes    How many hospitalizations have you had in the last year?: 1-2    Advance Care Planning:   Living will: Yes    Durable POA for healthcare:  Yes    Advanced directive: Yes    Advanced directive counseling given: No    Five wishes given: No    Patient declined ACP directive: No    End of Life Decisions reviewed with patient: Yes    Provider agrees with end of life decisions: Yes      Cognitive Screening:   Provider or family/friend/caregiver concerned regarding cognition?: No    PREVENTIVE SCREENINGS      Cardiovascular Screening:    General: Screening Not Indicated and History Lipid Disorder      Diabetes Screening:     General: Screening Current      Colorectal Cancer Screening:     General: Screening Current      Breast Cancer Screening:     General: Screening Current      Cervical Cancer Screening:    General: Screening Not Indicated      Osteoporosis Screening:    General: Screening Not Indicated and History Osteoporosis      Abdominal Aortic Aneurysm (AAA) Screening:        General: Screening Not Indicated      Lung Cancer Screening:     General: Screening Not Indicated      Hepatitis C Screening:    General: Screening Current    Screening, Brief Intervention, and Referral to Treatment (SBIRT)    Screening  Typical number of drinks in a day: 0  Typical number of drinks in a week: 0  Interpretation: Low risk drinking behavior      Single Item Drug Screening:  How often have you used an illegal drug (including marijuana) or a prescription medication for non-medical reasons in the past year? never    Single Item Drug Screen Score: 0  Interpretation: Negative screen for possible drug use disorder      Nikolai Cedeno, DO

## 2022-06-08 DIAGNOSIS — G43.809 OTHER MIGRAINE WITHOUT STATUS MIGRAINOSUS, NOT INTRACTABLE: ICD-10-CM

## 2022-06-08 RX ORDER — BUTALBITAL, ACETAMINOPHEN AND CAFFEINE 50; 325; 40 MG/1; MG/1; MG/1
1 TABLET ORAL EVERY 6 HOURS PRN
Qty: 60 TABLET | Refills: 0 | Status: SHIPPED | OUTPATIENT
Start: 2022-06-08

## 2022-06-10 ENCOUNTER — APPOINTMENT (OUTPATIENT)
Dept: LAB | Facility: CLINIC | Age: 72
End: 2022-06-10
Payer: MEDICARE

## 2022-06-10 DIAGNOSIS — E78.00 HYPERCHOLESTEROLEMIA: ICD-10-CM

## 2022-06-10 DIAGNOSIS — R03.0 ELEVATED BLOOD PRESSURE READING WITHOUT DIAGNOSIS OF HYPERTENSION: ICD-10-CM

## 2022-06-10 DIAGNOSIS — E03.9 ACQUIRED HYPOTHYROIDISM: ICD-10-CM

## 2022-06-10 LAB
ALBUMIN SERPL BCP-MCNC: 3.9 G/DL (ref 3.5–5)
ALP SERPL-CCNC: 71 U/L (ref 46–116)
ALT SERPL W P-5'-P-CCNC: 27 U/L (ref 12–78)
ANION GAP SERPL CALCULATED.3IONS-SCNC: 6 MMOL/L (ref 4–13)
AST SERPL W P-5'-P-CCNC: 23 U/L (ref 5–45)
BASOPHILS # BLD AUTO: 0.03 THOUSANDS/ΜL (ref 0–0.1)
BASOPHILS NFR BLD AUTO: 1 % (ref 0–1)
BILIRUB SERPL-MCNC: 0.4 MG/DL (ref 0.2–1)
BUN SERPL-MCNC: 9 MG/DL (ref 5–25)
CALCIUM SERPL-MCNC: 9.6 MG/DL (ref 8.3–10.1)
CHLORIDE SERPL-SCNC: 111 MMOL/L (ref 100–108)
CHOLEST SERPL-MCNC: 143 MG/DL
CO2 SERPL-SCNC: 27 MMOL/L (ref 21–32)
CREAT SERPL-MCNC: 0.71 MG/DL (ref 0.6–1.3)
EOSINOPHIL # BLD AUTO: 0.26 THOUSAND/ΜL (ref 0–0.61)
EOSINOPHIL NFR BLD AUTO: 4 % (ref 0–6)
ERYTHROCYTE [DISTWIDTH] IN BLOOD BY AUTOMATED COUNT: 13.6 % (ref 11.6–15.1)
GFR SERPL CREATININE-BSD FRML MDRD: 87 ML/MIN/1.73SQ M
GLUCOSE P FAST SERPL-MCNC: 90 MG/DL (ref 65–99)
HCT VFR BLD AUTO: 48.4 % (ref 34.8–46.1)
HDLC SERPL-MCNC: 51 MG/DL
HGB BLD-MCNC: 15.2 G/DL (ref 11.5–15.4)
IMM GRANULOCYTES # BLD AUTO: 0 THOUSAND/UL (ref 0–0.2)
IMM GRANULOCYTES NFR BLD AUTO: 0 % (ref 0–2)
LDLC SERPL CALC-MCNC: 72 MG/DL (ref 0–100)
LYMPHOCYTES # BLD AUTO: 1.73 THOUSANDS/ΜL (ref 0.6–4.47)
LYMPHOCYTES NFR BLD AUTO: 28 % (ref 14–44)
MCH RBC QN AUTO: 29.6 PG (ref 26.8–34.3)
MCHC RBC AUTO-ENTMCNC: 31.4 G/DL (ref 31.4–37.4)
MCV RBC AUTO: 94 FL (ref 82–98)
MONOCYTES # BLD AUTO: 0.4 THOUSAND/ΜL (ref 0.17–1.22)
MONOCYTES NFR BLD AUTO: 7 % (ref 4–12)
NEUTROPHILS # BLD AUTO: 3.75 THOUSANDS/ΜL (ref 1.85–7.62)
NEUTS SEG NFR BLD AUTO: 60 % (ref 43–75)
NONHDLC SERPL-MCNC: 92 MG/DL
NRBC BLD AUTO-RTO: 0 /100 WBCS
PLATELET # BLD AUTO: 269 THOUSANDS/UL (ref 149–390)
PMV BLD AUTO: 11.5 FL (ref 8.9–12.7)
POTASSIUM SERPL-SCNC: 3.8 MMOL/L (ref 3.5–5.3)
PROT SERPL-MCNC: 7.4 G/DL (ref 6.4–8.2)
RBC # BLD AUTO: 5.13 MILLION/UL (ref 3.81–5.12)
SODIUM SERPL-SCNC: 144 MMOL/L (ref 136–145)
TRIGL SERPL-MCNC: 102 MG/DL
TSH SERPL DL<=0.05 MIU/L-ACNC: 1.12 UIU/ML (ref 0.45–4.5)
WBC # BLD AUTO: 6.17 THOUSAND/UL (ref 4.31–10.16)

## 2022-06-10 PROCEDURE — 80053 COMPREHEN METABOLIC PANEL: CPT

## 2022-06-10 PROCEDURE — 85025 COMPLETE CBC W/AUTO DIFF WBC: CPT

## 2022-06-10 PROCEDURE — 84443 ASSAY THYROID STIM HORMONE: CPT

## 2022-06-10 PROCEDURE — 36415 COLL VENOUS BLD VENIPUNCTURE: CPT

## 2022-06-10 PROCEDURE — 80061 LIPID PANEL: CPT

## 2022-06-21 DIAGNOSIS — R63.4 WEIGHT LOSS: ICD-10-CM

## 2022-06-22 RX ORDER — PHENTERMINE HYDROCHLORIDE 37.5 MG/1
37.5 TABLET ORAL DAILY
Qty: 90 TABLET | Refills: 0 | Status: SHIPPED | OUTPATIENT
Start: 2022-06-22

## 2022-07-06 ENCOUNTER — OFFICE VISIT (OUTPATIENT)
Dept: FAMILY MEDICINE CLINIC | Facility: CLINIC | Age: 72
End: 2022-07-06
Payer: MEDICARE

## 2022-07-06 VITALS
BODY MASS INDEX: 26.06 KG/M2 | WEIGHT: 138 LBS | OXYGEN SATURATION: 94 % | SYSTOLIC BLOOD PRESSURE: 152 MMHG | DIASTOLIC BLOOD PRESSURE: 82 MMHG | TEMPERATURE: 98.3 F | HEIGHT: 61 IN | HEART RATE: 119 BPM

## 2022-07-06 DIAGNOSIS — E03.9 HYPOTHYROIDISM, UNSPECIFIED TYPE: ICD-10-CM

## 2022-07-06 DIAGNOSIS — R23.2 HOT FLASHES: Primary | ICD-10-CM

## 2022-07-06 DIAGNOSIS — E66.3 OVERWEIGHT (BMI 25.0-29.9): ICD-10-CM

## 2022-07-06 PROCEDURE — 99213 OFFICE O/P EST LOW 20 MIN: CPT

## 2022-07-06 NOTE — PROGRESS NOTES
Assessment/Plan:  32-year-old female presented with isolated intermittent hot flashes for the past month  Repeat TSH did not show any acute changes  Patient continues to experience same complaint  Noted taking hormone therapy by an independent practitioner  No findings on chart  Follow-up in 1 week with labs  -patient directed to bring relevant medical records and medication list from the independent provider to documented in chart and further manage  No problem-specific Assessment & Plan notes found for this encounter  Diagnoses and all orders for this visit:    Hot flashes  Comments:  1-2 months  onset  intermittent, unchanged  scan Independant provider notes/meds in chart on next visit  f/u with labs in 1 wk   Orders:  -     T4, free; Future  -     Cancel: Cortisol Level, AM Specimen; Future  -     FSH and LH; Future  -     Testosterone; Future  -     Estrogens, total; Future    Hypothyroidism, unspecified type  Comments:  continue same medication dose   f/u in 1 wk     Orders:  -     T4, free; Future  -     Cancel: Cortisol Level, AM Specimen; Future  -     FSH and LH; Future  -     Testosterone; Future  -     Estrogens, total; Future    Overweight (BMI 25 0-29  9)  Comments:  stable  diet and exercise counsling          PHQ-2/9 Depression Screening    Little interest or pleasure in doing things: 0 - not at all  Feeling down, depressed, or hopeless: 0 - not at all  PHQ-2 Score: 0  PHQ-2 Interpretation: Negative depression screen            Subjective:      Patient ID: Taylor Jordan is a 73 y o  female  32-year-old female with a past medical history notable for hypothyroidism presented to the office today for follow-up regarding a complaint of on/off hot flashes for the past month  Same since onset, occurs every other day with no specific timing      No associated chills, fever, dizziness or lightheadedness, chest pain or palpitations, shortness of breath or chest tightness, nausea or vomiting, difficulty or pain with swallowing, diarrhea constipation, hirsutism or his dry skin, anxiety or restlessness  No associated change in appetite or weight  Of note:  She reports seeing a wellness doctor that prescribes for her hormone replacement therapy and mention to her recently that she had a elevated levels of serum testosterone  No documentation on chart noted  No estrogen/testosterone serum levels on chart  The following portions of the patient's history were reviewed and updated as appropriate: allergies, past family history and past social history  Review of Systems   Constitutional: Negative for activity change, appetite change, chills, diaphoresis, fatigue, fever and unexpected weight change  HENT: Negative for congestion, drooling, postnasal drip, rhinorrhea, sinus pressure, sinus pain, sneezing, sore throat, tinnitus, trouble swallowing and voice change  Eyes: Negative for visual disturbance  Respiratory: Negative for apnea, cough, chest tightness, shortness of breath and wheezing  Cardiovascular: Negative for chest pain, palpitations and leg swelling  Gastrointestinal: Negative for abdominal pain, constipation, diarrhea, nausea and vomiting  Endocrine: Positive for heat intolerance  Negative for cold intolerance, polydipsia, polyphagia and polyuria  Genitourinary: Positive for urgency  Negative for dysuria, enuresis, flank pain, frequency, hematuria and vaginal discharge  Musculoskeletal: Negative for arthralgias, back pain and gait problem  Neurological: Negative for dizziness, weakness, light-headedness and headaches  Psychiatric/Behavioral: Negative for agitation and behavioral problems  Objective:    /82 (BP Location: Left arm, Patient Position: Sitting)   Pulse (!) 119   Temp 98 3 °F (36 8 °C) (Tympanic)   Ht 5' 1" (1 549 m)   Wt 62 6 kg (138 lb)   SpO2 94%   BMI 26 07 kg/m²      Physical Exam  Vitals and nursing note reviewed  Constitutional:       General: She is not in acute distress  Appearance: Normal appearance  She is well-developed  She is not ill-appearing, toxic-appearing or diaphoretic  HENT:      Head: Normocephalic and atraumatic  Right Ear: Tympanic membrane, ear canal and external ear normal  There is no impacted cerumen  Left Ear: Tympanic membrane, ear canal and external ear normal  There is no impacted cerumen  Nose: Nose normal  No congestion or rhinorrhea  Mouth/Throat:      Mouth: Mucous membranes are moist       Pharynx: Oropharynx is clear  No oropharyngeal exudate or posterior oropharyngeal erythema  Eyes:      General: No scleral icterus  Right eye: No discharge  Left eye: No discharge  Conjunctiva/sclera: Conjunctivae normal       Pupils: Pupils are equal, round, and reactive to light  Cardiovascular:      Rate and Rhythm: Normal rate and regular rhythm  Pulses: Normal pulses  Heart sounds: Normal heart sounds  No murmur heard  Pulmonary:      Effort: Pulmonary effort is normal  No respiratory distress  Breath sounds: Normal breath sounds  No wheezing, rhonchi or rales  Abdominal:      General: Bowel sounds are normal  There is no distension  Palpations: Abdomen is soft  There is no mass  Tenderness: There is no abdominal tenderness  There is no guarding or rebound  Musculoskeletal:         General: Normal range of motion  Cervical back: Normal range of motion and neck supple  No rigidity  Right lower leg: No edema  Left lower leg: No edema  Lymphadenopathy:      Cervical: No cervical adenopathy  Skin:     General: Skin is warm and dry  Capillary Refill: Capillary refill takes less than 2 seconds  Findings: No rash  Neurological:      General: No focal deficit present  Mental Status: She is alert and oriented to person, place, and time  Sensory: No sensory deficit        Motor: No weakness or abnormal muscle tone  Coordination: Coordination normal       Gait: Gait normal       Deep Tendon Reflexes: Reflexes are normal and symmetric  Psychiatric:         Mood and Affect: Mood normal          Behavior: Behavior normal          Thought Content:  Thought content normal          Judgment: Judgment normal

## 2022-07-08 ENCOUNTER — LAB (OUTPATIENT)
Dept: LAB | Facility: CLINIC | Age: 72
End: 2022-07-08
Payer: MEDICARE

## 2022-07-08 DIAGNOSIS — R23.2 HOT FLASHES: ICD-10-CM

## 2022-07-08 DIAGNOSIS — E03.9 HYPOTHYROIDISM, UNSPECIFIED TYPE: Primary | ICD-10-CM

## 2022-07-08 LAB
FSH SERPL-ACNC: 35.7 MIU/ML
LH SERPL-ACNC: 16.8 MIU/ML
T4 FREE SERPL-MCNC: 1.04 NG/DL (ref 0.76–1.46)
TESTOST SERPL-MCNC: 45 NG/DL

## 2022-07-08 PROCEDURE — 82672 ASSAY OF ESTROGEN: CPT

## 2022-07-08 PROCEDURE — 83002 ASSAY OF GONADOTROPIN (LH): CPT

## 2022-07-08 PROCEDURE — 83001 ASSAY OF GONADOTROPIN (FSH): CPT

## 2022-07-08 PROCEDURE — 84403 ASSAY OF TOTAL TESTOSTERONE: CPT

## 2022-07-08 PROCEDURE — 36415 COLL VENOUS BLD VENIPUNCTURE: CPT

## 2022-07-08 PROCEDURE — 84439 ASSAY OF FREE THYROXINE: CPT

## 2022-07-11 ENCOUNTER — TELEPHONE (OUTPATIENT)
Dept: OTHER | Facility: HOSPITAL | Age: 72
End: 2022-07-11

## 2022-07-11 NOTE — TELEPHONE ENCOUNTER
Attempt made to call the patient today  No response    Left voice message    Discussion about the following postponed till the visit on 07/13/2022:    -recent blood tests results  -outside medication sheet

## 2022-07-13 ENCOUNTER — OFFICE VISIT (OUTPATIENT)
Dept: FAMILY MEDICINE CLINIC | Facility: CLINIC | Age: 72
End: 2022-07-13
Payer: MEDICARE

## 2022-07-13 VITALS
SYSTOLIC BLOOD PRESSURE: 140 MMHG | TEMPERATURE: 98.2 F | DIASTOLIC BLOOD PRESSURE: 92 MMHG | OXYGEN SATURATION: 98 % | WEIGHT: 131 LBS | HEART RATE: 76 BPM | HEIGHT: 61 IN | BODY MASS INDEX: 24.73 KG/M2

## 2022-07-13 DIAGNOSIS — E78.00 HYPERCHOLESTEROLEMIA: ICD-10-CM

## 2022-07-13 DIAGNOSIS — M81.0 AGE-RELATED OSTEOPOROSIS WITHOUT CURRENT PATHOLOGICAL FRACTURE: ICD-10-CM

## 2022-07-13 DIAGNOSIS — R15.9 INCONTINENCE OF FECES WITH FECAL URGENCY: ICD-10-CM

## 2022-07-13 DIAGNOSIS — N95.1 HOT FLASH, MENOPAUSAL: Primary | ICD-10-CM

## 2022-07-13 DIAGNOSIS — M81.0 POSTMENOPAUSAL BONE LOSS: ICD-10-CM

## 2022-07-13 DIAGNOSIS — R15.2 INCONTINENCE OF FECES WITH FECAL URGENCY: ICD-10-CM

## 2022-07-13 DIAGNOSIS — M81.8 OTHER OSTEOPOROSIS WITHOUT CURRENT PATHOLOGICAL FRACTURE: ICD-10-CM

## 2022-07-13 DIAGNOSIS — E66.3 OVERWEIGHT (BMI 25.0-29.9): ICD-10-CM

## 2022-07-13 LAB — ESTROGEN SERPL-MCNC: 66 PG/ML (ref 40–244)

## 2022-07-13 PROCEDURE — 99213 OFFICE O/P EST LOW 20 MIN: CPT | Performed by: FAMILY MEDICINE

## 2022-07-13 RX ORDER — PROGESTERONE 100 MG/1
150 CAPSULE ORAL DAILY
COMMUNITY

## 2022-07-13 NOTE — PROGRESS NOTES
Assessment/Plan:    No problem-specific Assessment & Plan notes found for this encounter  Diagnoses and all orders for this visit:    Hot flash, menopausal  Comments:  estrogen,Tst,LH,FSH: unremarkable  on HRT per ionfertility doctor   f/u in 6 months       Overweight (BMI 25 0-29  9)  Comments:  diet and exercise cousling    Hypercholesterolemia  Comments:  stable  f/u accordingly    Postmenopausal bone loss  Comments:  will complete 5 yr boniva therapy after 2 doses  f/u dexa accordingly   Orders:  -     DXA bone density spine hip and pelvis; Future    Age-related osteoporosis without current pathological fracture  Comments:  f/u dexa accordingly   will complete full 5 yrs course of boniva in 2doses  Orders:  -     DXA bone density spine hip and pelvis; Future    Incontinence of feces with fecal urgency  Comments:  s/p hystrectomy   f/u with surgx accordingly encouraged    Other osteoporosis without current pathological fracture    Other orders  -     Progesterone 100 MG CAPS; Take 150 mg by mouth in the morning  -     Nutritional Supplements (DHEA PO); Take 10 mg by mouth in the morning  -     Discontinue: Nutritional Supplements (DHEA PO); Take 10 mg by mouth in the morning          PHQ-2/9 Depression Screening              Subjective:      Patient ID: Marivel Madrigal is a 73 y o  female  62-year-old female with a past medical history notable for hypothyroidism presented to the office today for follow-up regarding a complaint of on/off hot flashes for the past month  Same since onset, occurs every other day with no specific timing      No associated chills, fever, dizziness or lightheadedness, chest pain or palpitations, shortness of breath or chest tightness, nausea or vomiting, difficulty or pain with swallowing, diarrhea constipation, hirsutism or his dry skin, anxiety or restlessness  No associated change in appetite or weight      She presented today for one-week follow-up    Patient is seeing Dr Diana Jane fertility physician and he is prescribing for her estrogen cream, progesterone pills, DHEA pills accordingly  Hot flashes and heat intolerance symptoms are mostly due to the hormonal  therapy  The following portions of the patient's history were reviewed and updated as appropriate:   She  has a past medical history of Asthma, Depression, Disease of thyroid gland, Hypercholesteremia, Migraine, and Osteoporosis  She   Patient Active Problem List    Diagnosis Date Noted    Hypercholesterolemia 06/02/2022    Overweight (BMI 25 0-29 9) 04/07/2021     She  has a past surgical history that includes Total abdominal hysterectomy w/ bilateral salpingoophorectomy; Leg Surgery; Finger fracture surgery; Hysterectomy; and Rectal prolapse repair  Her family history includes Cancer in her father; No Known Problems in her mother, paternal aunt, paternal aunt, and paternal grandmother; Ovarian cancer in her maternal grandmother  She  reports that she has quit smoking  She has never used smokeless tobacco  She reports current alcohol use  She reports that she does not use drugs       Review of systems    Constitutional: Negative for activity change, appetite change, chills, diaphoresis, fatigue, fever and unexpected weight change  HENT: Negative for congestion, drooling, postnasal drip, rhinorrhea, sinus pressure, sinus pain, sneezing, sore throat, tinnitus, trouble swallowing and voice change  Eyes: Negative for visual disturbance  Respiratory: Negative for apnea, cough, chest tightness, shortness of breath and wheezing  Cardiovascular: Negative for chest pain, palpitations and leg swelling  Gastrointestinal: Negative for abdominal pain, constipation, diarrhea, nausea and vomiting  Endocrine: Positive for heat intolerance  Negative for cold intolerance, polydipsia, polyphagia and polyuria  Genitourinary: Positive for urgency   Negative for dysuria, enuresis, flank pain, frequency, hematuria and vaginal discharge  Musculoskeletal: Negative for arthralgias, back pain and gait problem  Neurological: Negative for dizziness, weakness, light-headedness and headaches  Psychiatric/Behavioral: Negative for agitation and behavioral problems  Objective:    /92 (BP Location: Left arm, Patient Position: Sitting, Cuff Size: Standard)   Pulse 76   Temp 98 2 °F (36 8 °C) (Tympanic)   Ht 5' 1" (1 549 m)   Wt 59 4 kg (131 lb)   SpO2 98%   BMI 24 75 kg/m²      Physical Exam  Constitutional:       General: She is not in acute distress  Appearance: Normal appearance  She is normal weight  She is not ill-appearing or diaphoretic  HENT:      Head: Normocephalic and atraumatic  Nose: No congestion  Mouth/Throat:      Mouth: Mucous membranes are moist       Pharynx: Oropharynx is clear  Eyes:      Conjunctiva/sclera: Conjunctivae normal       Pupils: Pupils are equal, round, and reactive to light  Cardiovascular:      Rate and Rhythm: Normal rate and regular rhythm  Pulses: Normal pulses  Heart sounds: Normal heart sounds  No murmur heard  Pulmonary:      Effort: Pulmonary effort is normal       Breath sounds: Normal breath sounds  No wheezing, rhonchi or rales  Abdominal:      General: Abdomen is flat  Bowel sounds are normal       Palpations: Abdomen is soft  Musculoskeletal:         General: Normal range of motion  Cervical back: Normal range of motion and neck supple  Skin:     General: Skin is warm  Capillary Refill: Capillary refill takes less than 2 seconds  Neurological:      General: No focal deficit present  Mental Status: She is alert  Mental status is at baseline     Psychiatric:         Mood and Affect: Mood normal          Behavior: Behavior normal

## 2022-09-21 DIAGNOSIS — R63.4 WEIGHT LOSS: ICD-10-CM

## 2022-09-22 RX ORDER — PHENTERMINE HYDROCHLORIDE 37.5 MG/1
37.5 TABLET ORAL DAILY
Qty: 90 TABLET | Refills: 0 | Status: SHIPPED | OUTPATIENT
Start: 2022-09-22

## 2022-10-03 ENCOUNTER — HOSPITAL ENCOUNTER (OUTPATIENT)
Dept: BONE DENSITY | Facility: HOSPITAL | Age: 72
Discharge: HOME | End: 2022-10-03
Payer: MEDICARE

## 2022-10-03 DIAGNOSIS — M81.0 POSTMENOPAUSAL BONE LOSS: ICD-10-CM

## 2022-10-03 DIAGNOSIS — M81.0 AGE-RELATED OSTEOPOROSIS WITHOUT CURRENT PATHOLOGICAL FRACTURE: ICD-10-CM

## 2022-10-03 DIAGNOSIS — G43.809 OTHER MIGRAINE WITHOUT STATUS MIGRAINOSUS, NOT INTRACTABLE: ICD-10-CM

## 2022-10-03 PROCEDURE — 77080 DXA BONE DENSITY AXIAL: CPT

## 2022-10-04 RX ORDER — BUTALBITAL, ACETAMINOPHEN AND CAFFEINE 50; 325; 40 MG/1; MG/1; MG/1
1 TABLET ORAL EVERY 6 HOURS PRN
Qty: 60 TABLET | Refills: 0 | Status: SHIPPED | OUTPATIENT
Start: 2022-10-04

## 2022-10-12 ENCOUNTER — TELEPHONE (OUTPATIENT)
Dept: OTHER | Facility: OTHER | Age: 72
End: 2022-10-12

## 2022-10-12 ENCOUNTER — OFFICE VISIT (OUTPATIENT)
Dept: FAMILY MEDICINE CLINIC | Facility: CLINIC | Age: 72
End: 2022-10-12
Payer: MEDICARE

## 2022-10-12 VITALS
TEMPERATURE: 97.5 F | HEART RATE: 62 BPM | SYSTOLIC BLOOD PRESSURE: 138 MMHG | BODY MASS INDEX: 24.55 KG/M2 | OXYGEN SATURATION: 95 % | DIASTOLIC BLOOD PRESSURE: 90 MMHG | WEIGHT: 130 LBS | HEIGHT: 61 IN

## 2022-10-12 DIAGNOSIS — Z23 ENCOUNTER FOR IMMUNIZATION: ICD-10-CM

## 2022-10-12 DIAGNOSIS — M19.042 PRIMARY OSTEOARTHRITIS OF BOTH HANDS: ICD-10-CM

## 2022-10-12 DIAGNOSIS — S39.92XA INJURY OF LOW BACK, INITIAL ENCOUNTER: ICD-10-CM

## 2022-10-12 DIAGNOSIS — M81.0 OSTEOPOROSIS WITHOUT CURRENT PATHOLOGICAL FRACTURE, UNSPECIFIED OSTEOPOROSIS TYPE: Primary | ICD-10-CM

## 2022-10-12 DIAGNOSIS — M19.041 PRIMARY OSTEOARTHRITIS OF BOTH HANDS: ICD-10-CM

## 2022-10-12 PROCEDURE — 90662 IIV NO PRSV INCREASED AG IM: CPT | Performed by: FAMILY MEDICINE

## 2022-10-12 PROCEDURE — G0008 ADMIN INFLUENZA VIRUS VAC: HCPCS | Performed by: FAMILY MEDICINE

## 2022-10-12 PROCEDURE — 99214 OFFICE O/P EST MOD 30 MIN: CPT | Performed by: FAMILY MEDICINE

## 2022-10-12 NOTE — ASSESSMENT & PLAN NOTE
Background:  Patient is status post bisphosphonate therapy for 5 years now  Repeat DEXA scan shows similar levels of osteoporosis involving the left femoral neck and back   Improvement in the right femoral neck  last fracture 2 yrs ago ; XR: Acute mildly displaced fracture at the base of the 4th proximal phalanx and nondisplaced fracture at the base of the 5th proximal phalanx    ( result of trauma)  S/p ortho eval and PT  No surgx needed that time  Past medical history notable for hysterectomy on 2016 since then the patient have been on [facility] replacement therapy with high estrogen  Supplement      Follow-up DEXA scan on 10/22  Since a DXA study from 9/29/2020, there has been:  A  STATISTICALLY SIGNIFICANT DECREASE in bone mineral density of  -0 028 g/cm2 (-3 4)% in the hips  Plan:   -despite the patient completing a 5 year course on 10/22 of bisphosphonate and expressing been compliant to the medication still her osteoporosis is progressing at this time    Given her past medical history and current use of hormone replacement therapy could be the main cause of her current osteoporosis degree   -refer to endocrinology for further advice and recommendations at this time   -follow-up accordingly

## 2022-10-12 NOTE — PROGRESS NOTES
Assessment/Plan:    Osteoporosis without current pathological fracture  Background:  Patient is status post bisphosphonate therapy for 5 years now  Repeat DEXA scan shows similar levels of osteoporosis involving the left femoral neck and back   Improvement in the right femoral neck  last fracture 2 yrs ago ; XR: Acute mildly displaced fracture at the base of the 4th proximal phalanx and nondisplaced fracture at the base of the 5th proximal phalanx    ( result of trauma)  S/p ortho eval and PT  No surgx needed that time  Past medical history notable for hysterectomy on 2016 since then the patient have been on [facility] replacement therapy with high estrogen  Supplement      Follow-up DEXA scan on 10/22  Since a DXA study from 9/29/2020, there has been:  A  STATISTICALLY SIGNIFICANT DECREASE in bone mineral density of  -0 028 g/cm2 (-3 4)% in the hips  Plan:   -despite the patient completing a 5 year course on 10/22 of bisphosphonate and expressing been compliant to the medication still her osteoporosis is progressing at this time  Given her past medical history and current use of hormone replacement therapy could be the main cause of her current osteoporosis degree   -refer to endocrinology for further advice and recommendations at this time   -follow-up accordingly       Diagnoses and all orders for this visit:    Osteoporosis without current pathological fracture, unspecified osteoporosis type  -     Ambulatory Referral to Endocrinology; Future    Encounter for immunization  Comments:  Flu shot given  Orders:  -     influenza vaccine, high-dose, PF 0 7 mL (FLUZONE HIGH-DOSE)    Injury of low back, initial encounter  Comments:  Muscle sprain/strain on top of degenerative joint disorder  Less likely to be a fracture  Supportive care and physical therapy  Primary osteoarthritis of both hands  Comments:  Recommend stretching exercise    Follow-up accordingly          PHQ-2/9 Depression Screening Little interest or pleasure in doing things: 1 - several days  Feeling down, depressed, or hopeless: 1 - several days  PHQ-2 Score: 2  PHQ-2 Interpretation: Negative depression screen            Subjective:      Patient ID: Lazaro Torres is a 73 y o  female  74 YO  with past medical history notable for hypercholesteremia, osteoporosis ( complete 5 year course of bisphosphonate 10/22) , chronic hormone replacement therapy with high surgeon supplements per her provider presented to the office today for follow-up regarding osteoarthritis and osteoporosis  Today she is complaining of mild lower back pain as following       - last fracture 2 yrs ago ; XR: Acute mildly displaced fracture at the base of the 4th proximal phalanx and nondisplaced fracture at the base of the 5th proximal phalanx    ( result of trauma)  S/p ortho eval and PT  No surgx needed that time      -the patient is at high risk for fracture at this time; precautions and counseling an indication regarding ambulation and precautions from falling were given to the patient  Back Pain  This is a new problem  The current episode started 1 to 4 weeks ago  The problem occurs intermittently  The problem has been gradually improving since onset  The pain is present in the lumbar spine  The pain is at a severity of 3/10  The pain is mild  The symptoms are aggravated by bending, position and sitting  Pertinent negatives include no abdominal pain, bladder incontinence, bowel incontinence, chest pain, dysuria, fever, leg pain, numbness, paresis, pelvic pain, weakness or weight loss  Risk factors include history of osteoporosis and menopause  She has tried walking for the symptoms  The treatment provided moderate relief  The following portions of the patient's history were reviewed and updated as appropriate: allergies and problem list     Review of Systems   Constitutional: Negative for chills, fatigue, fever and weight loss  HENT: Negative  Negative for hearing loss  Eyes: Negative  Negative for visual disturbance  Respiratory: Negative for shortness of breath and wheezing  Cardiovascular: Negative for chest pain and palpitations  Gastrointestinal: Negative for abdominal pain, blood in stool, bowel incontinence, constipation, diarrhea, nausea and vomiting  Endocrine: Negative  Genitourinary: Negative for bladder incontinence, difficulty urinating, dysuria and pelvic pain  Musculoskeletal: Positive for back pain  Negative for arthralgias and myalgias  Skin: Negative  Allergic/Immunologic: Negative  Neurological: Negative for seizures, syncope, weakness and numbness  Hematological: Negative for adenopathy  Psychiatric/Behavioral: Negative  Objective:    /90 (BP Location: Left arm, Patient Position: Sitting, Cuff Size: Standard)   Pulse 62   Temp 97 5 °F (36 4 °C) (Tympanic)   Ht 5' 1" (1 549 m)   Wt 59 kg (130 lb)   SpO2 95%   BMI 24 56 kg/m²      Physical Exam  Vitals and nursing note reviewed  Constitutional:       General: She is not in acute distress  Appearance: Normal appearance  She is well-developed  She is not ill-appearing, toxic-appearing or diaphoretic  HENT:      Head: Normocephalic and atraumatic  Right Ear: Tympanic membrane, ear canal and external ear normal  There is no impacted cerumen  Left Ear: Tympanic membrane, ear canal and external ear normal  There is no impacted cerumen  Nose: Nose normal  No congestion or rhinorrhea  Mouth/Throat:      Mouth: Mucous membranes are moist       Pharynx: Oropharynx is clear  No oropharyngeal exudate or posterior oropharyngeal erythema  Eyes:      General: No scleral icterus  Right eye: No discharge  Left eye: No discharge  Conjunctiva/sclera: Conjunctivae normal       Pupils: Pupils are equal, round, and reactive to light  Cardiovascular:      Rate and Rhythm: Normal rate and regular rhythm  Pulses: Normal pulses  Heart sounds: Normal heart sounds  No murmur heard  Pulmonary:      Effort: Pulmonary effort is normal  No respiratory distress  Breath sounds: Normal breath sounds  No wheezing, rhonchi or rales  Abdominal:      General: Bowel sounds are normal  There is no distension  Palpations: Abdomen is soft  There is no mass  Tenderness: There is no abdominal tenderness  There is no guarding or rebound  Musculoskeletal:         General: Normal range of motion  Cervical back: Normal range of motion and neck supple  No rigidity  Right lower leg: No edema  Left lower leg: No edema  Lymphadenopathy:      Cervical: No cervical adenopathy  Skin:     General: Skin is warm and dry  Findings: No rash  Neurological:      General: No focal deficit present  Mental Status: She is alert and oriented to person, place, and time  Sensory: No sensory deficit  Motor: No weakness or abnormal muscle tone  Coordination: Coordination normal       Gait: Gait normal       Deep Tendon Reflexes: Reflexes are normal and symmetric  Psychiatric:         Mood and Affect: Mood normal          Behavior: Behavior normal          Thought Content:  Thought content normal          Judgment: Judgment normal

## 2022-10-12 NOTE — PATIENT INSTRUCTIONS
Please make sure to call the endocrinology office within next 2 days if they did not call to schedule an appointment

## 2022-10-17 NOTE — TELEPHONE ENCOUNTER
Unable to reach pt as her phone is muffled and I cant leave a message I see she has apt with talita in February I will wait for pt to call our office

## 2022-11-03 ENCOUNTER — OFFICE VISIT (OUTPATIENT)
Dept: ENDOCRINOLOGY | Facility: CLINIC | Age: 72
End: 2022-11-03

## 2022-11-03 VITALS
HEIGHT: 61 IN | WEIGHT: 131 LBS | DIASTOLIC BLOOD PRESSURE: 82 MMHG | HEART RATE: 96 BPM | BODY MASS INDEX: 24.73 KG/M2 | SYSTOLIC BLOOD PRESSURE: 118 MMHG

## 2022-11-03 DIAGNOSIS — E28.319 PREMATURE MENOPAUSE: ICD-10-CM

## 2022-11-03 DIAGNOSIS — Z79.83 LONG TERM (CURRENT) USE OF BISPHOSPHONATES: ICD-10-CM

## 2022-11-03 DIAGNOSIS — M81.0 OSTEOPOROSIS, UNSPECIFIED OSTEOPOROSIS TYPE, UNSPECIFIED PATHOLOGICAL FRACTURE PRESENCE: Primary | ICD-10-CM

## 2022-11-03 DIAGNOSIS — E03.9 HYPOTHYROIDISM, UNSPECIFIED TYPE: ICD-10-CM

## 2022-11-03 DIAGNOSIS — Z87.19 HISTORY OF DENTAL PROBLEMS: ICD-10-CM

## 2022-11-03 NOTE — PROGRESS NOTES
Ramsey Mohs 73 y o  female MRN: [id number]    Encounter: [id number]      Assessment/Plan     1  Osteoporosis - worsening of BMD on bisphosphonate  Most probable cause is early menopause  Consider alternate causes of osteoporosis vs non-compliance   Will send labs  Patient with significant fracture history  Will have to weight pros and cons of additional therapy in light of chronic bisphosphonate use  Increased adverse events can be assoc with prolonged bisphosphonate use, and patient does report >25y history of therapy (!!)  Patient also with active dental issues and possible need for more dental implants  2  Chronic bisphosphonate use - as above    3  Current dental problems     4  Premature menopause - on HRT prescribed by ob/gyn    5  Hypothyroidism - clinically and biochemically euthyroid  Continue present dosing of levothyroxine    Problem List Items Addressed This Visit    None     Visit Diagnoses     Osteoporosis, unspecified osteoporosis type, unspecified pathological fracture presence    -  Primary    Relevant Orders    Vitamin D 25 hydroxy Lab Collect    PTH, intact Lab Collect Lab Collect    Phosphorus Lab Collect    Comprehensive metabolic panel Lab Collect    Protein electrophoresis, serum Lab Collect    Protein electrophoresis, urine    C-Telopeptide          CC: Osteoporosis    History of Present Illness     HPI:    Edyta Keller presents for evaluation of osteoporosis  She is joined by her  who is contributing to elements of the history  Shell reports history of surgical menopause age 27 w hysterectomy and BSO 2/2 peritonitis  She has been on HRT ever since and remains on it to this day; prescribing doc is Dr Teto Caba (Ob/gyn)  She is on uncertain doses of estrogen and progesterone  She also reports longstanding bisphosphonate use  She is presently on monthly ibandronate 150 mg  She reports being on bisphosphonate therapy ever since 1999   She has taken this not true to schedule, but largely uninterrupted  She has been on alendronate as well  She has fracture history including fingers, ankle, shoulder and right leg  These fractures have always followed some sort of insult  She does also report fall tendency  She is not on calcium supplementation and calcium in diet seems poor, although she does drink milk occasionally  She takes Vit D 5k units daily  Her mother had a fracture at age 80y  No routine exercise, does mention swimming  Does have history of rectal prolapse, which limits participation in activities and is source of frustration  No history of systemic steroids  No history of RA  No significant EtOH, smoking  She does have dental history, including implants  She does take levothyroxine 50 mcg daily  She denies any hyper or hypothyroid symptoms  Review of Systems   Constitutional: Negative for diaphoresis and unexpected weight change  HENT: Positive for dental problem  Negative for trouble swallowing and voice change  Respiratory: Negative for shortness of breath  Cardiovascular: Negative for chest pain  Gastrointestinal: Negative for nausea and vomiting  Endocrine: Negative for polydipsia and polyuria  Psychiatric/Behavioral: Negative for agitation  All other systems reviewed and are negative  Historical Information   Past Medical History:   Diagnosis Date   • Allergic     Seasonal   • Arthritis ?    • Asthma    • Depression    • Disease of thyroid gland    • Diverticulitis of colon About 10 yrs ago    Severe   • Headache(784 0) When I was 17 yrs old    Migraines   • Hypercholesteremia    • Migraine    • Osteoporosis      Past Surgical History:   Procedure Laterality Date   • COLON SURGERY  2021    Prolapse rectim   • FINGER FRACTURE SURGERY     • FRACTURE SURGERY  2015    Lower left plate,9 screws,2 pins   • HYSTERECTOMY     • LEG SURGERY     • RECTAL PROLAPSE REPAIR     • TOTAL ABDOMINAL HYSTERECTOMY W/ BILATERAL SALPINGOOPHORECTOMY       Social History Social History     Substance and Sexual Activity   Alcohol Use Not Currently    Comment: social     Social History     Substance and Sexual Activity   Drug Use No     Social History     Tobacco Use   Smoking Status Former Smoker   Smokeless Tobacco Never Used     Family History:   Family History   Problem Relation Age of Onset   • Vision loss Mother    • Glaucoma Mother    • Cancer Father    • Asthma Father         Legionaries disease   • COPD Father         Legionnaires disease   • Hearing loss Father    • Ovarian cancer Maternal Grandmother    • No Known Problems Paternal Grandmother    • No Known Problems Paternal Aunt    • No Known Problems Paternal Aunt        Meds/Allergies   Current Outpatient Medications   Medication Sig Dispense Refill   • butalbital-acetaminophen-caffeine (FIORICET,ESGIC) -40 mg per tablet TAKE 1 TABLET BY MOUTH EVERY 6 (SIX) HOURS AS NEEDED FOR HEADACHES 60 tablet 0   • Cholecalciferol (VITAMIN D PO) Take by mouth     • Coenzyme Q10 (COQ10) 100 MG CAPS Take by mouth     • ezetimibe-simvastatin (VYTORIN) 10-40 mg per tablet TAKE 1 TABLET BY MOUTH DAILY AT BEDTIME 90 tablet 1   • fenofibrate micronized (ANTARA) 130 MG capsule TAKE 1 CAPSULE (130 MG TOTAL) BY MOUTH DAILY WITH BREAKFAST 90 capsule 1   • fluticasone (FLONASE) 50 mcg/act nasal spray      • levothyroxine 50 mcg tablet TAKE 1 TABLET (50 MCG TOTAL) BY MOUTH DAILY 90 tablet 1   • Magnesium 100 MG TABS Take by mouth     • Multiple Vitamin (MULTI-VITAMIN DAILY PO) once daily     • Omega-3 1000 MG CAPS Take by mouth     • phentermine (ADIPEX-P) 37 5 MG tablet Take 1 tablet (37 5 mg total) by mouth daily 90 tablet 0   • Potassium Gluconate 80 MG TABS Take by mouth     • Progesterone 100 MG CAPS Take 150 mg by mouth in the morning     • sertraline (ZOLOFT) 100 mg tablet Take 1 tablet (100 mg total) by mouth daily 30 tablet 6   • SUMAtriptan Succinate (IMITREX IJ) Inject as directed     • ibandronate (BONIVA) 150 MG tablet Take 1 tablet (150 mg total) by mouth every 30 (thirty) days (Patient not taking: Reported on 11/3/2022) 1 tablet 6   • naproxen (NAPROSYN) 500 mg tablet  (Patient not taking: No sig reported)     • Nutritional Supplements (DHEA PO) Take 10 mg by mouth in the morning (Patient not taking: Reported on 11/3/2022)     • valACYclovir (VALTREX) 1,000 mg tablet Take 1 tablet (1,000 mg total) by mouth 2 (two) times a day for 7 days 14 tablet 0     No current facility-administered medications for this visit  Allergies   Allergen Reactions   • Bee Venom    • Latex        Objective   Vitals: Blood pressure 118/82, pulse 96, height 5' 1" (1 549 m), weight 59 4 kg (131 lb), not currently breastfeeding  Physical Exam  Vitals reviewed  Constitutional:       Appearance: Normal appearance  HENT:      Head: Normocephalic and atraumatic  Nose: Nose normal    Eyes:      Conjunctiva/sclera: Conjunctivae normal    Cardiovascular:      Rate and Rhythm: Normal rate and regular rhythm  Pulmonary:      Effort: Pulmonary effort is normal  No respiratory distress  Musculoskeletal:      Cervical back: Normal range of motion  Skin:     General: Skin is warm and dry  Neurological:      General: No focal deficit present  Mental Status: She is alert  Psychiatric:         Mood and Affect: Mood normal          Behavior: Behavior normal          The history was obtained from the review of the chart, patient and family      Lab Results:   Lab Results   Component Value Date/Time    Potassium 3 8 06/10/2022 11:49 AM    Potassium 3 6 11/05/2021 03:03 PM    Chloride 111 (H) 06/10/2022 11:49 AM    Chloride 106 11/05/2021 03:03 PM    CO2 27 06/10/2022 11:49 AM    CO2 24 11/05/2021 03:03 PM    BUN 9 06/10/2022 11:49 AM    BUN 10 11/05/2021 03:03 PM    Creatinine 0 71 06/10/2022 11:49 AM    Creatinine 0 65 11/05/2021 03:03 PM    Glucose, Fasting 90 06/10/2022 11:49 AM    Glucose, Fasting 126 (H) 11/05/2021 03:03 PM    Calcium 9 6 06/10/2022 11:49 AM    Calcium 9 8 11/05/2021 03:03 PM    eGFR 87 06/10/2022 11:49 AM    eGFR 92 11/05/2021 03:03 PM    TSH 3RD GENERATON 1 120 06/10/2022 11:49 AM    TSH 3RD GENERATON 2 230 11/05/2021 03:03 PM    Free T4 1 04 07/08/2022 11:29 AM    Free T4 1 12 11/05/2021 03:03 PM    Vit D, 25-Hydroxy 29 8 (L) 11/05/2021 03:03 PM         Imaging Studies:         Results for orders placed during the hospital encounter of 10/03/22    DXA bone density spine hip and pelvis    Impression  1  Osteoporosis  2   Since a DXA study from 9/29/2020, there has been:  A  STATISTICALLY SIGNIFICANT DECREASE in bone mineral density of  -0 028 g/cm2 (-3 4)% in the hips  3   The 10 year risk of hip fracture is 6 2% with the 10 year risk of major osteoporotic fracture being 21 4% as calculated by the [facility]/WHO fracture risk assessment tool (FRAX)  4   The current NOF guidelines recommend treating patients with a T-score of -2 5 or less in the lumbar spine or hips, or in post-menopausal women and men over the age of 48 with low bone mass (osteopenia) and a FRAX 10 year risk score of >3% for hip  fracture and/or >20% for major osteoporotic fracture  5   The NOF recommends follow-up DXA in 1-2 years after initiating therapy for osteoporosis and every 2 years thereafter  More frequent evaluation is appropriate for patients with conditions associated with rapid bone loss, such as glucocorticoid  therapy  The interval between DXA screenings may be longer for individuals without major risk factors and initial T-score in the normal or upper low bone mass range  The FRAX algorithm has certain limitations:  -FRAX has not been validated in patients currently or previously treated with pharmacotherapy for osteoporosis  In such patients, clinical judgment must be exercised in interpreting FRAX scores    -Prior hip, vertebral and humeral fragility fractures appear to confer greater risk of subsequent fracture than fractures at other sites (this is especially true for individuals with severe vertebral fractures), but quantification of this incremental  risk is not possible with FRAX  -FRAX underestimates fracture risk in patients with history of multiple fragility fractures  -FRAX may underestimate fracture risk in patients with history of frequent falls   -It is not appropriate to use FRAX to monitor treatment response  WHO CLASSIFICATION:  Normal (a T-score of -1 0 or higher)  Low bone mineral density (a T-score of less than -1 0 but higher than -2 5)  Osteoporosis (a T-score of -2 5 or less)  Severe osteoporosis (a T-score of -2 5 or less with a fragility fracture)    LEAST SIGNIFICANT CHANGE AT 95% C I:  Lumbar spine: 0 030 gm/cm2 (2 5%)  Total hip: 0 015 gm/cm2 (1 7%)  Forearm: 0 034 gm/cm2 (5 2%)  Workstation performed: [id number]            I have personally reviewed pertinent reports  Portions of the record may have been created with voice recognition software  Occasional wrong word or "sound a like" substitutions may have occurred due to the inherent limitations of voice recognition software  Read the chart carefully and recognize, using context, where substitutions have occurred

## 2022-11-03 NOTE — PATIENT INSTRUCTIONS
Weight-bearing activity loads the skeleton and prevent bone loss, but modify your exercise (strength training, yoga, Pilates, etc) to protect your bones  Avoid forward flexion (rounding your back), extreme twisting, and extreme side bending  Do posture and balance exercises daily    Balance and Strength Training can prevent falls  Work with an exercise specialist or physical therapist to develop a program to improve upper-body and lower-body strength and balance  Daily calcium from food and supplements should = 1,000 - 1,200 mg  Read nutrition labels for calcium and vitamin D! On days that you eat 2-3 servings of dairy or calcium-rich foods, you may not need a calcium supplement  On days you don't eat 2 servings of calcium-rich foods, you may need a supplement  If you do not eat dairy foods, you need to find other calcium-rich foods or take a calcium supplement (only 500-600 mg at a time)    Vitamin D is needed for calcium to be absorbed  Few foods provide vitamin D  Exposure to the sun provides vitamin D, but it is not reliable or recommended  Take a vitamin D supplement to get 25 - 50 mcg (1,000 - 2,000 IU) per day  Smoking increases fracture risk  Having 3+ alcoholic drinks a day affects vitamin D levels, nutrition, and fall risk  Make Your Home Safe  Add grab bars and night lights, remove loose rugs, be aware of pets and other trip hazards, use handrails, watch where you put your feet, and don't multi-task         [facility] 2013; [id number]

## 2022-11-10 DIAGNOSIS — R63.4 WEIGHT LOSS: ICD-10-CM

## 2022-11-10 RX ORDER — PHENTERMINE HYDROCHLORIDE 37.5 MG/1
37.5 TABLET ORAL DAILY
Qty: 90 TABLET | Refills: 0 | Status: SHIPPED | OUTPATIENT
Start: 2022-11-10

## 2022-12-09 ENCOUNTER — HOSPITAL ENCOUNTER (OUTPATIENT)
Dept: MAMMOGRAPHY | Facility: HOSPITAL | Age: 72
End: 2022-12-09
Attending: OBSTETRICS & GYNECOLOGY

## 2022-12-09 VITALS — BODY MASS INDEX: 24.73 KG/M2 | WEIGHT: 131 LBS | HEIGHT: 61 IN

## 2022-12-09 DIAGNOSIS — Z12.31 ENCOUNTER FOR SCREENING MAMMOGRAM FOR MALIGNANT NEOPLASM OF BREAST: ICD-10-CM

## 2022-12-13 ENCOUNTER — ANNUAL EXAM (OUTPATIENT)
Dept: OBGYN CLINIC | Facility: MEDICAL CENTER | Age: 72
End: 2022-12-13

## 2022-12-13 VITALS
DIASTOLIC BLOOD PRESSURE: 82 MMHG | WEIGHT: 133 LBS | BODY MASS INDEX: 25.11 KG/M2 | SYSTOLIC BLOOD PRESSURE: 128 MMHG | HEIGHT: 61 IN

## 2022-12-13 DIAGNOSIS — Z01.419 ENCOUNTER FOR ANNUAL ROUTINE GYNECOLOGICAL EXAMINATION: Primary | ICD-10-CM

## 2022-12-13 DIAGNOSIS — Z12.31 ENCOUNTER FOR SCREENING MAMMOGRAM FOR BREAST CANCER: ICD-10-CM

## 2022-12-13 DIAGNOSIS — R15.1 FECAL SMEARING: ICD-10-CM

## 2022-12-13 NOTE — PROGRESS NOTES
ASSESSMENT & PLAN: Lorraine Welch is a 73 y o   with normal gynecologic exam     1   Routine well woman exam done today  2  Pap and HPV:  The patient's last pap and hpv was years ago , had hysterectomy    It was normal     Pap with cotesting was not done today  Current ASCCP Guidelines reviewed  3   Mammogram ordered  4  Colorectal cancer screening was notordered  5   The following were reviewed in today's visit: breast self exam, mammography screening ordered, exercise and healthy diet  Decreased libido - follows with Dr Jermaine Beckett had surgery for rectal prolapse but still having bulge and problems with BM     CC:  Annual Gynecologic Examination    HPI: Lorraine Welch is a 73 y o  Courtney Muse who presents for annual gynecologic examination  She has the following concerns:  Feeling bulge per rectum and having BM incontinence / wears  Depend      Health Maintenance:    She wears her seatbelt routinely  She does perform regular monthly self breast exams  She feels safe at home  Past Medical History:   Diagnosis Date   • Allergic     Seasonal   • Arthritis ?    • Asthma    • Depression    • Disease of thyroid gland    • Diverticulitis of colon About 10 yrs ago    Severe   • Headache(784 0) When I was 17 yrs old    Migraines   • Hypercholesteremia    • Migraine    • Osteoporosis        Past Surgical History:   Procedure Laterality Date   • COLON SURGERY      Prolapse rectim   • FINGER FRACTURE SURGERY     • FRACTURE SURGERY      Lower left plate,9 screws,2 pins   • HYSTERECTOMY     • LEG SURGERY     • RECTAL PROLAPSE REPAIR     • TOTAL ABDOMINAL HYSTERECTOMY W/ BILATERAL SALPINGOOPHORECTOMY         Past OB/Gyn History:  OB History        0    Para   0    Term   0       0    AB   0    Living   0       SAB   0    IAB   0    Ectopic   0    Multiple   0    Live Births   0               Family History   Problem Relation Age of Onset   • Vision loss Mother    • Glaucoma Mother    • Cancer Father    • Asthma Father         Legionaries disease   • COPD Father         Legionnaires disease   • Hearing loss Father    • Ovarian cancer Maternal Grandmother    • No Known Problems Paternal Grandmother    • No Known Problems Paternal Aunt    • No Known Problems Paternal Aunt        Social History:  Social History     Socioeconomic History   • Marital status: /Civil Union     Spouse name: Not on file   • Number of children: Not on file   • Years of education: Not on file   • Highest education level: Not on file   Occupational History   • Not on file   Tobacco Use   • Smoking status: Former   • Smokeless tobacco: Never   Vaping Use   • Vaping Use: Never used   Substance and Sexual Activity   • Alcohol use: Not Currently     Comment: social   • Drug use: No   • Sexual activity: Not Currently     Partners: Male   Other Topics Concern   • Not on file   Social History Narrative   • Not on file     Social Determinants of Health     Financial Resource Strain: Not on file   Food Insecurity: Not on file   Transportation Needs: Not on file   Physical Activity: Not on file   Stress: Not on file   Social Connections: Not on file   Intimate Partner Violence: Not on file   Housing Stability: Not on file         Allergies   Allergen Reactions   • Bee Venom    • Latex          Current Outpatient Medications:   •  butalbital-acetaminophen-caffeine (FIORICET,ESGIC) -40 mg per tablet, TAKE 1 TABLET BY MOUTH EVERY 6 (SIX) HOURS AS NEEDED FOR HEADACHES, Disp: 60 tablet, Rfl: 0  •  Cholecalciferol (VITAMIN D PO), Take by mouth, Disp: , Rfl:   •  Coenzyme Q10 (COQ10) 100 MG CAPS, Take by mouth, Disp: , Rfl:   •  ezetimibe-simvastatin (VYTORIN) 10-40 mg per tablet, TAKE 1 TABLET BY MOUTH DAILY AT BEDTIME, Disp: 90 tablet, Rfl: 1  •  fenofibrate micronized (ANTARA) 130 MG capsule, TAKE 1 CAPSULE (130 MG TOTAL) BY MOUTH DAILY WITH BREAKFAST, Disp: 90 capsule, Rfl: 1  •  fluticasone (FLONASE) 50 mcg/act nasal spray, , Disp: , Rfl:   •  levothyroxine 50 mcg tablet, TAKE 1 TABLET (50 MCG TOTAL) BY MOUTH DAILY, Disp: 90 tablet, Rfl: 1  •  Magnesium 100 MG TABS, Take by mouth, Disp: , Rfl:   •  Multiple Vitamin (MULTI-VITAMIN DAILY PO), once daily, Disp: , Rfl:   •  Omega-3 1000 MG CAPS, Take by mouth, Disp: , Rfl:   •  phentermine (ADIPEX-P) 37 5 MG tablet, Take 1 tablet (37 5 mg total) by mouth daily, Disp: 90 tablet, Rfl: 0  •  Potassium Gluconate 80 MG TABS, Take by mouth, Disp: , Rfl:   •  Progesterone 100 MG CAPS, Take 150 mg by mouth in the morning, Disp: , Rfl:   •  sertraline (ZOLOFT) 100 mg tablet, Take 1 tablet (100 mg total) by mouth daily, Disp: 30 tablet, Rfl: 6  •  SUMAtriptan Succinate (IMITREX IJ), Inject as directed, Disp: , Rfl:   •  valACYclovir (VALTREX) 1,000 mg tablet, Take 1 tablet (1,000 mg total) by mouth 2 (two) times a day for 7 days, Disp: 14 tablet, Rfl: 0  •  ibandronate (BONIVA) 150 MG tablet, Take 1 tablet (150 mg total) by mouth every 30 (thirty) days (Patient not taking: Reported on 11/3/2022), Disp: 1 tablet, Rfl: 6  •  naproxen (NAPROSYN) 500 mg tablet, , Disp: , Rfl:   •  Nutritional Supplements (DHEA PO), Take 10 mg by mouth in the morning (Patient not taking: Reported on 11/3/2022), Disp: , Rfl:     Review of Systems  Constitutional :no fever, feels well, no tiredness, no recent weight gain or loss  ENT: no ear ache, no loss of hearing, no nosebleeds or nasal discharge, no sore throat or hoarseness  Cardiovascular: no complaints of slow or fast heart beat, no chest pain, no palpitations, no leg claudication or lower extremity edema    Respiratory: no complaints of shortness of shortness of breath, no HUTCHISON  Breasts:no complaints of breast pain, breast lump, or nipple discharge  Gastrointestinal: no complaints of abdominal pain, constipation, nausea, vomiting, or diarrhea or bloody stools  Genitourinary : no complaints of dysuria, incontinence, pelvic pain, no dysmenorrhea, vaginal discharge or abnormal vaginal bleeding and as noted in HPI  Musculoskeletal: no complaints of arthralgia, no myalgia, no joint swelling or stiffness, no limb pain or swelling  Integumentary: no complaints of skin rash or lesion, itching or dry skin  Neurological: no complaints of headache, no confusion, no numbness or tingling, no dizziness or fainting    Objective      /82   Ht 5' 1" (1 549 m)   Wt 60 3 kg (133 lb)   BMI 25 13 kg/m²   General:   appears stated age, cooperative, alert normal mood and affect   Lungs: unlabored breathing    Breasts: normal appearance, no masses or tenderness   Abdomen: soft, non-tender, without masses or organomegaly   Vulva: normal   Vagina: normal vagina, no discharge, exudate, lesion, or erythema   Urethra: normal   Cervix: Absent     Uterus: uterus absent   Adnexa: no mass, fullness, tenderness   Psychiatric orientation to person, place, and time: normal  mood and affect: normal

## 2022-12-30 ENCOUNTER — APPOINTMENT (OUTPATIENT)
Dept: LAB | Facility: CLINIC | Age: 72
End: 2022-12-30

## 2022-12-30 DIAGNOSIS — E78.00 HYPERCHOLESTEREMIA: ICD-10-CM

## 2022-12-30 DIAGNOSIS — M81.0 OSTEOPOROSIS, UNSPECIFIED OSTEOPOROSIS TYPE, UNSPECIFIED PATHOLOGICAL FRACTURE PRESENCE: ICD-10-CM

## 2022-12-30 DIAGNOSIS — E03.9 ACQUIRED HYPOTHYROIDISM: ICD-10-CM

## 2022-12-30 DIAGNOSIS — G43.809 OTHER MIGRAINE WITHOUT STATUS MIGRAINOSUS, NOT INTRACTABLE: ICD-10-CM

## 2022-12-30 DIAGNOSIS — Z79.890 NEED FOR PROPHYLACTIC HORMONE REPLACEMENT THERAPY (POSTMENOPAUSAL): ICD-10-CM

## 2022-12-30 DIAGNOSIS — G43.001 MIGRAINE WITHOUT AURA AND WITH STATUS MIGRAINOSUS, NOT INTRACTABLE: ICD-10-CM

## 2022-12-30 DIAGNOSIS — M81.0 OSTEOPOROSIS, POST-MENOPAUSAL: ICD-10-CM

## 2022-12-30 DIAGNOSIS — N95.1 MENOPAUSAL SYNDROME: ICD-10-CM

## 2022-12-30 LAB
25(OH)D3 SERPL-MCNC: 22.1 NG/ML (ref 30–100)
ALBUMIN SERPL BCP-MCNC: 4 G/DL (ref 3.5–5)
ALP SERPL-CCNC: 68 U/L (ref 46–116)
ALT SERPL W P-5'-P-CCNC: 30 U/L (ref 12–78)
ANION GAP SERPL CALCULATED.3IONS-SCNC: 5 MMOL/L (ref 4–13)
AST SERPL W P-5'-P-CCNC: 22 U/L (ref 5–45)
BILIRUB SERPL-MCNC: 0.3 MG/DL (ref 0.2–1)
BUN SERPL-MCNC: 10 MG/DL (ref 5–25)
CALCIUM SERPL-MCNC: 10.4 MG/DL (ref 8.3–10.1)
CHLORIDE SERPL-SCNC: 110 MMOL/L (ref 96–108)
CO2 SERPL-SCNC: 28 MMOL/L (ref 21–32)
CORTIS SERPL-MCNC: 21 UG/DL
CREAT SERPL-MCNC: 0.78 MG/DL (ref 0.6–1.3)
ESTRADIOL SERPL-MCNC: 13 PG/ML
GFR SERPL CREATININE-BSD FRML MDRD: 77 ML/MIN/1.73SQ M
GLUCOSE P FAST SERPL-MCNC: 91 MG/DL (ref 65–99)
PHOSPHATE SERPL-MCNC: 2.9 MG/DL (ref 2.3–4.1)
POTASSIUM SERPL-SCNC: 4.5 MMOL/L (ref 3.5–5.3)
PROGEST SERPL-MCNC: <0.2 NG/ML
PROT SERPL-MCNC: 7.5 G/DL (ref 6.4–8.4)
PTH-INTACT SERPL-MCNC: 76.9 PG/ML (ref 18.4–80.1)
SODIUM SERPL-SCNC: 143 MMOL/L (ref 135–147)
T3FREE SERPL-MCNC: 2.84 PG/ML (ref 2.3–4.2)
T4 FREE SERPL-MCNC: 0.81 NG/DL (ref 0.76–1.46)
TSH SERPL DL<=0.05 MIU/L-ACNC: 2.47 UIU/ML (ref 0.45–4.5)
VIT B12 SERPL-MCNC: 1182 PG/ML (ref 100–900)

## 2022-12-31 LAB
DHEA-S SERPL-MCNC: 11.4 UG/DL (ref 20.4–186.6)
EST. AVERAGE GLUCOSE BLD GHB EST-MCNC: 105 MG/DL
HBA1C MFR BLD: 5.3 %
TESTOST FREE SERPL-MCNC: 2.1 PG/ML (ref 0–4.2)
TESTOST SERPL-MCNC: 15 NG/DL (ref 3–67)

## 2023-01-03 DIAGNOSIS — G43.809 OTHER MIGRAINE WITHOUT STATUS MIGRAINOSUS, NOT INTRACTABLE: ICD-10-CM

## 2023-01-03 LAB
ALBUMIN SERPL ELPH-MCNC: 4.35 G/DL (ref 3.5–5)
ALBUMIN SERPL ELPH-MCNC: 59.6 % (ref 52–65)
ALBUMIN UR ELPH-MCNC: 100 %
ALPHA1 GLOB MFR UR ELPH: 0 %
ALPHA1 GLOB SERPL ELPH-MCNC: 0.29 G/DL (ref 0.1–0.4)
ALPHA1 GLOB SERPL ELPH-MCNC: 4 % (ref 2.5–5)
ALPHA2 GLOB MFR UR ELPH: 0 %
ALPHA2 GLOB SERPL ELPH-MCNC: 0.81 G/DL (ref 0.4–1.2)
ALPHA2 GLOB SERPL ELPH-MCNC: 11.1 % (ref 7–13)
B-GLOBULIN MFR UR ELPH: 0 %
BETA GLOB ABNORMAL SERPL ELPH-MCNC: 0.39 G/DL (ref 0.4–0.8)
BETA1 GLOB SERPL ELPH-MCNC: 5.4 % (ref 5–13)
BETA2 GLOB SERPL ELPH-MCNC: 4.7 % (ref 2–8)
BETA2+GAMMA GLOB SERPL ELPH-MCNC: 0.34 G/DL (ref 0.2–0.5)
ESTRONE SERPL-MCNC: <6 PG/ML (ref 0–125)
GAMMA GLOB ABNORMAL SERPL ELPH-MCNC: 1.11 G/DL (ref 0.5–1.6)
GAMMA GLOB MFR UR ELPH: 0 %
GAMMA GLOB SERPL ELPH-MCNC: 15.2 % (ref 12–22)
IGG/ALB SER: 1.48 {RATIO} (ref 1.1–1.8)
PROT PATTERN SERPL ELPH-IMP: ABNORMAL
PROT PATTERN UR ELPH-IMP: ABNORMAL
PROT SERPL-MCNC: 7.3 G/DL (ref 6.4–8.2)
PROT UR-MCNC: 18 MG/DL

## 2023-01-04 ENCOUNTER — OFFICE VISIT (OUTPATIENT)
Dept: ENDOCRINOLOGY | Facility: CLINIC | Age: 73
End: 2023-01-04

## 2023-01-04 VITALS
DIASTOLIC BLOOD PRESSURE: 90 MMHG | BODY MASS INDEX: 25.3 KG/M2 | HEIGHT: 61 IN | HEART RATE: 107 BPM | SYSTOLIC BLOOD PRESSURE: 150 MMHG | WEIGHT: 134 LBS

## 2023-01-04 DIAGNOSIS — E83.52 HYPERCALCEMIA: Primary | ICD-10-CM

## 2023-01-04 DIAGNOSIS — M81.0 OSTEOPOROSIS, UNSPECIFIED OSTEOPOROSIS TYPE, UNSPECIFIED PATHOLOGICAL FRACTURE PRESENCE: ICD-10-CM

## 2023-01-04 DIAGNOSIS — E55.9 VITAMIN D INSUFFICIENCY: ICD-10-CM

## 2023-01-04 DIAGNOSIS — Z79.83 LONG TERM (CURRENT) USE OF BISPHOSPHONATES: ICD-10-CM

## 2023-01-04 DIAGNOSIS — Z87.19 HISTORY OF DENTAL PROBLEMS: ICD-10-CM

## 2023-01-04 DIAGNOSIS — E03.9 HYPOTHYROIDISM, UNSPECIFIED TYPE: ICD-10-CM

## 2023-01-04 LAB — COLLAGEN CTX SERPL-MCNC: 507 PG/ML

## 2023-01-04 NOTE — PROGRESS NOTES
Caridad Zamarripa 81 y o  female MRN: [id number]    Encounter: [id number]      Assessment/Plan     1  Osteoporosis with history of fracture   2  Chronic bisphosphonate use, reportedly >25y - off boniva x1 month  3  History of dental implants - no present issues  4  Premature menopause - on HRT prescribed by ob/gyn  HRT will affect CBG and thus serum cortisol testing  5  Hypothyroidism - clinically and biochemically euthyroid  Continue present dosing of levothyroxine  6  Vitamin D insufficiency   7  Hypercalcemia      Labs show hypercalcemia  Will pursue evaluation whether PTH mediated  Recommend increasing Vit D3 10K daily x4 weeks to correct Vit D insufficiency, also start calcium carbonate 600 mg bid in 2-weeks, then check 24h urine for calcium, cortisol followed by labwork as below  Moving forward, will have to consider bisphosphonate history in determining a therapeutic plan  Notably, CTX value higher than I would have expected given duration of bisphosphonate use, although single measurement has greater limitations than serial measurements on therapy  May want to consider use of anabolic agents, assuming not contraindicated by current parathyroid evaluation       Problem List Items Addressed This Visit    None  Visit Diagnoses     Hypercalcemia    -  Primary    Relevant Orders    PTH, intact Lab Collect Lab Collect    Vitamin D 25 hydroxy Lab Collect    Comprehensive metabolic panel Lab Collect    Phosphorus Lab Collect    Calcium, ionized    Creatinine, urine, 24 hour Lab Collect    DHEA-sulfate Lab Collect    Calcium, urine, 24 hour Lab Collect    Cortisol, Free, Urine, 24 Hour    Osteoporosis, unspecified osteoporosis type, unspecified pathological fracture presence        Relevant Orders    PTH, intact Lab Collect Lab Collect    Vitamin D 25 hydroxy Lab Collect    Comprehensive metabolic panel Lab Collect    Phosphorus Lab Collect    Calcium, ionized    Creatinine, urine, 24 hour Lab Collect DHEA-sulfate Lab Collect    Calcium, urine, 24 hour Lab Collect    Cortisol, Free, Urine, 24 Hour    Long term (current) use of bisphosphonates        History of dental problems        Hypothyroidism, unspecified type        Vitamin D insufficiency            RTC 4-6w    CC: Osteoporosis    History of Present Illness     HPI:    Melcandie Johnson returns today for follow up labs  She is joined again by her  who is contributing to elements of the history  No significant interval changes in health since last visit  She notes upcoming plan to [facility]  She continues to take Vit D3 5k units daily  She does also take levothyroxine 50 mcg daily  She denies any hyper or hypothyroid symptoms  Review of Systems   Constitutional: Negative for diaphoresis and unexpected weight change  HENT: Negative for trouble swallowing and voice change  Respiratory: Negative for shortness of breath  Cardiovascular: Negative for chest pain  Gastrointestinal: Negative for nausea and vomiting  Endocrine: Negative for polydipsia and polyuria  Psychiatric/Behavioral: Negative for agitation  All other systems reviewed and are negative  Historical Information   Past Medical History:   Diagnosis Date   • Allergic     Seasonal   • Arthritis ?    • Asthma    • Depression    • Disease of thyroid gland    • Diverticulitis of colon About 10 yrs ago    Severe   • Headache(784 0) When I was 17 yrs old    Migraines   • Hypercholesteremia    • Migraine    • Osteoporosis      Past Surgical History:   Procedure Laterality Date   • COLON SURGERY  2021    Prolapse rectim   • FINGER FRACTURE SURGERY     • FRACTURE SURGERY  2015    Lower left plate,9 screws,2 pins   • HYSTERECTOMY     • LEG SURGERY     • RECTAL PROLAPSE REPAIR     • TOTAL ABDOMINAL HYSTERECTOMY W/ BILATERAL SALPINGOOPHORECTOMY       Social History   Social History     Substance and Sexual Activity   Alcohol Use Not Currently    Comment: social     Social History Substance and Sexual Activity   Drug Use No     Social History     Tobacco Use   Smoking Status Former   Smokeless Tobacco Never     Family History:   Family History   Problem Relation Age of Onset   • Vision loss Mother    • Glaucoma Mother    • Cancer Father    • Asthma Father         Legionaries disease   • COPD Father         Legionnaires disease   • Hearing loss Father    • Ovarian cancer Maternal Grandmother    • No Known Problems Paternal Grandmother    • No Known Problems Paternal Aunt    • No Known Problems Paternal Aunt        Meds/Allergies   Current Outpatient Medications   Medication Sig Dispense Refill   • Cholecalciferol (VITAMIN D PO) Take 5,000 Units by mouth     • Coenzyme Q10 (COQ10) 100 MG CAPS Take by mouth     • ezetimibe-simvastatin (VYTORIN) 10-40 mg per tablet TAKE 1 TABLET BY MOUTH DAILY AT BEDTIME 90 tablet 1   • fenofibrate micronized (ANTARA) 130 MG capsule TAKE 1 CAPSULE (130 MG TOTAL) BY MOUTH DAILY WITH BREAKFAST 90 capsule 1   • fluticasone (FLONASE) 50 mcg/act nasal spray      • levothyroxine 50 mcg tablet TAKE 1 TABLET (50 MCG TOTAL) BY MOUTH DAILY 90 tablet 1   • Magnesium 100 MG TABS Take by mouth     • Multiple Vitamin (MULTI-VITAMIN DAILY PO) once daily     • Omega-3 1000 MG CAPS Take by mouth     • phentermine (ADIPEX-P) 37 5 MG tablet Take 1 tablet (37 5 mg total) by mouth daily 90 tablet 0   • Potassium Gluconate 80 MG TABS Take by mouth     • Progesterone 100 MG CAPS Take 150 mg by mouth in the morning     • sertraline (ZOLOFT) 100 mg tablet Take 1 tablet (100 mg total) by mouth daily 30 tablet 6   • SUMAtriptan Succinate (IMITREX IJ) Inject as directed     • butalbital-acetaminophen-caffeine (FIORICET,ESGIC) -40 mg per tablet TAKE 1 TABLET BY MOUTH EVERY 6 (SIX) HOURS AS NEEDED FOR HEADACHES 60 tablet 0   • ibandronate (BONIVA) 150 MG tablet Take 1 tablet (150 mg total) by mouth every 30 (thirty) days (Patient not taking: Reported on 11/3/2022) 1 tablet 6   • naproxen (NAPROSYN) 500 mg tablet  (Patient not taking: Reported on 7/6/2022)     • Nutritional Supplements (DHEA PO) Take 10 mg by mouth in the morning (Patient not taking: Reported on 11/3/2022)     • valACYclovir (VALTREX) 1,000 mg tablet Take 1 tablet (1,000 mg total) by mouth 2 (two) times a day for 7 days 14 tablet 0     No current facility-administered medications for this visit  Allergies   Allergen Reactions   • Bee Venom    • Latex        Objective   Vitals: Blood pressure 150/90, pulse (!) 107, height 5' 1" (1 549 m), weight 60 8 kg (134 lb), not currently breastfeeding  Physical Exam  Vitals reviewed  Constitutional:       Appearance: Normal appearance  HENT:      Head: Normocephalic and atraumatic  Nose: Nose normal    Eyes:      Conjunctiva/sclera: Conjunctivae normal    Pulmonary:      Effort: Pulmonary effort is normal  No respiratory distress  Skin:     General: Skin is warm and dry  Neurological:      General: No focal deficit present  Mental Status: She is alert  Psychiatric:         Mood and Affect: Mood normal          Behavior: Behavior normal          The history was obtained from the review of the chart, patient and family      Lab Results:     Component      Latest Ref Rng & Units 12/30/2022 12/30/2022          12:42 PM 12:42 PM   Sodium      135 - 147 mmol/L 143    Potassium      3 5 - 5 3 mmol/L 4 5    Chloride      96 - 108 mmol/L 110 (H)    CO2      21 - 32 mmol/L 28    Anion Gap      4 - 13 mmol/L 5    BUN      5 - 25 mg/dL 10    Creatinine      0 60 - 1 30 mg/dL 0 78    GLUCOSE FASTING      65 - 99 mg/dL 91    Calcium      8 3 - 10 1 mg/dL 10 4 (H)    AST      5 - 45 U/L 22    ALT      12 - 78 U/L 30    Alkaline Phosphatase      46 - 116 U/L 68    Total Protein      6 4 - 8 4 g/dL 7 5 7 3   Albumin      3 5 - 5 0 g/dL 4 0    TOTAL BILIRUBIN      0 20 - 1 00 mg/dL 0 30    eGFR      ml/min/1 73sq m 77    Albumin/Globulin Ratio      1 10 - 1 80  1 48   ALBUMIN ELP      52 0 - 65 0 %  59 6   ALBUMIN CONC ELP      3 50 - 5 00 g/dl  4 35   ALPHA 1      2 5 - 5 0 %  4 0   ALPHA 1 CONC ELP      0 10 - 0 40 g/dL  0 29   ALPHA 2      7 0 - 13 0 %  11 1   ALPHA 2 CONC ELP      0 40 - 1 20 g/dL  0 81   BETA-1      5 0 - 13 0 %  5 4   BETA 1 CONC ELP      0 40 - 0 80 g/dL  0 39 (L)   BETA-2      2 0 - 8 0 %  4 7   BETA 2 CONC ELP      0 20 - 0 50 g/dL  0 34   GAMMA GLOBULIN      12 0 - 22 0 %  15 2   GAMMA CONC ELP      0 50 - 1 60 g/dL  1 11   SPEP INTERPRETATION        See Comment   Urine Protein      2 0 - 17 5 mg/dL 18 0 (H)    ALBUMIN URINE ELP      % 100 0    ALPHA 1 URINE      % 0 0    ALPHA 2 URINE      % 0 0    BETA URINE      % 0 0    GAMMA GLOBULIN URINE      % 0 0    UPEP INTERPRETATION       See Comment    Hemoglobin A1C      Normal 3 8-5 6%; PreDiabetic 5 7-6 4%; Diabetic >=6 5%; Glycemic control for adults with diabetes <7 0% % 5 3    eAG, EST AVG Glucose      mg/dl 105    TESTOSTERONE FREE      0 0 - 4 2 pg/mL 2 1    Testosterone, Total, LC/MS      3 - 67 ng/dL 15    Vit D, 25-Hydroxy      30 0 - 100 0 ng/mL 22 1 (L)    PARATHYROID HORMONE      18 4 - 80 1 pg/mL 76 9    Phosphorus      2 3 - 4 1 mg/dL 2 9    C-TELOPEPTIDE,SERUM      pg/mL 507    Cortisol, Random      ug/dL 21 0    DHEA-SO4      20 4 - 186 6 ug/dL 11 4 (L)    PROGESTERONE LEVEL      ng/mL <0 20 (L)    ESTRADIOL LEVEL      pg/mL 13 0    ESTRONE      0 - 125 pg/mL <6    TSH 3RD GENERATON      0 450 - 4 500 uIU/mL 2 470    T3, Free      2 30 - 4 20 pg/mL 2 84    Free T4      0 76 - 1 46 ng/dL 0 81          Imaging Studies:   ?  ?  ? Results for orders placed during the hospital encounter of 10/03/22    DXA bone density spine hip and pelvis    Impression  1  Osteoporosis  2   Since a DXA study from 9/29/2020, there has been:  A  STATISTICALLY SIGNIFICANT DECREASE in bone mineral density of  -0 028 g/cm2 (-3 4)% in the hips          3   The 10 year risk of hip fracture is 6 2% with the 10 year risk of major osteoporotic fracture being 21 4% as calculated by the [employer]/WHO fracture risk assessment tool (FRAX)  4   The current NOF guidelines recommend treating patients with a T-score of -2 5 or less in the lumbar spine or hips, or in post-menopausal women and men over the age of 48 with low bone mass (osteopenia) and a FRAX 10 year risk score of >3% for hip  fracture and/or >20% for major osteoporotic fracture  5   The NOF recommends follow-up DXA in 1-2 years after initiating therapy for osteoporosis and every 2 years thereafter  More frequent evaluation is appropriate for patients with conditions associated with rapid bone loss, such as glucocorticoid  therapy  The interval between DXA screenings may be longer for individuals without major risk factors and initial T-score in the normal or upper low bone mass range  The FRAX algorithm has certain limitations:  -FRAX has not been validated in patients currently or previously treated with pharmacotherapy for osteoporosis  In such patients, clinical judgment must be exercised in interpreting FRAX scores  -Prior hip, vertebral and humeral fragility fractures appear to confer greater risk of subsequent fracture than fractures at other sites (this is especially true for individuals with severe vertebral fractures), but quantification of this incremental  risk is not possible with FRAX  -FRAX underestimates fracture risk in patients with history of multiple fragility fractures  -FRAX may underestimate fracture risk in patients with history of frequent falls   -It is not appropriate to use FRAX to monitor treatment response        WHO CLASSIFICATION:  Normal (a T-score of -1 0 or higher)  Low bone mineral density (a T-score of less than -1 0 but higher than -2 5)  Osteoporosis (a T-score of -2 5 or less)  Severe osteoporosis (a T-score of -2 5 or less with a fragility fracture)    LEAST SIGNIFICANT CHANGE AT 95% C I:  Lumbar spine: 0 030 gm/cm2 (2 5%)  Total hip: 0 015 gm/cm2 (1 7%)  Forearm: 0 034 gm/cm2 (5 2%)  Workstation performed: [id number]    ?  ?  ? I have personally reviewed pertinent reports  Portions of the record may have been created with voice recognition software  Occasional wrong word or "sound a like" substitutions may have occurred due to the inherent limitations of voice recognition software  Read the chart carefully and recognize, using context, where substitutions have occurred

## 2023-01-04 NOTE — PATIENT INSTRUCTIONS
Increase Vitamin D 10,000 units daily    Start Calcium carbonate 600 mg twice a day in 2-weeks    24 hour urine collection for calcium and cortisol then labs in 4-weeks    INSTRUCTIONS FOR 24 HOUR URINE COLLECTION     The purpose of this test is to measure the total amount of either hormones or minerals that your body excretes into your urine within a 24 hour period  To do this, you need to collect all the urine that your body makes for 24 hours  1         On the first day, when you wake up, note the time  Then go to the bathroom and urinate  This should be flushed down the toilet  It is not part of the collection  2         On the first day, all subsequent urine voids need to be saved in the urine jug  Save the entire amount of each urine void  3         When you go to sleep that night, if you happen to awaken throughout the night and early morning, those urine voids must also be saved  4         On the second morning, awaken at the same time as you did on the first morning and go to the bathroom and urinate  Keep this entire urine void  This is the final part of the collection  5         After you have finished the urine collection, keep it cool until you bring it into the laboratory

## 2023-01-05 RX ORDER — BUTALBITAL, ACETAMINOPHEN AND CAFFEINE 50; 325; 40 MG/1; MG/1; MG/1
1 TABLET ORAL EVERY 6 HOURS PRN
Qty: 60 TABLET | Refills: 0 | Status: SHIPPED | OUTPATIENT
Start: 2023-01-05

## 2023-01-09 RX ORDER — BUTALBITAL, ACETAMINOPHEN AND CAFFEINE 50; 325; 40 MG/1; MG/1; MG/1
1 TABLET ORAL EVERY 6 HOURS PRN
Qty: 60 TABLET | Refills: 0 | OUTPATIENT
Start: 2023-01-09

## 2023-02-17 ENCOUNTER — APPOINTMENT (OUTPATIENT)
Dept: LAB | Facility: CLINIC | Age: 73
End: 2023-02-17

## 2023-02-17 DIAGNOSIS — E83.52 HYPERCALCEMIA: ICD-10-CM

## 2023-02-17 DIAGNOSIS — M81.0 OSTEOPOROSIS, UNSPECIFIED OSTEOPOROSIS TYPE, UNSPECIFIED PATHOLOGICAL FRACTURE PRESENCE: ICD-10-CM

## 2023-02-17 LAB
25(OH)D3 SERPL-MCNC: 45.6 NG/ML (ref 30–100)
ALBUMIN SERPL BCP-MCNC: 4 G/DL (ref 3.5–5)
ALP SERPL-CCNC: 57 U/L (ref 46–116)
ALT SERPL W P-5'-P-CCNC: 30 U/L (ref 12–78)
ANION GAP SERPL CALCULATED.3IONS-SCNC: 5 MMOL/L (ref 4–13)
AST SERPL W P-5'-P-CCNC: 26 U/L (ref 5–45)
BILIRUB SERPL-MCNC: 0.31 MG/DL (ref 0.2–1)
BUN SERPL-MCNC: 9 MG/DL (ref 5–25)
CA-I BLD-SCNC: 1.33 MMOL/L (ref 1.12–1.32)
CALCIUM SERPL-MCNC: 10.2 MG/DL (ref 8.3–10.1)
CHLORIDE SERPL-SCNC: 107 MMOL/L (ref 96–108)
CO2 SERPL-SCNC: 27 MMOL/L (ref 21–32)
CREAT SERPL-MCNC: 0.79 MG/DL (ref 0.6–1.3)
GFR SERPL CREATININE-BSD FRML MDRD: 76 ML/MIN/1.73SQ M
GLUCOSE SERPL-MCNC: 87 MG/DL (ref 65–140)
PHOSPHATE SERPL-MCNC: 2.4 MG/DL (ref 2.3–4.1)
POTASSIUM SERPL-SCNC: 3.6 MMOL/L (ref 3.5–5.3)
PROT SERPL-MCNC: 7.5 G/DL (ref 6.4–8.4)
PTH-INTACT SERPL-MCNC: 40.5 PG/ML (ref 18.4–80.1)
SODIUM SERPL-SCNC: 139 MMOL/L (ref 135–147)

## 2023-02-18 LAB
CREAT 24H UR-MRATE: 0.8 G/24HR (ref 0.6–1.8)
DHEA-S SERPL-MCNC: 21.6 UG/DL (ref 20.4–186.6)
SPECIMEN VOL UR: 1700 ML

## 2023-02-19 LAB
CALCIUM 24H UR-MCNC: 153 MG/24 HRS (ref 42–353)
SPECIMEN VOL UR: 1700 ML

## 2023-02-22 LAB
CORTIS F 24H UR-MRATE: 32 UG/24 HR (ref 6–42)
CORTIS F UR-MCNC: 19 UG/L

## 2023-02-24 ENCOUNTER — OFFICE VISIT (OUTPATIENT)
Dept: ENDOCRINOLOGY | Facility: CLINIC | Age: 73
End: 2023-02-24

## 2023-02-24 ENCOUNTER — TELEPHONE (OUTPATIENT)
Dept: INFUSION CENTER | Facility: HOSPITAL | Age: 73
End: 2023-02-24

## 2023-02-24 VITALS
DIASTOLIC BLOOD PRESSURE: 110 MMHG | HEART RATE: 55 BPM | WEIGHT: 132.2 LBS | BODY MASS INDEX: 24.96 KG/M2 | SYSTOLIC BLOOD PRESSURE: 158 MMHG | HEIGHT: 61 IN

## 2023-02-24 DIAGNOSIS — E03.9 HYPOTHYROIDISM, UNSPECIFIED TYPE: ICD-10-CM

## 2023-02-24 DIAGNOSIS — M81.0 OSTEOPOROSIS WITHOUT CURRENT PATHOLOGICAL FRACTURE, UNSPECIFIED OSTEOPOROSIS TYPE: ICD-10-CM

## 2023-02-24 DIAGNOSIS — E55.9 VITAMIN D INSUFFICIENCY: ICD-10-CM

## 2023-02-24 DIAGNOSIS — M81.0 OSTEOPOROSIS, UNSPECIFIED OSTEOPOROSIS TYPE, UNSPECIFIED PATHOLOGICAL FRACTURE PRESENCE: Primary | ICD-10-CM

## 2023-02-24 RX ORDER — SODIUM CHLORIDE 9 MG/ML
20 INJECTION, SOLUTION INTRAVENOUS ONCE
OUTPATIENT
Start: 2023-04-24

## 2023-02-24 RX ORDER — ZOLEDRONIC ACID 5 MG/100ML
5 INJECTION, SOLUTION INTRAVENOUS ONCE
OUTPATIENT
Start: 2023-04-24

## 2023-02-24 NOTE — PROGRESS NOTES
Zabrina Tijerina 81 y o  female MRN: [id number]    Encounter: [id number]      Assessment/Plan     1  Osteoporosis with history of fractures   2  History of chronic bisphosphonate use  3  History of dental implants - inactive  4  Premature menopause - on HRT prescribed by ob/gyn  HRT will affect CBG and thus serum cortisol testing  5  Hypothyroidism - clinically and biochemically euthyroid  Continue present dosing of levothyroxine  6  Vitamin D insufficiency - resolved  Continue vit D3 therapy  7  Hypercalcemia - mild  Complicated case  Shell reports long-term bisphosphonate use  Her DXA testing overtime suggests ongoing deterioration of BMD (most recently statistically significant decreasing in hip) and her BTM (CTX) was 507 pg/mL, which is above median premenopausal range (280 pg/mL), which may either indicate failure with oral bisphosphonate (versus poor adherence) versus secondary cause  Secondary eval showed normal levels of 24 hour urine cortisol and SPEP wnl; her 24 hour urine calcium levels were acceptable  She has mid-normal levels of PTH, normal Vit D, low normal phos, and minimally elevated serum and ionized calcium  I cannot exclude the possibility of hyperparathyroidism on these findings, which I explained to Claudia and her   I may consider pursuit of parathyroid imaging for completeness  Today, we discussed pursuing treatment with IV reclast, which patient is agreeable to pursuing  I explained the potential concern for ONJ and AFF with prolonged bisphosphonate therapy  Claudia has significant disease affecting lumbar spine & is a high fracture risk  Zoledronic acid has excellent fracture risk reduction data particularly in spine  I discussed side effects of reclast, including flu-like reaction  I would prefer to take a year by year approach to looking at this therapy in light of her prior reported bisphosphonate history   We will plan to arrange first treatment in 2-months, after she has sufficient time to recover from her upcoming procedure  Problem List Items Addressed This Visit        Musculoskeletal and Integument    Osteoporosis without current pathological fracture   Other Visit Diagnoses     Osteoporosis, unspecified osteoporosis type, unspecified pathological fracture presence    -  Primary    Relevant Orders    C-Telopeptide    Comprehensive metabolic panel Lab Collect    PTH, intact Lab Collect Lab Collect    Phosphorus Lab Collect    Calcium, ionized    Vitamin D 25 hydroxy Lab Collect    Hypothyroidism, unspecified type        Vitamin D insufficiency            RTC 6-8 mo    CC: Osteoporosis    History of Present Illness     HPI:    Ruth Marley returns today for follow up labs  She is joined again by her  who is contributing to elements of the history  No significant interval changes in health since last visit  She recently saw Dr Kenneth Verde at [facility] for management of rectal prolapse  She is scheduled for surgery next month  She is taking calcium supplementation 600 mg bid and continues to take Vit D3 5k units daily  She does also take levothyroxine 50 mcg daily  She denies any hyper or hypothyroid symptoms  Review of Systems   Constitutional: Negative for diaphoresis and unexpected weight change  HENT: Negative for trouble swallowing and voice change  Respiratory: Negative for shortness of breath  Cardiovascular: Negative for chest pain  Gastrointestinal: Negative for nausea and vomiting  Endocrine: Negative for polydipsia and polyuria  Psychiatric/Behavioral: Negative for agitation  All other systems reviewed and are negative  Historical Information   Past Medical History:   Diagnosis Date   • Allergic     Seasonal   • Arthritis ?    • Asthma    • Depression    • Disease of thyroid gland    • Diverticulitis of colon About 10 yrs ago    Severe   • Headache(784 0) When I was 17 yrs old    Migraines   • Hypercholesteremia    • Migraine    • Osteoporosis      Past Surgical History:   Procedure Laterality Date   • COLON SURGERY  2021    Prolapse rectim   • FINGER FRACTURE SURGERY     • FRACTURE SURGERY  2015    Lower left plate,9 screws,2 pins   • HYSTERECTOMY     • LEG SURGERY     • RECTAL PROLAPSE REPAIR     • TOTAL ABDOMINAL HYSTERECTOMY W/ BILATERAL SALPINGOOPHORECTOMY       Social History   Social History     Substance and Sexual Activity   Alcohol Use Not Currently    Comment: social     Social History     Substance and Sexual Activity   Drug Use No     Social History     Tobacco Use   Smoking Status Former   Smokeless Tobacco Never     Family History:   Family History   Problem Relation Age of Onset   • Vision loss Mother    • Glaucoma Mother    • Cancer Father    • Asthma Father         Legionaries disease   • COPD Father         Legionnaires disease   • Hearing loss Father    • Ovarian cancer Maternal Grandmother    • No Known Problems Paternal Grandmother    • No Known Problems Paternal Aunt    • No Known Problems Paternal Aunt        Meds/Allergies   Current Outpatient Medications   Medication Sig Dispense Refill   • butalbital-acetaminophen-caffeine (FIORICET,ESGIC) -40 mg per tablet TAKE 1 TABLET BY MOUTH EVERY 6 (SIX) HOURS AS NEEDED FOR HEADACHES 60 tablet 0   • Cholecalciferol (VITAMIN D PO) Take 5,000 Units by mouth     • Coenzyme Q10 (COQ10) 100 MG CAPS Take by mouth     • ezetimibe-simvastatin (VYTORIN) 10-40 mg per tablet TAKE 1 TABLET BY MOUTH DAILY AT BEDTIME 90 tablet 1   • fenofibrate micronized (ANTARA) 130 MG capsule TAKE 1 CAPSULE (130 MG TOTAL) BY MOUTH DAILY WITH BREAKFAST 90 capsule 1   • fluticasone (FLONASE) 50 mcg/act nasal spray      • levothyroxine 50 mcg tablet TAKE 1 TABLET (50 MCG TOTAL) BY MOUTH DAILY 90 tablet 1   • Magnesium 100 MG TABS Take by mouth     • Multiple Vitamin (MULTI-VITAMIN DAILY PO) once daily     • Omega-3 1000 MG CAPS Take by mouth     • phentermine (ADIPEX-P) 37 5 MG tablet Take 1 tablet (37 5 mg total) by mouth daily 90 tablet 0   • Potassium Gluconate 80 MG TABS Take by mouth     • Progesterone 100 MG CAPS Take 150 mg by mouth in the morning     • sertraline (ZOLOFT) 100 mg tablet Take 1 tablet (100 mg total) by mouth daily 30 tablet 6   • SUMAtriptan Succinate (IMITREX IJ) Inject as directed     • valACYclovir (VALTREX) 1,000 mg tablet Take 1 tablet (1,000 mg total) by mouth 2 (two) times a day for 7 days 14 tablet 0   • ibandronate (BONIVA) 150 MG tablet Take 1 tablet (150 mg total) by mouth every 30 (thirty) days (Patient not taking: Reported on 11/3/2022) 1 tablet 6   • naproxen (NAPROSYN) 500 mg tablet  (Patient not taking: Reported on 7/6/2022)     • Nutritional Supplements (DHEA PO) Take 10 mg by mouth in the morning (Patient not taking: Reported on 11/3/2022)       No current facility-administered medications for this visit  Allergies   Allergen Reactions   • Bee Venom    • Latex        Objective   Vitals: Blood pressure (!) 158/110, pulse 55, height 5' 1" (1 549 m), weight 60 kg (132 lb 3 2 oz), not currently breastfeeding  Physical Exam  Vitals reviewed  Constitutional:       Appearance: Normal appearance  HENT:      Head: Normocephalic and atraumatic  Nose: Nose normal    Eyes:      Conjunctiva/sclera: Conjunctivae normal    Pulmonary:      Effort: Pulmonary effort is normal  No respiratory distress  Skin:     General: Skin is warm and dry  Neurological:      General: No focal deficit present  Mental Status: She is alert  Psychiatric:         Mood and Affect: Mood normal          Behavior: Behavior normal          The history was obtained from the review of the chart, patient and family      Lab Results:     Component      Latest Ref Rng & Units 2/17/2023   Sodium      135 - 147 mmol/L 139   Potassium      3 5 - 5 3 mmol/L 3 6   Chloride      96 - 108 mmol/L 107   CO2      21 - 32 mmol/L 27   Anion Gap      4 - 13 mmol/L 5   BUN      5 - 25 mg/dL 9 Creatinine      0 60 - 1 30 mg/dL 0 79   Glucose, Random      65 - 140 mg/dL 87   Calcium      8 3 - 10 1 mg/dL 10 2 (H)   AST      5 - 45 U/L 26   ALT      12 - 78 U/L 30   Alkaline Phosphatase      46 - 116 U/L 57   Total Protein      6 4 - 8 4 g/dL 7 5   Albumin      3 5 - 5 0 g/dL 4 0   TOTAL BILIRUBIN      0 20 - 1 00 mg/dL 0 31   eGFR      ml/min/1 73sq m 76   CREATININE 24 HOUR UR      0 6 - 1 8 g/24Hr 0 8   TOTAL URINE VOLUME      ml 1,700   24 HR URINE VOLUME      mL 1,700   CALCIUM 24 HOUR URINE      42 - 353 mg/24 hrs 153   CORTISOL,F,UG/L,U      Undefined ug/L 19   CORTISOL 24H URINARY FREE      6 - 42 ug/24 hr 32   PARATHYROID HORMONE      18 4 - 80 1 pg/mL 40 5   Vit D, 25-Hydroxy      30 0 - 100 0 ng/mL 45 6   Phosphorus      2 3 - 4 1 mg/dL 2 4   Calcium, Ionized      1 12 - 1 32 mmol/L 1 33 (H)   DHEA-SO4      20 4 - 186 6 ug/dL 21 6     FeCa 0 01    Component      Latest Ref Rng & Units 12/30/2022 12/30/2022          12:42 PM 12:42 PM   Sodium      135 - 147 mmol/L 143    Potassium      3 5 - 5 3 mmol/L 4 5    Chloride      96 - 108 mmol/L 110 (H)    CO2      21 - 32 mmol/L 28    Anion Gap      4 - 13 mmol/L 5    BUN      5 - 25 mg/dL 10    Creatinine      0 60 - 1 30 mg/dL 0 78    GLUCOSE FASTING      65 - 99 mg/dL 91    Calcium      8 3 - 10 1 mg/dL 10 4 (H)    AST      5 - 45 U/L 22    ALT      12 - 78 U/L 30    Alkaline Phosphatase      46 - 116 U/L 68    Total Protein      6 4 - 8 4 g/dL 7 5 7 3   Albumin      3 5 - 5 0 g/dL 4 0    TOTAL BILIRUBIN      0 20 - 1 00 mg/dL 0 30    eGFR      ml/min/1 73sq m 77    Albumin/Globulin Ratio      1 10 - 1 80  1 48   ALBUMIN ELP      52 0 - 65 0 %  59 6   ALBUMIN CONC ELP      3 50 - 5 00 g/dl  4 35   ALPHA 1      2 5 - 5 0 %  4 0   ALPHA 1 CONC ELP      0 10 - 0 40 g/dL  0 29   ALPHA 2      7 0 - 13 0 %  11 1   ALPHA 2 CONC ELP      0 40 - 1 20 g/dL  0 81   BETA-1      5 0 - 13 0 %  5 4   BETA 1 CONC ELP      0 40 - 0 80 g/dL  0 39 (L)   BETA-2 2 0 - 8 0 %  4 7   BETA 2 CONC ELP      0 20 - 0 50 g/dL  0 34   GAMMA GLOBULIN      12 0 - 22 0 %  15 2   GAMMA CONC ELP      0 50 - 1 60 g/dL  1 11   SPEP INTERPRETATION        See Comment   Urine Protein      2 0 - 17 5 mg/dL 18 0 (H)    ALBUMIN URINE ELP      % 100 0    ALPHA 1 URINE      % 0 0    ALPHA 2 URINE      % 0 0    BETA URINE      % 0 0    GAMMA GLOBULIN URINE      % 0 0    UPEP INTERPRETATION       See Comment    Hemoglobin A1C      Normal 3 8-5 6%; PreDiabetic 5 7-6 4%; Diabetic >=6 5%; Glycemic control for adults with diabetes <7 0% % 5 3    eAG, EST AVG Glucose      mg/dl 105    TESTOSTERONE FREE      0 0 - 4 2 pg/mL 2 1    Testosterone, Total, LC/MS      3 - 67 ng/dL 15    Vit D, 25-Hydroxy      30 0 - 100 0 ng/mL 22 1 (L)    PARATHYROID HORMONE      18 4 - 80 1 pg/mL 76 9    Phosphorus      2 3 - 4 1 mg/dL 2 9    C-TELOPEPTIDE,SERUM      pg/mL 507    Cortisol, Random      ug/dL 21 0    DHEA-SO4      20 4 - 186 6 ug/dL 11 4 (L)    PROGESTERONE LEVEL      ng/mL <0 20 (L)    ESTRADIOL LEVEL      pg/mL 13 0    ESTRONE      0 - 125 pg/mL <6    TSH 3RD GENERATON      0 450 - 4 500 uIU/mL 2 470    T3, Free      2 30 - 4 20 pg/mL 2 84    Free T4      0 76 - 1 46 ng/dL 0 81          Imaging Studies:   ?  ?  ? Results for orders placed during the hospital encounter of 10/03/22    DXA bone density spine hip and pelvis    Impression  1  Osteoporosis  2   Since a DXA study from 9/29/2020, there has been:  A  STATISTICALLY SIGNIFICANT DECREASE in bone mineral density of  -0 028 g/cm2 (-3 4)% in the hips  3   The 10 year risk of hip fracture is 6 2% with the 10 year risk of major osteoporotic fracture being 21 4% as calculated by the [facility]/WHO fracture risk assessment tool (FRAX)      4   The current NOF guidelines recommend treating patients with a T-score of -2 5 or less in the lumbar spine or hips, or in post-menopausal women and men over the age of 48 with low bone mass (osteopenia) and a FRAX 10 year risk score of >3% for hip  fracture and/or >20% for major osteoporotic fracture  5   The NOF recommends follow-up DXA in 1-2 years after initiating therapy for osteoporosis and every 2 years thereafter  More frequent evaluation is appropriate for patients with conditions associated with rapid bone loss, such as glucocorticoid  therapy  The interval between DXA screenings may be longer for individuals without major risk factors and initial T-score in the normal or upper low bone mass range  The FRAX algorithm has certain limitations:  -FRAX has not been validated in patients currently or previously treated with pharmacotherapy for osteoporosis  In such patients, clinical judgment must be exercised in interpreting FRAX scores  -Prior hip, vertebral and humeral fragility fractures appear to confer greater risk of subsequent fracture than fractures at other sites (this is especially true for individuals with severe vertebral fractures), but quantification of this incremental  risk is not possible with FRAX  -FRAX underestimates fracture risk in patients with history of multiple fragility fractures  -FRAX may underestimate fracture risk in patients with history of frequent falls   -It is not appropriate to use FRAX to monitor treatment response  WHO CLASSIFICATION:  Normal (a T-score of -1 0 or higher)  Low bone mineral density (a T-score of less than -1 0 but higher than -2 5)  Osteoporosis (a T-score of -2 5 or less)  Severe osteoporosis (a T-score of -2 5 or less with a fragility fracture)    LEAST SIGNIFICANT CHANGE AT 95% C I:  Lumbar spine: 0 030 gm/cm2 (2 5%)  Total hip: 0 015 gm/cm2 (1 7%)  Forearm: 0 034 gm/cm2 (5 2%)  Workstation performed: [id number]    ?  ?  ? I have personally reviewed pertinent reports  Portions of the record may have been created with voice recognition software   Occasional wrong word or "sound a like" substitutions may have occurred due to the inherent limitations of voice recognition software  Read the chart carefully and recognize, using context, where substitutions have occurred

## 2023-03-01 ENCOUNTER — VBI (OUTPATIENT)
Dept: ADMINISTRATIVE | Facility: OTHER | Age: 73
End: 2023-03-01

## 2023-03-13 ENCOUNTER — TELEPHONE (OUTPATIENT)
Dept: FAMILY MEDICINE CLINIC | Facility: CLINIC | Age: 73
End: 2023-03-13

## 2023-03-13 NOTE — TELEPHONE ENCOUNTER
Visiting nurse called stating patient  BP was 164/100 today she is currently not on BP medication     Spoke to patient scheduled her for Thursday 3/16/23 she could not come in sooner she stated she is not having any symptoms of headache or does not feel off she does have some pain due to recent procedure       Please advise  Darrel Dodd

## 2023-03-16 ENCOUNTER — OFFICE VISIT (OUTPATIENT)
Dept: FAMILY MEDICINE CLINIC | Facility: CLINIC | Age: 73
End: 2023-03-16

## 2023-03-16 VITALS
DIASTOLIC BLOOD PRESSURE: 100 MMHG | HEART RATE: 95 BPM | WEIGHT: 132 LBS | BODY MASS INDEX: 24.92 KG/M2 | HEIGHT: 61 IN | OXYGEN SATURATION: 97 % | SYSTOLIC BLOOD PRESSURE: 166 MMHG | TEMPERATURE: 97.5 F

## 2023-03-16 DIAGNOSIS — R03.0 ELEVATED BLOOD PRESSURE READING: Primary | ICD-10-CM

## 2023-03-16 NOTE — PROGRESS NOTES
Assessment/Plan:    No problem-specific Assessment & Plan notes found for this encounter  Diagnoses and all orders for this visit:    Elevated blood pressure reading  Comments:  stop phemtermine, keep a home log          PHQ-2/9 Depression Screening              Subjective:      Patient ID: Prakash Barclay is a 81 y o  female  Pt here regarding elevated blood pressure, pt is checking Bps at home and seeing an average of 140 over 90, pt is not on Bp meds      The following portions of the patient's history were reviewed and updated as appropriate: allergies, current medications, past family history, past medical history, past social history, past surgical history and problem list     Review of Systems   Eyes: Negative for visual disturbance  Cardiovascular: Negative for chest pain and palpitations  Neurological: Negative for headaches  Objective:    /100   Pulse 95   Temp 97 5 °F (36 4 °C) (Tympanic)   Ht 5' 1" (1 549 m)   Wt 59 9 kg (132 lb)   SpO2 97%   BMI 24 94 kg/m²      Physical Exam  Vitals and nursing note reviewed  Constitutional:       General: She is not in acute distress  Appearance: Normal appearance  She is well-developed  She is not ill-appearing, toxic-appearing or diaphoretic  HENT:      Head: Normocephalic and atraumatic  Right Ear: Tympanic membrane, ear canal and external ear normal  There is no impacted cerumen  Left Ear: Tympanic membrane, ear canal and external ear normal  There is no impacted cerumen  Nose: Nose normal  No congestion or rhinorrhea  Mouth/Throat:      Mouth: Mucous membranes are moist       Pharynx: Oropharynx is clear  No oropharyngeal exudate or posterior oropharyngeal erythema  Eyes:      General: No scleral icterus  Right eye: No discharge  Left eye: No discharge  Conjunctiva/sclera: Conjunctivae normal       Pupils: Pupils are equal, round, and reactive to light     Cardiovascular: Rate and Rhythm: Normal rate and regular rhythm  Pulses: Normal pulses  Heart sounds: Normal heart sounds  No murmur heard  Pulmonary:      Effort: Pulmonary effort is normal  No respiratory distress  Breath sounds: Normal breath sounds  No wheezing, rhonchi or rales  Abdominal:      General: Bowel sounds are normal  There is no distension  Palpations: Abdomen is soft  There is no mass  Tenderness: There is no abdominal tenderness  There is no guarding or rebound  Musculoskeletal:         General: Normal range of motion  Cervical back: Normal range of motion and neck supple  No rigidity  Right lower leg: No edema  Left lower leg: No edema  Lymphadenopathy:      Cervical: No cervical adenopathy  Skin:     General: Skin is warm and dry  Findings: No rash  Neurological:      General: No focal deficit present  Mental Status: She is alert and oriented to person, place, and time  Sensory: No sensory deficit  Motor: No weakness or abnormal muscle tone  Coordination: Coordination normal       Gait: Gait normal       Deep Tendon Reflexes: Reflexes are normal and symmetric  Psychiatric:         Mood and Affect: Mood normal          Behavior: Behavior normal          Thought Content:  Thought content normal          Judgment: Judgment normal

## 2023-03-17 ENCOUNTER — TELEPHONE (OUTPATIENT)
Dept: FAMILY MEDICINE CLINIC | Facility: CLINIC | Age: 73
End: 2023-03-17

## 2023-03-17 NOTE — TELEPHONE ENCOUNTER
Patient's home health nurse called and wanted to make us aware that her blood pressure was 152/76  Patient is not experiencing any symptoms at this time

## 2023-03-30 ENCOUNTER — OFFICE VISIT (OUTPATIENT)
Dept: FAMILY MEDICINE CLINIC | Facility: CLINIC | Age: 73
End: 2023-03-30

## 2023-03-30 VITALS
HEART RATE: 103 BPM | OXYGEN SATURATION: 97 % | TEMPERATURE: 97.6 F | SYSTOLIC BLOOD PRESSURE: 154 MMHG | DIASTOLIC BLOOD PRESSURE: 92 MMHG | BODY MASS INDEX: 24.88 KG/M2 | WEIGHT: 131.8 LBS | HEIGHT: 61 IN

## 2023-03-30 DIAGNOSIS — I10 ESSENTIAL HYPERTENSION: Primary | ICD-10-CM

## 2023-03-30 DIAGNOSIS — D22.9 ATYPICAL MOLE: ICD-10-CM

## 2023-03-30 DIAGNOSIS — R03.0 ELEVATED BLOOD PRESSURE READING: ICD-10-CM

## 2023-03-30 RX ORDER — LISINOPRIL 20 MG/1
20 TABLET ORAL DAILY
Qty: 30 TABLET | Refills: 6 | Status: SHIPPED | OUTPATIENT
Start: 2023-03-30

## 2023-03-30 NOTE — PROGRESS NOTES
"Assessment/Plan:    No problem-specific Assessment & Plan notes found for this encounter  Diagnoses and all orders for this visit:    Essential hypertension  -     lisinopril (ZESTRIL) 20 mg tablet; Take 1 tablet (20 mg total) by mouth daily    Atypical mole  Comments:  pt set up for office bx in 2 weeks    Elevated blood pressure reading  -     lisinopril (ZESTRIL) 20 mg tablet; Take 1 tablet (20 mg total) by mouth daily          PHQ-2/9 Depression Screening    Little interest or pleasure in doing things: 0 - not at all  Feeling down, depressed, or hopeless: 0 - not at all  PHQ-2 Score: 0  PHQ-2 Interpretation: Negative depression screen            Subjective:      Patient ID: Luna Hyatt is a 81 y o  female  Follow up for elevated BP readings pt has been checking Bps at home and seeing an average of    Hypertension  This is a chronic problem  The current episode started more than 1 month ago  The problem is unchanged  The problem is uncontrolled  Pertinent negatives include no chest pain, headaches or palpitations  The following portions of the patient's history were reviewed and updated as appropriate: allergies, current medications, past family history, past medical history, past social history, past surgical history and problem list     Review of Systems   Eyes: Negative for visual disturbance  Cardiovascular: Negative for chest pain and palpitations  Neurological: Negative for headaches  Objective:    /92 (BP Location: Left arm, Patient Position: Sitting)   Pulse 103   Temp 97 6 °F (36 4 °C) (Tympanic)   Ht 5' 1\" (1 549 m)   Wt 59 8 kg (131 lb 12 8 oz)   SpO2 97%   BMI 24 90 kg/m²      Physical Exam  Vitals and nursing note reviewed  Constitutional:       General: She is not in acute distress  Appearance: Normal appearance  She is not ill-appearing, toxic-appearing or diaphoretic  HENT:      Head: Normocephalic and atraumatic     Eyes:      " Conjunctiva/sclera: Conjunctivae normal    Cardiovascular:      Rate and Rhythm: Normal rate and regular rhythm  Heart sounds: Normal heart sounds  No murmur heard  Pulmonary:      Effort: Pulmonary effort is normal  No respiratory distress  Breath sounds: Normal breath sounds  No wheezing, rhonchi or rales  Abdominal:      General: There is no distension  Palpations: Abdomen is soft  There is no mass  Tenderness: There is no abdominal tenderness  There is no guarding or rebound  Musculoskeletal:      Cervical back: Normal range of motion and neck supple  No rigidity  Right lower leg: No edema  Left lower leg: No edema  Lymphadenopathy:      Cervical: No cervical adenopathy  Neurological:      Mental Status: She is alert and oriented to person, place, and time  Psychiatric:         Mood and Affect: Mood normal          Behavior: Behavior normal          Thought Content:  Thought content normal          Judgment: Judgment normal

## 2023-04-03 DIAGNOSIS — G43.809 OTHER MIGRAINE WITHOUT STATUS MIGRAINOSUS, NOT INTRACTABLE: ICD-10-CM

## 2023-04-04 RX ORDER — BUTALBITAL, ACETAMINOPHEN AND CAFFEINE 50; 325; 40 MG/1; MG/1; MG/1
1 TABLET ORAL EVERY 6 HOURS PRN
Qty: 60 TABLET | Refills: 0 | Status: SHIPPED | OUTPATIENT
Start: 2023-04-04

## 2023-04-07 ENCOUNTER — TELEPHONE (OUTPATIENT)
Dept: ENDOCRINOLOGY | Facility: CLINIC | Age: 73
End: 2023-04-07

## 2023-04-07 NOTE — TELEPHONE ENCOUNTER
Pt wants to discuss reclast infusion -due to implants (bottom teeth) her periodontist is recommending that she doesn't get the infusion   Wants to know what her other options are

## 2023-04-19 PROBLEM — I10 HYPERTENSION: Status: ACTIVE | Noted: 2023-04-19

## 2023-04-19 PROBLEM — F41.9 ANXIETY DISORDER: Status: ACTIVE | Noted: 2023-04-19

## 2023-04-26 ENCOUNTER — HOSPITAL ENCOUNTER (OUTPATIENT)
Dept: INFUSION CENTER | Facility: HOSPITAL | Age: 73
Discharge: HOME | End: 2023-04-26
Attending: STUDENT IN AN ORGANIZED HEALTH CARE EDUCATION/TRAINING PROGRAM

## 2023-05-24 ENCOUNTER — ESTABLISHED COMPREHENSIVE EXAM (OUTPATIENT)
Dept: URBAN - METROPOLITAN AREA CLINIC 6 | Facility: CLINIC | Age: 73
End: 2023-05-24

## 2023-05-24 DIAGNOSIS — Z96.1: ICD-10-CM

## 2023-05-24 PROCEDURE — 92014 COMPRE OPH EXAM EST PT 1/>: CPT

## 2023-05-24 ASSESSMENT — TONOMETRY
OD_IOP_MMHG: 12
OS_IOP_MMHG: 14

## 2023-05-24 ASSESSMENT — VISUAL ACUITY
OS_CC: 20/60
OD_CC: 20/30

## 2023-06-28 ENCOUNTER — OFFICE VISIT (OUTPATIENT)
Dept: INTERNAL MEDICINE CLINIC | Facility: CLINIC | Age: 73
End: 2023-06-28
Payer: MEDICARE

## 2023-06-28 VITALS
BODY MASS INDEX: 28.43 KG/M2 | TEMPERATURE: 98.1 F | WEIGHT: 141 LBS | HEART RATE: 71 BPM | SYSTOLIC BLOOD PRESSURE: 142 MMHG | OXYGEN SATURATION: 98 % | HEIGHT: 59 IN | DIASTOLIC BLOOD PRESSURE: 78 MMHG

## 2023-06-28 DIAGNOSIS — R63.5 WEIGHT GAIN: ICD-10-CM

## 2023-06-28 DIAGNOSIS — R14.0 ABDOMINAL DISTENSION: ICD-10-CM

## 2023-06-28 DIAGNOSIS — G43.809 OTHER MIGRAINE WITHOUT STATUS MIGRAINOSUS, NOT INTRACTABLE: ICD-10-CM

## 2023-06-28 DIAGNOSIS — I10 HYPERTENSION, UNSPECIFIED TYPE: Primary | ICD-10-CM

## 2023-06-28 PROCEDURE — 99204 OFFICE O/P NEW MOD 45 MIN: CPT | Performed by: PHYSICIAN ASSISTANT

## 2023-06-28 RX ORDER — BUTALBITAL, ACETAMINOPHEN AND CAFFEINE 50; 325; 40 MG/1; MG/1; MG/1
1 TABLET ORAL EVERY 6 HOURS PRN
Qty: 60 TABLET | Refills: 0 | Status: SHIPPED | OUTPATIENT
Start: 2023-06-28

## 2023-06-28 RX ORDER — FLUOXETINE 10 MG/1
CAPSULE ORAL
COMMUNITY
Start: 2023-04-19 | End: 2023-06-28

## 2023-06-28 RX ORDER — METOPROLOL SUCCINATE 100 MG/1
100 TABLET, EXTENDED RELEASE ORAL DAILY
Qty: 30 TABLET | Refills: 5 | Status: SHIPPED | OUTPATIENT
Start: 2023-06-28 | End: 2023-12-25

## 2023-06-29 PROBLEM — R63.5 WEIGHT GAIN: Status: ACTIVE | Noted: 2023-06-29

## 2023-06-29 PROBLEM — R14.0 ABDOMINAL DISTENSION: Status: ACTIVE | Noted: 2023-06-29

## 2023-06-29 NOTE — PROGRESS NOTES
Name: Dontae Lynn      : 1950      MRN: [id number]  Encounter Provider: Mi Henry PA-C  Encounter Date: 2023   Encounter department: [address] [place] [address]     1  Hypertension, unspecified type  Assessment & Plan:  Continue toprol but change to XL and increase to 100mg daily  Directions for use and possible side effects discussed and patient verbalized understanding of these  Orders:  -     metoprolol succinate (Toprol XL) 100 mg 24 hr tablet; Take 1 tablet (100 mg total) by mouth daily    2  Abdominal distension  Assessment & Plan:  CT A/P ordered, pt had open abdominal surgery in the past  Will treat according to results  Orders:  -     CT abdomen pelvis wo contrast; Future; Expected date: 2023    3  Weight gain  -     TSH, 3rd generation with Free T4 reflex; Future    4  Other migraine without status migrainosus, not intractable  -     butalbital-acetaminophen-caffeine (FIORICET,ESGIC) -40 mg per tablet; Take 1 tablet by mouth every 6 (six) hours as needed for headaches      BMI Counseling: Body mass index is 28 48 kg/m²  The BMI is above normal  Nutrition recommendations include decreasing portion sizes, consuming healthier snacks and limiting drinks that contain sugar  Rationale for BMI follow-up plan is due to patient being overweight or obese  Subjective      Pt presents to establish care  PMHx includes HTN, hyperlipidemia, hypothyroidism, and osteoporosis  PSurgHx includes rectal prolapse repair and revision of same and total hysterectomy  Allergies and medications noted in the chart  She is a former smoker  She is   Her parents are , mother had glaucoma, father had asthma, COPD, and cancer  Covid vaccine encouraged  She is up to date with labs, mammogram, dexa scan, and CRC screening  She will be due for AWV  She is noting issues with her BP since having surgery earlier this year  BP today is 142/78   She is presently on metoprolol  She tolerates this well  She has tried lisinopril but did not feel it helped  She also notes issues with 10lb weight gain associated with abdominal distention and some discomfort  Denies nausea, vomiting, diarrhea, or constipation     Review of Systems   Constitutional: Positive for unexpected weight change  Negative for chills and fever  HENT: Negative for congestion, ear pain, hearing loss, postnasal drip, rhinorrhea, sinus pressure, sinus pain, sore throat and trouble swallowing  Eyes: Negative for pain and visual disturbance  Respiratory: Negative for cough, chest tightness, shortness of breath and wheezing  Cardiovascular: Negative  Negative for chest pain, palpitations and leg swelling  Gastrointestinal: Positive for abdominal distention and abdominal pain  Negative for blood in stool, constipation, diarrhea, nausea and vomiting  Endocrine: Negative for cold intolerance, heat intolerance, polydipsia, polyphagia and polyuria  Genitourinary: Negative for difficulty urinating, dysuria, flank pain and urgency  Musculoskeletal: Negative for arthralgias, back pain, gait problem and myalgias  Skin: Negative for rash  Allergic/Immunologic: Negative  Neurological: Negative for dizziness, weakness, light-headedness and headaches  Hematological: Negative  Psychiatric/Behavioral: Negative for behavioral problems, dysphoric mood and sleep disturbance  The patient is not nervous/anxious          Current Outpatient Medications on File Prior to Visit   Medication Sig   • Cholecalciferol (VITAMIN D PO) Take 5,000 Units by mouth in the morning   • Coenzyme Q10 (COQ10) 100 MG CAPS Take by mouth in the morning   • ezetimibe-simvastatin (VYTORIN) 10-40 mg per tablet TAKE 1 TABLET BY MOUTH DAILY AT BEDTIME   • fenofibrate micronized (ANTARA) 130 MG capsule TAKE 1 CAPSULE (130 MG TOTAL) BY MOUTH DAILY WITH BREAKFAST   • FLUoxetine (PROzac) 20 mg capsule Take 1 capsule (20 "mg total) by mouth daily   • levothyroxine 50 mcg tablet TAKE 1 TABLET (50 MCG TOTAL) BY MOUTH DAILY   • Magnesium 100 MG TABS Take by mouth in the morning   • Multiple Vitamin (MULTI-VITAMIN DAILY PO) once daily   • naproxen (NAPROSYN) 500 mg tablet if needed   • Omega-3 1000 MG CAPS Take by mouth in the morning   • Potassium Gluconate 80 MG TABS Take by mouth in the morning   • Progesterone 100 MG CAPS Take 150 mg by mouth in the morning   • ibandronate (BONIVA) 150 MG tablet Take 1 tablet (150 mg total) by mouth every 30 (thirty) days (Patient not taking: Reported on 11/3/2022)   • lisinopril (ZESTRIL) 20 mg tablet Take 1 tablet (20 mg total) by mouth daily (Patient not taking: Reported on 6/28/2023)       Objective     /78 (BP Location: Left arm, Patient Position: Sitting)   Pulse 71   Temp 98 1 °F (36 7 °C)   Ht 4' 11\" (1 499 m)   Wt 64 kg (141 lb)   SpO2 98%   BMI 28 48 kg/m²     Physical Exam  Vitals and nursing note reviewed  Constitutional:       General: She is not in acute distress  Appearance: She is well-developed  She is not diaphoretic  HENT:      Head: Normocephalic and atraumatic  Right Ear: External ear normal       Left Ear: External ear normal       Nose: Nose normal       Mouth/Throat:      Pharynx: No oropharyngeal exudate  Eyes:      General: No scleral icterus  Right eye: No discharge  Left eye: No discharge  Conjunctiva/sclera: Conjunctivae normal       Pupils: Pupils are equal, round, and reactive to light  Neck:      Thyroid: No thyromegaly  Cardiovascular:      Rate and Rhythm: Normal rate and regular rhythm  Heart sounds: Normal heart sounds  No murmur heard  No friction rub  No gallop  Pulmonary:      Effort: Pulmonary effort is normal  No respiratory distress  Breath sounds: Normal breath sounds  No wheezing or rales  Abdominal:      General: Bowel sounds are normal  There is distension        Palpations: Abdomen is " soft       Tenderness: There is no abdominal tenderness  Musculoskeletal:         General: No tenderness or deformity  Normal range of motion  Cervical back: Normal range of motion and neck supple  Skin:     General: Skin is warm and dry  Neurological:      Mental Status: She is alert and oriented to person, place, and time  Cranial Nerves: No cranial nerve deficit  Psychiatric:         Behavior: Behavior normal          Thought Content:  Thought content normal          Judgment: Judgment normal        Ellen Mei PA-C

## 2023-06-29 NOTE — ASSESSMENT & PLAN NOTE
Continue toprol but change to XL and increase to 100mg daily  Directions for use and possible side effects discussed and patient verbalized understanding of these

## 2023-07-07 ENCOUNTER — HOSPITAL ENCOUNTER (OUTPATIENT)
Dept: CT IMAGING | Facility: HOSPITAL | Age: 73
End: 2023-07-07
Payer: MEDICARE

## 2023-07-07 DIAGNOSIS — R14.0 ABDOMINAL DISTENSION: ICD-10-CM

## 2023-07-07 PROCEDURE — 74176 CT ABD & PELVIS W/O CONTRAST: CPT

## 2023-07-07 PROCEDURE — G1004 CDSM NDSC: HCPCS

## 2023-07-21 ENCOUNTER — RA CDI HCC (OUTPATIENT)
Dept: OTHER | Facility: HOSPITAL | Age: 73
End: 2023-07-21

## 2023-07-25 ENCOUNTER — APPOINTMENT (OUTPATIENT)
Dept: LAB | Facility: CLINIC | Age: 73
End: 2023-07-25
Payer: MEDICARE

## 2023-07-25 DIAGNOSIS — F41.9 ANXIETY DISORDER, UNSPECIFIED TYPE: ICD-10-CM

## 2023-07-25 DIAGNOSIS — M81.0 OSTEOPOROSIS, UNSPECIFIED OSTEOPOROSIS TYPE, UNSPECIFIED PATHOLOGICAL FRACTURE PRESENCE: ICD-10-CM

## 2023-07-25 DIAGNOSIS — M81.0 OSTEOPOROSIS WITHOUT CURRENT PATHOLOGICAL FRACTURE, UNSPECIFIED OSTEOPOROSIS TYPE: ICD-10-CM

## 2023-07-25 DIAGNOSIS — R63.5 WEIGHT GAIN: ICD-10-CM

## 2023-07-25 DIAGNOSIS — I10 HYPERTENSION, UNSPECIFIED TYPE: ICD-10-CM

## 2023-07-25 LAB
CALCIUM 24H UR-MCNC: 105.8 MG/24 HRS (ref 42–353)
CREAT 24H UR-MRATE: 0.9 G/24HR (ref 0.6–1.8)
PERIOD: 24 HOURS
SODIUM 24H UR-SRATE: 161 MMOL/24 HRS (ref 40–220)
SPECIMEN VOL UR: 1150 ML

## 2023-07-25 PROCEDURE — 86231 EMA EACH IG CLASS: CPT

## 2023-07-25 PROCEDURE — 83735 ASSAY OF MAGNESIUM: CPT

## 2023-07-25 PROCEDURE — 83970 ASSAY OF PARATHORMONE: CPT

## 2023-07-25 PROCEDURE — 86258 DGP ANTIBODY EACH IG CLASS: CPT

## 2023-07-25 PROCEDURE — 36415 COLL VENOUS BLD VENIPUNCTURE: CPT

## 2023-07-25 PROCEDURE — 80053 COMPREHEN METABOLIC PANEL: CPT

## 2023-07-25 PROCEDURE — 84300 ASSAY OF URINE SODIUM: CPT

## 2023-07-25 PROCEDURE — 82306 VITAMIN D 25 HYDROXY: CPT

## 2023-07-25 PROCEDURE — 82340 ASSAY OF CALCIUM IN URINE: CPT

## 2023-07-25 PROCEDURE — 82570 ASSAY OF URINE CREATININE: CPT

## 2023-07-25 PROCEDURE — 82784 ASSAY IGA/IGD/IGG/IGM EACH: CPT

## 2023-07-25 PROCEDURE — 84443 ASSAY THYROID STIM HORMONE: CPT

## 2023-07-25 PROCEDURE — 84100 ASSAY OF PHOSPHORUS: CPT

## 2023-07-25 PROCEDURE — 82330 ASSAY OF CALCIUM: CPT

## 2023-07-25 PROCEDURE — 86364 TISS TRNSGLTMNASE EA IG CLAS: CPT

## 2023-07-26 LAB
25(OH)D3 SERPL-MCNC: 59.7 NG/ML (ref 30–100)
ALBUMIN SERPL BCP-MCNC: 3.7 G/DL (ref 3.5–5)
ALP SERPL-CCNC: 61 U/L (ref 46–116)
ALT SERPL W P-5'-P-CCNC: 24 U/L (ref 12–78)
ANION GAP SERPL CALCULATED.3IONS-SCNC: 0 MMOL/L
AST SERPL W P-5'-P-CCNC: 16 U/L (ref 5–45)
BILIRUB SERPL-MCNC: 0.38 MG/DL (ref 0.2–1)
BUN SERPL-MCNC: 13 MG/DL (ref 5–25)
CA-I BLD-SCNC: 1.24 MMOL/L (ref 1.12–1.32)
CALCIUM SERPL-MCNC: 9.6 MG/DL (ref 8.3–10.1)
CHLORIDE SERPL-SCNC: 110 MMOL/L (ref 96–108)
CO2 SERPL-SCNC: 30 MMOL/L (ref 21–32)
CREAT SERPL-MCNC: 0.8 MG/DL (ref 0.6–1.3)
GFR SERPL CREATININE-BSD FRML MDRD: 74 ML/MIN/1.73SQ M
GLUCOSE P FAST SERPL-MCNC: 91 MG/DL (ref 65–99)
MAGNESIUM SERPL-MCNC: 2.1 MG/DL (ref 1.6–2.6)
PHOSPHATE SERPL-MCNC: 3.3 MG/DL (ref 2.3–4.1)
POTASSIUM SERPL-SCNC: 4 MMOL/L (ref 3.5–5.3)
PROT SERPL-MCNC: 7.2 G/DL (ref 6.4–8.4)
PTH-INTACT SERPL-MCNC: 55.3 PG/ML (ref 12–88)
SODIUM SERPL-SCNC: 140 MMOL/L (ref 135–147)
TSH SERPL DL<=0.05 MIU/L-ACNC: 1.92 UIU/ML (ref 0.45–4.5)

## 2023-07-27 LAB
ENDOMYSIUM IGA SER QL: NEGATIVE
GLIADIN PEPTIDE IGA SER-ACNC: 4 UNITS (ref 0–19)
GLIADIN PEPTIDE IGG SER-ACNC: <1 UNITS (ref 0–19)
IGA SERPL-MCNC: 169 MG/DL (ref 87–352)
TTG IGA SER-ACNC: <2 U/ML (ref 0–3)
TTG IGG SER-ACNC: <2 U/ML (ref 0–5)

## 2023-07-28 ENCOUNTER — OFFICE VISIT (OUTPATIENT)
Dept: INTERNAL MEDICINE CLINIC | Facility: CLINIC | Age: 73
End: 2023-07-28
Payer: MEDICARE

## 2023-07-28 VITALS
OXYGEN SATURATION: 98 % | DIASTOLIC BLOOD PRESSURE: 76 MMHG | SYSTOLIC BLOOD PRESSURE: 140 MMHG | HEIGHT: 59 IN | WEIGHT: 140 LBS | TEMPERATURE: 97.7 F | HEART RATE: 80 BPM | BODY MASS INDEX: 28.22 KG/M2

## 2023-07-28 DIAGNOSIS — R63.5 WEIGHT GAIN: ICD-10-CM

## 2023-07-28 DIAGNOSIS — I10 PRIMARY HYPERTENSION: Primary | ICD-10-CM

## 2023-07-28 DIAGNOSIS — E66.3 OVERWEIGHT (BMI 25.0-29.9): ICD-10-CM

## 2023-07-28 DIAGNOSIS — Z00.00 MEDICARE ANNUAL WELLNESS VISIT, SUBSEQUENT: ICD-10-CM

## 2023-07-28 PROCEDURE — 99213 OFFICE O/P EST LOW 20 MIN: CPT | Performed by: PHYSICIAN ASSISTANT

## 2023-07-28 PROCEDURE — G0439 PPPS, SUBSEQ VISIT: HCPCS | Performed by: PHYSICIAN ASSISTANT

## 2023-07-28 RX ORDER — BUPROPION HYDROCHLORIDE 150 MG/1
150 TABLET ORAL EVERY MORNING
Qty: 30 TABLET | Refills: 5 | Status: SHIPPED | OUTPATIENT
Start: 2023-07-28 | End: 2024-01-24

## 2023-07-28 NOTE — PATIENT INSTRUCTIONS
Medicare Preventive Visit Patient Instructions  Thank you for completing your Welcome to Medicare Visit or Medicare Annual Wellness Visit today. Your next wellness visit will be due in one year (7/28/2024). The screening/preventive services that you may require over the next 5-10 years are detailed below. Some tests may not apply to you based off risk factors and/or age. Screening tests ordered at today's visit but not completed yet may show as past due. Also, please note that scanned in results may not display below. Preventive Screenings:  Service Recommendations Previous Testing/Comments   Colorectal Cancer Screening  * Colonoscopy    * Fecal Occult Blood Test (FOBT)/Fecal Immunochemical Test (FIT)  * Fecal DNA/Cologuard Test  * Flexible Sigmoidoscopy Age: 43-73 years old   Colonoscopy: every 10 years (may be performed more frequently if at higher risk)  OR  FOBT/FIT: every 1 year  OR  Cologuard: every 3 years  OR  Sigmoidoscopy: every 5 years  Screening may be recommended earlier than age 39 if at higher risk for colorectal cancer. Also, an individualized decision between you and your healthcare provider will decide whether screening between the ages of 77-80 would be appropriate. Colonoscopy: 05/20/2021  FOBT/FIT: Not on file  Cologuard: Not on file  Sigmoidoscopy: Not on file    Screening Current     Breast Cancer Screening Age: 36 years old  Frequency: every 1-2 years  Not required if history of left and right mastectomy Mammogram: 12/09/2022    Screening Current   Cervical Cancer Screening Between the ages of 21-29, pap smear recommended once every 3 years. Between the ages of 32-69, can perform pap smear with HPV co-testing every 5 years.    Recommendations may differ for women with a history of total hysterectomy, cervical cancer, or abnormal pap smears in past. Pap Smear: 12/13/2022    Screening Not Indicated   Hepatitis C Screening Once for adults born between 1945 and 1965  More frequently in patients at high risk for Hepatitis C Hep C Antibody: 07/21/2020    Screening Current   Diabetes Screening 1-2 times per year if you're at risk for diabetes or have pre-diabetes Fasting glucose: 91 mg/dL (7/25/2023)  A1C: 5.3 % (12/30/2022)  Screening Current   Cholesterol Screening Once every 5 years if you don't have a lipid disorder. May order more often based on risk factors. Lipid panel: 06/10/2022    Screening Not Indicated  History Lipid Disorder     Other Preventive Screenings Covered by Medicare:  1. Abdominal Aortic Aneurysm (AAA) Screening: covered once if your at risk. You're considered to be at risk if you have a family history of AAA. 2. Lung Cancer Screening: covers low dose CT scan once per year if you meet all of the following conditions: (1) Age 48-67; (2) No signs or symptoms of lung cancer; (3) Current smoker or have quit smoking within the last 15 years; (4) You have a tobacco smoking history of at least 20 pack years (packs per day multiplied by number of years you smoked); (5) You get a written order from a healthcare provider. 3. Glaucoma Screening: covered annually if you're considered high risk: (1) You have diabetes OR (2) Family history of glaucoma OR (3)  aged 48 and older OR (3)  American aged 72 and older  3. Osteoporosis Screening: covered every 2 years if you meet one of the following conditions: (1) You're estrogen deficient and at risk for osteoporosis based off medical history and other findings; (2) Have a vertebral abnormality; (3) On glucocorticoid therapy for more than 3 months; (4) Have primary hyperparathyroidism; (5) On osteoporosis medications and need to assess response to drug therapy. · Last bone density test (DXA Scan): 10/03/2022.  5. HIV Screening: covered annually if you're between the age of 15-65. Also covered annually if you are younger than 13 and older than 72 with risk factors for HIV infection.  For pregnant patients, it is covered up to 3 times per pregnancy. Immunizations:  Immunization Recommendations   Influenza Vaccine Annual influenza vaccination during flu season is recommended for all persons aged >= 6 months who do not have contraindications   Pneumococcal Vaccine   * Pneumococcal conjugate vaccine = PCV13 (Prevnar 13), PCV15 (Vaxneuvance), PCV20 (Prevnar 20)  * Pneumococcal polysaccharide vaccine = PPSV23 (Pneumovax) Adults 20-63 years old: 1-3 doses may be recommended based on certain risk factors  Adults 72 years old: 1-2 doses may be recommended based off what pneumonia vaccine you previously received   Hepatitis B Vaccine 3 dose series if at intermediate or high risk (ex: diabetes, end stage renal disease, liver disease)   Tetanus (Td) Vaccine - COST NOT COVERED BY MEDICARE PART B Following completion of primary series, a booster dose should be given every 10 years to maintain immunity against tetanus. Td may also be given as tetanus wound prophylaxis. Tdap Vaccine - COST NOT COVERED BY MEDICARE PART B Recommended at least once for all adults. For pregnant patients, recommended with each pregnancy. Shingles Vaccine (Shingrix) - COST NOT COVERED BY MEDICARE PART B  2 shot series recommended in those aged 48 and above     Health Maintenance Due:      Topic Date Due   • Colorectal Cancer Screening  05/20/2024   • Breast Cancer Screening: Mammogram  12/09/2024   • Hepatitis C Screening  Completed     Immunizations Due:      Topic Date Due   • COVID-19 Vaccine (1) Never done   • Influenza Vaccine (1) 09/01/2023     Advance Directives   What are advance directives? Advance directives are legal documents that state your wishes and plans for medical care. These plans are made ahead of time in case you lose your ability to make decisions for yourself. Advance directives can apply to any medical decision, such as the treatments you want, and if you want to donate organs. What are the types of advance directives?   There are many types of advance directives, and each state has rules about how to use them. You may choose a combination of any of the following:  · Living will: This is a written record of the treatment you want. You can also choose which treatments you do not want, which to limit, and which to stop at a certain time. This includes surgery, medicine, IV fluid, and tube feedings. · Durable power of  for healthcare [facility]): This is a written record that states who you want to make healthcare choices for you when you are unable to make them for yourself. This person, called a proxy, is usually a family member or a friend. You may choose more than 1 proxy. · Do not resuscitate (DNR) order:  A DNR order is used in case your heart stops beating or you stop breathing. It is a request not to have certain forms of treatment, such as CPR. A DNR order may be included in other types of advance directives. · Medical directive: This covers the care that you want if you are in a coma, near death, or unable to make decisions for yourself. You can list the treatments you want for each condition. Treatment may include pain medicine, surgery, blood transfusions, dialysis, IV or tube feedings, and a ventilator (breathing machine). · Values history: This document has questions about your views, beliefs, and how you feel and think about life. This information can help others choose the care that you would choose. Why are advance directives important? An advance directive helps you control your care. Although spoken wishes may be used, it is better to have your wishes written down. Spoken wishes can be misunderstood, or not followed. Treatments may be given even if you do not want them. An advance directive may make it easier for your family to make difficult choices about your care.    Weight Management   Why it is important to manage your weight:  Being overweight increases your risk of health conditions such as heart disease, high blood pressure, type 2 diabetes, and certain types of cancer. It can also increase your risk for osteoarthritis, sleep apnea, and other respiratory problems. Aim for a slow, steady weight loss. Even a small amount of weight loss can lower your risk of health problems. How to lose weight safely:  A safe and healthy way to lose weight is to eat fewer calories and get regular exercise. You can lose up about 1 pound a week by decreasing the number of calories you eat by 500 calories each day. Healthy meal plan for weight management:  A healthy meal plan includes a variety of foods, contains fewer calories, and helps you stay healthy. A healthy meal plan includes the following:  · Eat whole-grain foods more often. A healthy meal plan should contain fiber. Fiber is the part of grains, fruits, and vegetables that is not broken down by your body. Whole-grain foods are healthy and provide extra fiber in your diet. Some examples of whole-grain foods are whole-wheat breads and pastas, oatmeal, brown rice, and bulgur. · Eat a variety of vegetables every day. Include dark, leafy greens such as spinach, kale, cherie greens, and mustard greens. Eat yellow and orange vegetables such as carrots, sweet potatoes, and winter squash. · Eat a variety of fruits every day. Choose fresh or canned fruit (canned in its own juice or light syrup) instead of juice. Fruit juice has very little or no fiber. · Eat low-fat dairy foods. Drink fat-free (skim) milk or 1% milk. Eat fat-free yogurt and low-fat cottage cheese. Try low-fat cheeses such as mozzarella and other reduced-fat cheeses. · Choose meat and other protein foods that are low in fat. Choose beans or other legumes such as split peas or lentils. Choose fish, skinless poultry (chicken or turkey), or lean cuts of red meat (beef or pork). Before you cook meat or poultry, cut off any visible fat. · Use less fat and oil. Try baking foods instead of frying them.  Add less fat, such as margarine, sour cream, regular salad dressing and mayonnaise to foods. Eat fewer high-fat foods. Some examples of high-fat foods include french fries, doughnuts, ice cream, and cakes. · Eat fewer sweets. Limit foods and drinks that are high in sugar. This includes candy, cookies, regular soda, and sweetened drinks. Exercise:  Exercise at least 30 minutes per day on most days of the week. Some examples of exercise include walking, biking, dancing, and swimming. You can also fit in more physical activity by taking the stairs instead of the elevator or parking farther away from stores. Ask your healthcare provider about the best exercise plan for you. © Copyright [employer] 2018 Information is for End User's use only and may not be sold, redistributed or otherwise used for commercial purposes.  All illustrations and images included in CareNotes® are the copyrighted property of [employer] or [address] [place] [address]

## 2023-07-28 NOTE — PROGRESS NOTES
Assessment and Plan:     Problem List Items Addressed This Visit        Cardiovascular and Mediastinum    Hypertension - Primary     Pts symptoms are stable with current regime. No changes at present. Other    Overweight (BMI 25.0-29. 9)    Relevant Medications    buPROPion (WELLBUTRIN XL) 150 mg 24 hr tablet    Weight gain     Start wellbutrin. .Directions for use and possible side effects discussed and patient verbalized understanding of these. Depression Screening and Follow-up Plan: Patient was screened for depression during today's encounter. They screened negative with a PHQ-2 score of 0. Preventive health issues were discussed with patient, and age appropriate screening tests were ordered as noted in patient's After Visit Summary. Personalized health advice and appropriate referrals for health education or preventive services given if needed, as noted in patient's After Visit Summary. History of Present Illness:     Patient presents for a Medicare Wellness Visit    Pt presents for routine visit. AWV also performed. She is up to date with mammogram, dexa scan, CRC screen and labs. Labs reviewed and normal. CT scan of abdomen was reviewed and normal except for a mildly thickened pylorus but she has no symptoms. BP is stable. She notes frustration with her weight and desires an option to help with this. Patient Care Team:  Clerance Rubinstein, PA-C as PCP - General (Family Medicine)  Lisa Madrigal DO as PCP - Endocrinology (Endocrinology)     Review of Systems:     Review of Systems   Constitutional: Positive for unexpected weight change. Negative for chills and fever. HENT: Negative for congestion, ear pain, hearing loss, postnasal drip, rhinorrhea, sinus pressure, sinus pain, sore throat and trouble swallowing. Eyes: Negative for pain and visual disturbance. Respiratory: Negative for cough, chest tightness, shortness of breath and wheezing.     Cardiovascular: Negative. Negative for chest pain, palpitations and leg swelling. Gastrointestinal: Negative for abdominal pain, blood in stool, constipation, diarrhea, nausea and vomiting. Endocrine: Negative for cold intolerance, heat intolerance, polydipsia, polyphagia and polyuria. Genitourinary: Negative for difficulty urinating, dysuria, flank pain and urgency. Musculoskeletal: Negative for arthralgias, back pain, gait problem and myalgias. Skin: Negative for rash. Allergic/Immunologic: Negative. Neurological: Negative for dizziness, weakness, light-headedness and headaches. Hematological: Negative. Psychiatric/Behavioral: Negative for behavioral problems, dysphoric mood and sleep disturbance. The patient is not nervous/anxious. Problem List:     Patient Active Problem List   Diagnosis   • Overweight (BMI 25.0-29. 9)   • Hypercholesterolemia   • Osteoporosis without current pathological fracture   • Hypertension   • Anxiety disorder   • Abdominal distension   • Weight gain      Past Medical and Surgical History:     Past Medical History:   Diagnosis Date   • Allergic     Seasonal   • Arthritis ?    • Depression    • Disease of thyroid gland    • Diverticulitis of colon About 10 yrs ago    Severe   • Headache(784.0) When I was 17 yrs old    Migraines   • Hypercholesteremia    • Migraine    • Osteoporosis      Past Surgical History:   Procedure Laterality Date   • COLON SURGERY  2021    Prolapse rectim   • FINGER FRACTURE SURGERY     • FRACTURE SURGERY  2015    Lower left plate,9 screws,2 pins   • HYSTERECTOMY     • LEG SURGERY     • RECTAL PROLAPSE REPAIR     • TOTAL ABDOMINAL HYSTERECTOMY W/ BILATERAL SALPINGOOPHORECTOMY        Family History:     Family History   Problem Relation Age of Onset   • Vision loss Mother    • Glaucoma Mother    • Cancer Father    • Asthma Father         Legionaries disease   • COPD Father         Legionnaires disease   • Hearing loss Father    • Ovarian cancer Maternal Grandmother    • No Known Problems Paternal Grandmother    • No Known Problems Paternal Aunt    • No Known Problems Paternal Aunt       Social History:     Social History     Socioeconomic History   • Marital status: /Civil Union     Spouse name: None   • Number of children: None   • Years of education: None   • Highest education level: None   Occupational History     Comment: retired teacher   Tobacco Use   • Smoking status: Former     Packs/day: 0.25     Years: 15.00     Total pack years: 3.75     Types: Cigarettes   • Smokeless tobacco: Never   Vaping Use   • Vaping Use: Never used   Substance and Sexual Activity   • Alcohol use: Not Currently     Comment: social   • Drug use: No   • Sexual activity: Not Currently     Partners: Male   Other Topics Concern   • None   Social History Narrative   • None     Social Determinants of Health     Financial Resource Strain: Low Risk  (7/28/2023)    Overall Financial Resource Strain (CARDIA)    • Difficulty of Paying Living Expenses: Not very hard   Food Insecurity: Not on file   Transportation Needs: No Transportation Needs (7/28/2023)    PRAPARE - Transportation    • Lack of Transportation (Medical): No    • Lack of Transportation (Non-Medical):  No   Physical Activity: Not on file   Stress: Not on file   Social Connections: Not on file   Intimate Partner Violence: Not on file   Housing Stability: Not on file      Medications and Allergies:     Current Outpatient Medications   Medication Sig Dispense Refill   • buPROPion (WELLBUTRIN XL) 150 mg 24 hr tablet Take 1 tablet (150 mg total) by mouth every morning 30 tablet 5   • butalbital-acetaminophen-caffeine (FIORICET,ESGIC) -40 mg per tablet Take 1 tablet by mouth every 6 (six) hours as needed for headaches 60 tablet 0   • Cholecalciferol (VITAMIN D PO) Take 5,000 Units by mouth in the morning     • Coenzyme Q10 (COQ10) 100 MG CAPS Take by mouth in the morning     • ezetimibe-simvastatin (VYTORIN) 10-40 mg per tablet TAKE 1 TABLET BY MOUTH DAILY AT BEDTIME 90 tablet 1   • fenofibrate micronized (ANTARA) 130 MG capsule TAKE 1 CAPSULE (130 MG TOTAL) BY MOUTH DAILY WITH BREAKFAST 90 capsule 1   • FLUoxetine (PROzac) 20 mg capsule Take 1 capsule (20 mg total) by mouth daily 90 capsule 1   • levothyroxine 50 mcg tablet TAKE 1 TABLET (50 MCG TOTAL) BY MOUTH DAILY 90 tablet 1   • Magnesium 100 MG TABS Take by mouth in the morning     • metoprolol succinate (Toprol XL) 100 mg 24 hr tablet Take 1 tablet (100 mg total) by mouth daily 30 tablet 5   • Multiple Vitamin (MULTI-VITAMIN DAILY PO) once daily     • naproxen (NAPROSYN) 500 mg tablet if needed     • Omega-3 1000 MG CAPS Take by mouth in the morning     • Potassium Gluconate 80 MG TABS Take by mouth in the morning     • Progesterone 100 MG CAPS Take 150 mg by mouth in the morning       No current facility-administered medications for this visit. Allergies   Allergen Reactions   • Bee Venom    • Latex    • Pollen Extract Other (See Comments)     Seasonal allergies      Immunizations:     Immunization History   Administered Date(s) Administered   • INFLUENZA 10/06/2018, 10/15/2019   • Influenza, high dose seasonal 0.7 mL 10/06/2020, 11/18/2021, 10/12/2022   • Pneumococcal Conjugate 13-Valent 08/25/2020   • Pneumococcal Polysaccharide PPV23 08/26/2021   • influenza, injectable, quadrivalent 10/06/2018   • influenza, trivalent, adjuvanted 10/03/2019, 10/15/2019      Health Maintenance:         Topic Date Due   • Colorectal Cancer Screening  05/20/2024   • Breast Cancer Screening: Mammogram  12/09/2024   • Hepatitis C Screening  Completed         Topic Date Due   • COVID-19 Vaccine (1) Never done   • Influenza Vaccine (1) 09/01/2023      Medicare Screening Tests and Risk Assessments:     Dina Parker is here for her Subsequent Wellness visit. Health Risk Assessment:   Patient rates overall health as fair. Patient feels that their physical health rating is slightly better. Patient is satisfied with their life. Eyesight was rated as same. Hearing was rated as same. Patient feels that their emotional and mental health rating is slightly better. Patients states they are never, rarely angry. Patient states they are sometimes unusually tired/fatigued. Pain experienced in the last 7 days has been none. Patient states that she has experienced weight loss or gain in last 6 months. Depression Screening:   PHQ-2 Score: 0      Fall Risk Screening: In the past year, patient has experienced: no history of falling in past year      Urinary Incontinence Screening:   Patient has not leaked urine accidently in the last six months. Home Safety:  Patient does not have trouble with stairs inside or outside of their home. Patient has working smoke alarms and has working carbon monoxide detector. Home safety hazards include: none. Nutrition:   Current diet is Regular. Medications:   Patient is currently taking over-the-counter supplements. OTC medications include: see medication list. Patient is able to manage medications. Activities of Daily Living (ADLs)/Instrumental Activities of Daily Living (IADLs):   Walk and transfer into and out of bed and chair?: Yes  Dress and groom yourself?: Yes    Bathe or shower yourself?: Yes    Feed yourself? Yes  Do your laundry/housekeeping?: Yes  Manage your money, pay your bills and track your expenses?: Yes  Make your own meals?: Yes    Do your own shopping?: Yes    Previous Hospitalizations:   Any hospitalizations or ED visits within the last 12 months?: Yes      Hospitalization Comments: Surgery for to pull up her colon.     Advance Care Planning:   Living will: No    Durable POA for healthcare: No    Advanced directive: Yes    Advanced directive counseling given: No    Five wishes given: Yes    Patient declined ACP directive: Yes      Cognitive Screening:   Provider or family/friend/caregiver concerned regarding cognition?: No    PREVENTIVE SCREENINGS      Cardiovascular Screening:    General: Screening Not Indicated and History Lipid Disorder      Diabetes Screening:     General: Screening Current      Colorectal Cancer Screening:     General: Screening Current      Breast Cancer Screening:     General: Screening Current      Cervical Cancer Screening:    General: Screening Not Indicated      Osteoporosis Screening:    General: Screening Not Indicated and History Osteoporosis      Abdominal Aortic Aneurysm (AAA) Screening:        General: Screening Not Indicated      Lung Cancer Screening:     General: Screening Not Indicated      Hepatitis C Screening:    General: Screening Current    Screening, Brief Intervention, and Referral to Treatment (SBIRT)    Screening  Typical number of drinks in a day: 0  Typical number of drinks in a week: 0  Interpretation: Low risk drinking behavior. Single Item Drug Screening:  How often have you used an illegal drug (including marijuana) or a prescription medication for non-medical reasons in the past year? never    Single Item Drug Screen Score: 0  Interpretation: Negative screen for possible drug use disorder    No results found. Physical Exam:     /76   Pulse 80   Temp 97.7 °F (36.5 °C) (Tympanic)   Ht 4' 11" (1.499 m)   Wt 63.5 kg (140 lb)   SpO2 98%   BMI 28.28 kg/m²     Physical Exam  Vitals and nursing note reviewed. Constitutional:       Appearance: She is well-developed. HENT:      Head: Normocephalic and atraumatic. Right Ear: External ear normal.      Left Ear: External ear normal.      Nose: Nose normal.   Eyes:      Conjunctiva/sclera: Conjunctivae normal.   Cardiovascular:      Rate and Rhythm: Normal rate and regular rhythm. Heart sounds: Normal heart sounds. Pulmonary:      Effort: Pulmonary effort is normal.      Breath sounds: Normal breath sounds. Abdominal:      General: Bowel sounds are normal.      Palpations: Abdomen is soft.    Musculoskeletal: General: Normal range of motion. Cervical back: Normal range of motion and neck supple. Skin:     General: Skin is warm and dry. Neurological:      Mental Status: She is alert and oriented to person, place, and time. Psychiatric:         Behavior: Behavior normal.         Thought Content:  Thought content normal.         Judgment: Judgment normal.          Ellen Mei PA-C

## 2023-07-28 NOTE — ASSESSMENT & PLAN NOTE
Start wellbutrin. .Directions for use and possible side effects discussed and patient verbalized understanding of these.

## 2023-10-16 ENCOUNTER — OFFICE VISIT (OUTPATIENT)
Dept: URGENT CARE | Facility: CLINIC | Age: 73
End: 2023-10-16
Payer: MEDICARE

## 2023-10-16 ENCOUNTER — APPOINTMENT (OUTPATIENT)
Dept: RADIOLOGY | Facility: CLINIC | Age: 73
End: 2023-10-16
Payer: MEDICARE

## 2023-10-16 VITALS
OXYGEN SATURATION: 96 % | WEIGHT: 142 LBS | TEMPERATURE: 97.3 F | HEIGHT: 60 IN | BODY MASS INDEX: 27.88 KG/M2 | DIASTOLIC BLOOD PRESSURE: 90 MMHG | SYSTOLIC BLOOD PRESSURE: 191 MMHG | RESPIRATION RATE: 22 BRPM | HEART RATE: 96 BPM

## 2023-10-16 DIAGNOSIS — R06.02 SHORTNESS OF BREATH: ICD-10-CM

## 2023-10-16 DIAGNOSIS — R05.1 ACUTE COUGH: ICD-10-CM

## 2023-10-16 DIAGNOSIS — I10 ELEVATED BLOOD PRESSURE READING IN OFFICE WITH DIAGNOSIS OF HYPERTENSION: ICD-10-CM

## 2023-10-16 DIAGNOSIS — R06.2 WHEEZING: ICD-10-CM

## 2023-10-16 DIAGNOSIS — J22 ACUTE LOWER RESPIRATORY TRACT INFECTION: Primary | ICD-10-CM

## 2023-10-16 LAB
SARS-COV-2 AG UPPER RESP QL IA: NEGATIVE
VALID CONTROL: NORMAL

## 2023-10-16 PROCEDURE — 94640 AIRWAY INHALATION TREATMENT: CPT | Performed by: FAMILY MEDICINE

## 2023-10-16 PROCEDURE — 87811 SARS-COV-2 COVID19 W/OPTIC: CPT | Performed by: NURSE PRACTITIONER

## 2023-10-16 PROCEDURE — 96372 THER/PROPH/DIAG INJ SC/IM: CPT | Performed by: FAMILY MEDICINE

## 2023-10-16 PROCEDURE — G0463 HOSPITAL OUTPT CLINIC VISIT: HCPCS | Performed by: FAMILY MEDICINE

## 2023-10-16 PROCEDURE — 99214 OFFICE O/P EST MOD 30 MIN: CPT | Performed by: FAMILY MEDICINE

## 2023-10-16 PROCEDURE — 71046 X-RAY EXAM CHEST 2 VIEWS: CPT

## 2023-10-16 RX ORDER — AZITHROMYCIN 250 MG/1
TABLET, FILM COATED ORAL
Qty: 6 TABLET | Refills: 0 | Status: SHIPPED | OUTPATIENT
Start: 2023-10-16 | End: 2023-10-20

## 2023-10-16 RX ORDER — IPRATROPIUM BROMIDE AND ALBUTEROL SULFATE 2.5; .5 MG/3ML; MG/3ML
3 SOLUTION RESPIRATORY (INHALATION) ONCE
Status: COMPLETED | OUTPATIENT
Start: 2023-10-16 | End: 2023-10-16

## 2023-10-16 RX ORDER — METHYLPREDNISOLONE SODIUM SUCCINATE 40 MG/ML
60 INJECTION, POWDER, LYOPHILIZED, FOR SOLUTION INTRAMUSCULAR; INTRAVENOUS ONCE
Status: COMPLETED | OUTPATIENT
Start: 2023-10-16 | End: 2023-10-16

## 2023-10-16 RX ORDER — ALBUTEROL SULFATE 90 UG/1
2 AEROSOL, METERED RESPIRATORY (INHALATION) EVERY 6 HOURS PRN
Qty: 8.5 G | Refills: 0 | Status: SHIPPED | OUTPATIENT
Start: 2023-10-16

## 2023-10-16 RX ORDER — PREDNISONE 10 MG/1
TABLET ORAL
Qty: 24 TABLET | Refills: 0 | Status: SHIPPED | OUTPATIENT
Start: 2023-10-16

## 2023-10-16 RX ADMIN — METHYLPREDNISOLONE SODIUM SUCCINATE 60 MG: 40 INJECTION, POWDER, LYOPHILIZED, FOR SOLUTION INTRAMUSCULAR; INTRAVENOUS at 19:30

## 2023-10-16 RX ADMIN — IPRATROPIUM BROMIDE AND ALBUTEROL SULFATE 3 ML: 2.5; .5 SOLUTION RESPIRATORY (INHALATION) at 19:33

## 2023-10-16 NOTE — PROGRESS NOTES
[facility]        NAME: Mary Lou Blake is a 81 y.o. female  : 1950    MRN: [id number]  DATE: 2023  TIME: 7:53 PM    Assessment and Plan   Acute lower respiratory tract infection [J22]  1. Acute lower respiratory tract infection  azithromycin (ZITHROMAX) 250 mg tablet    predniSONE 10 mg tablet    albuterol (ProAir HFA) 90 mcg/act inhaler      2. Shortness of breath  XR chest pa & lateral    Poct Covid 19 Rapid Antigen Test    methylPREDNISolone sodium succinate (Solu-MEDROL) injection 60 mg    ipratropium-albuterol (DUO-NEB) 0.5-2.5 mg/3 mL inhalation solution 3 mL    azithromycin (ZITHROMAX) 250 mg tablet    predniSONE 10 mg tablet    albuterol (ProAir HFA) 90 mcg/act inhaler      3. Wheezing  methylPREDNISolone sodium succinate (Solu-MEDROL) injection 60 mg    ipratropium-albuterol (DUO-NEB) 0.5-2.5 mg/3 mL inhalation solution 3 mL    azithromycin (ZITHROMAX) 250 mg tablet    predniSONE 10 mg tablet    albuterol (ProAir HFA) 90 mcg/act inhaler      4. Acute cough  azithromycin (ZITHROMAX) 250 mg tablet    predniSONE 10 mg tablet    albuterol (ProAir HFA) 90 mcg/act inhaler      5. Elevated blood pressure reading in office with diagnosis of hypertension              Patient Instructions       Follow up with PCP in 3-5 days. Proceed to  ER if symptoms worsen. Your xray was preliminarily read by your provider. A radiologist will read the xray and you will be notified if it is abnormal.    You are prescribed azithromycin, prednisone (start 10/17/23), albuterol MDI  You are to take plain robitussin for cough. Do NOT take the tessalon. Do NOT take a cough suppressant  Drink plenty of water  Follow up with your PCp in 3-5 days   Go to the ED if your symptoms worsen.        You appear to have an umbilical hernia - see your PCP and have it evaluated       Chief Complaint     Chief Complaint   Patient presents with    Nasal Congestion     For one week     Shortness of Breath     For 1 week, now increased wheezing. Also feels bulge in abd when coughing         History of Present Illness       This is a 81year old female who states has had a slightly productive cough with wheezing, sweats, shortness of breath x 1 week. She has been taking tessalon for cough w/o any relief. She denies testing herself for covid. She states she thought it was her allergies. She denies lung disease, fevers, chills, n/v/d. She has not contacted her PCP. She states her stomach feels a bulge when coughing. Pt states she has not taken her blood pressure medication today because "she didn't feel well". Review of Systems   Review of Systems   Constitutional: Negative. HENT:  Positive for congestion and rhinorrhea. Eyes: Negative. Respiratory:  Positive for cough, chest tightness, shortness of breath and wheezing. Cardiovascular: Negative. Gastrointestinal: Negative. Endocrine: Negative. Genitourinary: Negative. Musculoskeletal: Negative. Skin: Negative. Allergic/Immunologic: Negative. Neurological: Negative. Hematological: Negative. Psychiatric/Behavioral: Negative.            Current Medications       Current Outpatient Medications:     albuterol (ProAir HFA) 90 mcg/act inhaler, Inhale 2 puffs every 6 (six) hours as needed for wheezing or shortness of breath, Disp: 8.5 g, Rfl: 0    azithromycin (ZITHROMAX) 250 mg tablet, Take 2 tablets today then 1 tablet daily x 4 days, Disp: 6 tablet, Rfl: 0    buPROPion (WELLBUTRIN XL) 150 mg 24 hr tablet, Take 1 tablet (150 mg total) by mouth every morning, Disp: 30 tablet, Rfl: 5    butalbital-acetaminophen-caffeine (FIORICET,ESGIC) -40 mg per tablet, Take 1 tablet by mouth every 6 (six) hours as needed for headaches, Disp: 60 tablet, Rfl: 0    Cholecalciferol (VITAMIN D PO), Take 5,000 Units by mouth in the morning, Disp: , Rfl:     Coenzyme Q10 (COQ10) 100 MG CAPS, Take by mouth in the morning, Disp: , Rfl: ezetimibe-simvastatin (VYTORIN) 10-40 mg per tablet, TAKE 1 TABLET BY MOUTH DAILY AT BEDTIME, Disp: 90 tablet, Rfl: 1    fenofibrate micronized (ANTARA) 130 MG capsule, TAKE 1 CAPSULE (130 MG TOTAL) BY MOUTH DAILY WITH BREAKFAST, Disp: 90 capsule, Rfl: 1    FLUoxetine (PROzac) 20 mg capsule, Take 1 capsule (20 mg total) by mouth daily, Disp: 90 capsule, Rfl: 1    levothyroxine 50 mcg tablet, TAKE 1 TABLET (50 MCG TOTAL) BY MOUTH DAILY, Disp: 90 tablet, Rfl: 1    Magnesium 100 MG TABS, Take by mouth in the morning, Disp: , Rfl:     metoprolol succinate (Toprol XL) 100 mg 24 hr tablet, Take 1 tablet (100 mg total) by mouth daily, Disp: 30 tablet, Rfl: 5    Multiple Vitamin (MULTI-VITAMIN DAILY PO), once daily, Disp: , Rfl:     naproxen (NAPROSYN) 500 mg tablet, if needed, Disp: , Rfl:     Omega-3 1000 MG CAPS, Take by mouth in the morning, Disp: , Rfl:     Potassium Gluconate 80 MG TABS, Take by mouth in the morning, Disp: , Rfl:     predniSONE 10 mg tablet, Take 5 tabs po x 2 days; 4 tabs po x 2 days; 3 tabs po x 1 day; 2 tabs po x 1 day. 1 tab po x 1 day., Disp: 24 tablet, Rfl: 0    Progesterone 100 MG CAPS, Take 150 mg by mouth in the morning, Disp: , Rfl:   No current facility-administered medications for this visit. Current Allergies     Allergies as of 10/16/2023 - Reviewed 10/16/2023   Allergen Reaction Noted    Bee venom  06/29/2020    Latex  05/14/2019    Pollen extract Other (See Comments) 06/28/2023            The following portions of the patient's history were reviewed and updated as appropriate: allergies, current medications, past family history, past medical history, past social history, past surgical history and problem list.     Past Medical History:   Diagnosis Date    Allergic     Seasonal    Arthritis ?     Depression     Disease of thyroid gland     Diverticulitis of colon About 10 yrs ago    Severe    Headache(784.0) When I was 17 yrs old    Migraines    Hypercholesteremia     Migraine Osteoporosis        Past Surgical History:   Procedure Laterality Date    COLON SURGERY  2021    Prolapse rectim    FINGER FRACTURE SURGERY      FRACTURE SURGERY  2015    Lower left plate,9 screws,2 pins    HYSTERECTOMY      LEG SURGERY      RECTAL PROLAPSE REPAIR      TOTAL ABDOMINAL HYSTERECTOMY W/ BILATERAL SALPINGOOPHORECTOMY         Family History   Problem Relation Age of Onset    Vision loss Mother     Glaucoma Mother     Cancer Father     Asthma Father         Legionaries disease    COPD Father         Legionnaires disease    Hearing loss Father     Ovarian cancer Maternal Grandmother     No Known Problems Paternal Grandmother     No Known Problems Paternal Aunt     No Known Problems Paternal Aunt          Medications have been verified. Objective   BP (!) 191/90 Comment: did not take bp meds today  Pulse 96   Temp (!) 97.3 °F (36.3 °C)   Resp 22   Ht 5' (1.524 m)   Wt 64.4 kg (142 lb)   SpO2 96%   BMI 27.73 kg/m²   No LMP recorded. Patient has had a hysterectomy. Physical Exam     Physical Exam  Vitals and nursing note reviewed. Constitutional:       General: She is not in acute distress. Appearance: She is well-developed and normal weight. She is not ill-appearing, toxic-appearing or diaphoretic. HENT:      Head: Normocephalic and atraumatic. Mouth/Throat:      Mouth: Mucous membranes are moist.      Pharynx: Oropharynx is clear. Eyes:      Extraocular Movements: Extraocular movements intact. Pupils: Pupils are equal, round, and reactive to light. Cardiovascular:      Rate and Rhythm: Normal rate and regular rhythm. Pulmonary:      Effort: Pulmonary effort is normal.      Breath sounds: Examination of the right-upper field reveals wheezing. Examination of the left-upper field reveals wheezing. Examination of the right-middle field reveals wheezing. Examination of the left-middle field reveals wheezing. Examination of the right-lower field reveals wheezing. Examination of the left-lower field reveals wheezing. Wheezing present. Musculoskeletal:         General: Normal range of motion. Cervical back: Normal range of motion and neck supple. Skin:     General: Skin is warm and dry. Capillary Refill: Capillary refill takes less than 2 seconds. Neurological:      General: No focal deficit present. Mental Status: She is alert and oriented to person, place, and time. Psychiatric:         Mood and Affect: Mood normal.         Behavior: Behavior normal.             Ambulatory pulse ox 89-90 % room air  Sitting 96%   room air  Pt is in no acute distress. CXR preliminary reading   Right ? Elevated diaphragm vs hiatal hernia  Lung fields appear normal   Waiting on rad read      Reassessment during duoneb:   decreased wheezing. Improved airflow in lungs. Pt states does feel better. Pt given strict instructions to take blood pressure medications when she gets home. Ambulatory pulse ox 94% rooom air. Stable for discharge.

## 2023-10-16 NOTE — PATIENT INSTRUCTIONS
Your xray was preliminarily read by your provider. A radiologist will read the xray and you will be notified if it is abnormal.    You are prescribed azithromycin, prednisone (start 10/17/23), albuterol MDI  You are to take plain robitussin for cough. Do NOT take the tessalon. Do NOT take a cough suppressant  Drink plenty of water  Follow up with your PCp in 3-5 days   Go to the ED if your symptoms worsen.        You appear to have an umbilical hernia - see your PCP and have it evaluated

## 2023-10-24 ENCOUNTER — OFFICE VISIT (OUTPATIENT)
Dept: ENDOCRINOLOGY | Facility: CLINIC | Age: 73
End: 2023-10-24
Payer: MEDICARE

## 2023-10-24 VITALS
WEIGHT: 139.8 LBS | HEART RATE: 77 BPM | HEIGHT: 60 IN | SYSTOLIC BLOOD PRESSURE: 130 MMHG | DIASTOLIC BLOOD PRESSURE: 88 MMHG | BODY MASS INDEX: 27.45 KG/M2 | OXYGEN SATURATION: 97 %

## 2023-10-24 DIAGNOSIS — E28.319 PREMATURE MENOPAUSE: ICD-10-CM

## 2023-10-24 DIAGNOSIS — E55.9 VITAMIN D INSUFFICIENCY: ICD-10-CM

## 2023-10-24 DIAGNOSIS — M81.0 OSTEOPOROSIS, UNSPECIFIED OSTEOPOROSIS TYPE, UNSPECIFIED PATHOLOGICAL FRACTURE PRESENCE: Primary | ICD-10-CM

## 2023-10-24 DIAGNOSIS — E55.9 VITAMIN D DEFICIENCY: ICD-10-CM

## 2023-10-24 DIAGNOSIS — E03.9 HYPOTHYROIDISM, UNSPECIFIED TYPE: ICD-10-CM

## 2023-10-24 PROCEDURE — 99214 OFFICE O/P EST MOD 30 MIN: CPT | Performed by: STUDENT IN AN ORGANIZED HEALTH CARE EDUCATION/TRAINING PROGRAM

## 2023-10-24 NOTE — PROGRESS NOTES
Kaycee Screen 81 y.o. female MRN: [id number]    Encounter: [id number]      Assessment/Plan     1. Osteoporosis with history of fractures - challenging case. Williams Dandy has history of long-term bisphosphonate use with boniva over 20 years duration. T-score at L-spine remains <-3, although overall BMD appears to be stable since 2016. I would like to update her DXA from prior study, if possible. And updated labs are being requested with inclusion of BTM. I will contact patient with results and recommendations    2. History of chronic bisphosphonate use    3. History of dental implants - no immediate plan for any further dental surgeries, however patient advised by periodontist to not pursue additional treatment with anti-resorptive therapy. A risk:benefit conversation will have to be considered    4. Premature menopause - this is likely the chief contributor to state of her osteoporosis    Problem List Items Addressed This Visit    None  Visit Diagnoses     Osteoporosis, unspecified osteoporosis type, unspecified pathological fracture presence    -  Primary    Relevant Orders    DXA bone density spine hip and pelvis    C-Telopeptide    Comprehensive metabolic panel Clinic Collect    Calcium, ionized    PTH, intact    Vitamin D 25 hydroxy    Phosphorus    Vitamin D insufficiency        Premature menopause        Hypothyroidism, unspecified type        Vitamin D deficiency            RTC 6-mo    CC: Osteoporosis    History of Present Illness     HPI:    Williams Dandy returns today for follow up osteoporosis. Recently reports symptoms of fatigue and wheezing. She is using an inhaler at home. She is on oral calcium and Vit D supplementation. No recent falls, and no fractures. She has history of long-term bisphosphonate use with boniva >20 years. Review of Systems   Constitutional:  Negative for diaphoresis and unexpected weight change. HENT:  Negative for trouble swallowing and voice change.     Respiratory:  Negative for shortness of breath. Cardiovascular:  Negative for chest pain. Gastrointestinal:  Negative for nausea and vomiting. Endocrine: Negative for polydipsia and polyuria. Psychiatric/Behavioral:  Negative for agitation. All other systems reviewed and are negative. Historical Information   Past Medical History:   Diagnosis Date   • Allergic     Seasonal   • Arthritis ? • Depression    • Disease of thyroid gland    • Diverticulitis of colon About 10 yrs ago    Severe   • Headache(784.0) When I was 17 yrs old    Migraines   • Hypercholesteremia    • Migraine    • Osteoporosis      Past Surgical History:   Procedure Laterality Date   • COLON SURGERY      Prolapse rectim   • FINGER FRACTURE SURGERY     • FRACTURE SURGERY      Lower left plate,9 screws,2 pins   • HYSTERECTOMY     • LEG SURGERY     • RECTAL PROLAPSE REPAIR     • TOTAL ABDOMINAL HYSTERECTOMY W/ BILATERAL SALPINGOOPHORECTOMY       Social History   Social History     Substance and Sexual Activity   Alcohol Use Not Currently    Comment: social     Social History     Substance and Sexual Activity   Drug Use No     Social History     Tobacco Use   Smoking Status Former   • Packs/day: 0.25   • Years: 15.00   • Total pack years: 3.75   • Types: Cigarettes   • Quit date: 2023   • Years since quittin.5   Smokeless Tobacco Never   Tobacco Comments    Started smoking at 17yrs. Stopped at [employer].  Smoked for a few years later in life     Family History:   Family History   Problem Relation Age of Onset   • Vision loss Mother    • Glaucoma Mother    • Cancer Father    • Asthma Father         Legionaries disease   • COPD Father         Legionnaires disease   • Hearing loss Father    • Ovarian cancer Maternal Grandmother    • No Known Problems Paternal Grandmother    • No Known Problems Paternal Aunt    • No Known Problems Paternal Aunt        Meds/Allergies   Current Outpatient Medications   Medication Sig Dispense Refill   • buPROPion (WELLBUTRIN XL) 150 mg 24 hr tablet Take 1 tablet (150 mg total) by mouth every morning 30 tablet 5   • butalbital-acetaminophen-caffeine (FIORICET,ESGIC) -40 mg per tablet Take 1 tablet by mouth every 6 (six) hours as needed for headaches 60 tablet 0   • Cholecalciferol (VITAMIN D PO) Take 5,000 Units by mouth in the morning     • Coenzyme Q10 (COQ10) 100 MG CAPS Take by mouth in the morning     • ezetimibe-simvastatin (VYTORIN) 10-40 mg per tablet TAKE 1 TABLET BY MOUTH DAILY AT BEDTIME 90 tablet 1   • fenofibrate micronized (ANTARA) 130 MG capsule TAKE 1 CAPSULE (130 MG TOTAL) BY MOUTH DAILY WITH BREAKFAST 90 capsule 1   • FLUoxetine (PROzac) 20 mg capsule Take 1 capsule (20 mg total) by mouth daily 90 capsule 1   • levothyroxine 50 mcg tablet TAKE 1 TABLET (50 MCG TOTAL) BY MOUTH DAILY 90 tablet 1   • Magnesium 100 MG TABS Take by mouth in the morning     • metoprolol succinate (Toprol XL) 100 mg 24 hr tablet Take 1 tablet (100 mg total) by mouth daily 30 tablet 5   • Multiple Vitamin (MULTI-VITAMIN DAILY PO) once daily     • Omega-3 1000 MG CAPS Take by mouth in the morning     • Potassium Gluconate 80 MG TABS Take by mouth in the morning     • Progesterone 100 MG CAPS Take 150 mg by mouth in the morning     • albuterol (2.5 mg/3 mL) 0.083 % nebulizer solution Take 3 mL (2.5 mg total) by nebulization 2 (two) times a day 180 mL 5   • albuterol (Ventolin HFA) 90 mcg/act inhaler Inhale 2 puffs every 4 (four) hours as needed for wheezing 18 g 0   • budesonide-formoterol (Symbicort) 160-4.5 mcg/act inhaler Inhale 2 puffs 2 (two) times a day Rinse mouth after use. 10.2 g 5   • naproxen (NAPROSYN) 500 mg tablet if needed       No current facility-administered medications for this visit.      Allergies   Allergen Reactions   • Bee Venom    • Latex    • Pollen Extract Other (See Comments)     Seasonal allergies       Objective   Vitals: Blood pressure 130/88, pulse 77, height 5' (1.524 m), weight 63.4 kg (139 lb 12.8 oz), SpO2 97 %, not currently breastfeeding. Physical Exam  Vitals reviewed. Constitutional:       Appearance: Normal appearance. HENT:      Head: Normocephalic and atraumatic. Nose: Nose normal.   Eyes:      Conjunctiva/sclera: Conjunctivae normal.   Pulmonary:      Effort: Pulmonary effort is normal. No respiratory distress. Skin:     General: Skin is warm and dry. Neurological:      General: No focal deficit present. Mental Status: She is alert. Psychiatric:         Mood and Affect: Mood normal.         Behavior: Behavior normal.         The history was obtained from the review of the chart, patient.     Lab Results:     Component      Latest Ref Rng 7/25/2023   Sodium      135 - 147 mmol/L 140    Potassium      3.5 - 5.3 mmol/L 4.0    Chloride      96 - 108 mmol/L 110 (H)    CO2      21 - 32 mmol/L 30    Anion Gap      mmol/L 0    BUN      5 - 25 mg/dL 13    Creatinine      0.60 - 1.30 mg/dL 0.80    GLUCOSE FASTING      65 - 99 mg/dL 91    Calcium      8.3 - 10.1 mg/dL 9.6    AST      5 - 45 U/L 16    ALT      12 - 78 U/L 24    Alkaline Phosphatase      46 - 116 U/L 61    Total Protein      6.4 - 8.4 g/dL 7.2    Albumin      3.5 - 5.0 g/dL 3.7    TOTAL BILIRUBIN      0.20 - 1.00 mg/dL 0.38    eGFR      ml/min/1.73sq m 74    IGA      87 - 352 mg/dL 169    GLIADIN IGA ANTIBODIES      0 - 19 units 4    GLIADIN IGG ANTIBODIES      0 - 19 units <1    Tissue Transglut Ab IGG      0 - 5 U/mL <2    TTG IGA      0 - 3 U/mL <2    ENDOMYSIAL IGA ANTIBODY      Negative  Negative    SODIUM 24 HOUR URINE      40 - 220 mmol/24 hrs 161    24 HR URINE VOLUME      mL 1,150    24 HR URINE VOLUME      mL 1,150    PERIOD      Hours 24    CALCIUM 24 HOUR URINE      42 - 353 mg/24 hrs 105.8    CREATININE 24 HOUR UR      0.6 - 1.8 g/24Hr 0.9    TOTAL URINE VOLUME      ml 1,150    PARATHYROID HORMONE      12.0 - 88.0 pg/mL 55.3    Vit D, 25-Hydroxy      30.0 - 100.0 ng/mL 59.7    Calcium, Ionized      1.12 - 1.32 mmol/L 1.24    Phosphorus      2.3 - 4.1 mg/dL 3.3    Magnesium      1.6 - 2.6 mg/dL 2.1       Legend:  (H) High    Component      Latest Ref Rng & Units 2/17/2023   Sodium      135 - 147 mmol/L 139   Potassium      3.5 - 5.3 mmol/L 3.6   Chloride      96 - 108 mmol/L 107   CO2      21 - 32 mmol/L 27   Anion Gap      4 - 13 mmol/L 5   BUN      5 - 25 mg/dL 9   Creatinine      0.60 - 1.30 mg/dL 0.79   Glucose, Random      65 - 140 mg/dL 87   Calcium      8.3 - 10.1 mg/dL 10.2 (H)   AST      5 - 45 U/L 26   ALT      12 - 78 U/L 30   Alkaline Phosphatase      46 - 116 U/L 57   Total Protein      6.4 - 8.4 g/dL 7.5   Albumin      3.5 - 5.0 g/dL 4.0   TOTAL BILIRUBIN      0.20 - 1.00 mg/dL 0.31   eGFR      ml/min/1.73sq m 76   CREATININE 24 HOUR UR      0.6 - 1.8 g/24Hr 0.8   TOTAL URINE VOLUME      ml 1,700   24 HR URINE VOLUME      mL 1,700   CALCIUM 24 HOUR URINE      42 - 353 mg/24 hrs 153   CORTISOL,F,UG/L,U      Undefined ug/L 19   CORTISOL 24H URINARY FREE      6 - 42 ug/24 hr 32   PARATHYROID HORMONE      18.4 - 80.1 pg/mL 40.5   Vit D, 25-Hydroxy      30.0 - 100.0 ng/mL 45.6   Phosphorus      2.3 - 4.1 mg/dL 2.4   Calcium, Ionized      1.12 - 1.32 mmol/L 1.33 (H)   DHEA-SO4      20.4 - 186.6 ug/dL 21.6     FeCa 0.01    Component      Latest Ref Rng & Units 12/30/2022 12/30/2022          12:42 PM 12:42 PM   Sodium      135 - 147 mmol/L 143    Potassium      3.5 - 5.3 mmol/L 4.5    Chloride      96 - 108 mmol/L 110 (H)    CO2      21 - 32 mmol/L 28    Anion Gap      4 - 13 mmol/L 5    BUN      5 - 25 mg/dL 10    Creatinine      0.60 - 1.30 mg/dL 0.78    GLUCOSE FASTING      65 - 99 mg/dL 91    Calcium      8.3 - 10.1 mg/dL 10.4 (H)    AST      5 - 45 U/L 22    ALT      12 - 78 U/L 30    Alkaline Phosphatase      46 - 116 U/L 68    Total Protein      6.4 - 8.4 g/dL 7.5 7.3   Albumin      3.5 - 5.0 g/dL 4.0    TOTAL BILIRUBIN      0.20 - 1.00 mg/dL 0.30    eGFR      ml/min/1.73sq m 77    Albumin/Globulin Ratio      1.10 - 1.80  1.48   ALBUMIN ELP      52.0 - 65.0 %  59.6   ALBUMIN CONC ELP      3.50 - 5.00 g/dl  4.35   ALPHA 1      2.5 - 5.0 %  4.0   ALPHA 1 CONC ELP      0.10 - 0.40 g/dL  0.29   ALPHA 2      7.0 - 13.0 %  11.1   ALPHA 2 CONC ELP      0.40 - 1.20 g/dL  0.81   BETA-1      5.0 - 13.0 %  5.4   BETA 1 CONC ELP      0.40 - 0.80 g/dL  0.39 (L)   BETA-2      2.0 - 8.0 %  4.7   BETA 2 CONC ELP      0.20 - 0.50 g/dL  0.34   GAMMA GLOBULIN      12.0 - 22.0 %  15.2   GAMMA CONC ELP      0.50 - 1.60 g/dL  1.11   SPEP INTERPRETATION        See Comment   Urine Protein      2.0 - 17.5 mg/dL 18.0 (H)    ALBUMIN URINE ELP      % 100.0    ALPHA 1 URINE      % 0.0    ALPHA 2 URINE      % 0.0    BETA URINE      % 0.0    GAMMA GLOBULIN URINE      % 0.0    UPEP INTERPRETATION       See Comment    Hemoglobin A1C      Normal 3.8-5.6%; PreDiabetic 5.7-6.4%; Diabetic >=6.5%; Glycemic control for adults with diabetes <7.0% % 5.3    eAG, EST AVG Glucose      mg/dl 105    TESTOSTERONE FREE      0.0 - 4.2 pg/mL 2.1    Testosterone, Total, LC/MS      3 - 67 ng/dL 15    Vit D, 25-Hydroxy      30.0 - 100.0 ng/mL 22.1 (L)    PARATHYROID HORMONE      18.4 - 80.1 pg/mL 76.9    Phosphorus      2.3 - 4.1 mg/dL 2.9    C-TELOPEPTIDE,SERUM      pg/mL 507    Cortisol, Random      ug/dL 21.0    DHEA-SO4      20.4 - 186.6 ug/dL 11.4 (L)    PROGESTERONE LEVEL      ng/mL <0.20 (L)    ESTRADIOL LEVEL      pg/mL 13.0    ESTRONE      0 - 125 pg/mL <6    TSH 3RD GENERATON      0.450 - 4.500 uIU/mL 2.470    T3, Free      2.30 - 4.20 pg/mL 2.84    Free T4      0.76 - 1.46 ng/dL 0.81          Imaging Studies:   ?  ?  ? Results for orders placed during the hospital encounter of 10/03/22    DXA bone density spine hip and pelvis    Impression  1. Osteoporosis. 2.  Since a DXA study from 9/29/2020, there has been:  A  STATISTICALLY SIGNIFICANT DECREASE in bone mineral density of  -0.028 g/cm2 (-3.4)% in the hips.         3.  The 10 year risk of hip fracture is 6.2% with the 10 year risk of major osteoporotic fracture being 21.4% as calculated by the [facility]/WHO fracture risk assessment tool (FRAX). 4.  The current NOF guidelines recommend treating patients with a T-score of -2.5 or less in the lumbar spine or hips, or in post-menopausal women and men over the age of 48 with low bone mass (osteopenia) and a FRAX 10 year risk score of >3% for hip  fracture and/or >20% for major osteoporotic fracture. 5.  The NOF recommends follow-up DXA in 1-2 years after initiating therapy for osteoporosis and every 2 years thereafter. More frequent evaluation is appropriate for patients with conditions associated with rapid bone loss, such as glucocorticoid  therapy. The interval between DXA screenings may be longer for individuals without major risk factors and initial T-score in the normal or upper low bone mass range. The FRAX algorithm has certain limitations:  -FRAX has not been validated in patients currently or previously treated with pharmacotherapy for osteoporosis. In such patients, clinical judgment must be exercised in interpreting FRAX scores. -Prior hip, vertebral and humeral fragility fractures appear to confer greater risk of subsequent fracture than fractures at other sites (this is especially true for individuals with severe vertebral fractures), but quantification of this incremental  risk is not possible with FRAX. -FRAX underestimates fracture risk in patients with history of multiple fragility fractures. -FRAX may underestimate fracture risk in patients with history of frequent falls.  -It is not appropriate to use FRAX to monitor treatment response.       WHO CLASSIFICATION:  Normal (a T-score of -1.0 or higher)  Low bone mineral density (a T-score of less than -1.0 but higher than -2.5)  Osteoporosis (a T-score of -2.5 or less)  Severe osteoporosis (a T-score of -2.5 or less with a fragility fracture)    LEAST SIGNIFICANT CHANGE AT 95% C.I:  Lumbar spine: 0.030 gm/cm2 (2.5%). Total hip: 0.015 gm/cm2 (1.7%). Forearm: 0.034 gm/cm2 (5.2%). Workstation performed: [id number]    ?  ?       ? I have personally reviewed pertinent reports. Portions of the record may have been created with voice recognition software. Occasional wrong word or "sound a like" substitutions may have occurred due to the inherent limitations of voice recognition software. Read the chart carefully and recognize, using context, where substitutions have occurred.

## 2023-10-26 ENCOUNTER — HOSPITAL ENCOUNTER (EMERGENCY)
Facility: HOSPITAL | Age: 73
Discharge: HOME | End: 2023-10-26
Attending: EMERGENCY MEDICINE
Payer: MEDICARE

## 2023-10-26 ENCOUNTER — APPOINTMENT (EMERGENCY)
Dept: RADIOLOGY | Facility: HOSPITAL | Age: 73
End: 2023-10-26
Payer: MEDICARE

## 2023-10-26 VITALS
BODY MASS INDEX: 27.29 KG/M2 | HEIGHT: 60 IN | DIASTOLIC BLOOD PRESSURE: 94 MMHG | SYSTOLIC BLOOD PRESSURE: 177 MMHG | TEMPERATURE: 97.6 F | WEIGHT: 139 LBS | OXYGEN SATURATION: 93 % | HEART RATE: 66 BPM | RESPIRATION RATE: 18 BRPM

## 2023-10-26 DIAGNOSIS — R06.00 DYSPNEA: Primary | ICD-10-CM

## 2023-10-26 DIAGNOSIS — R06.2 WHEEZING: ICD-10-CM

## 2023-10-26 LAB
2HR DELTA HS TROPONIN: 0 NG/L
ALBUMIN SERPL BCP-MCNC: 4.2 G/DL (ref 3.5–5)
ALP SERPL-CCNC: 64 U/L (ref 34–104)
ALT SERPL W P-5'-P-CCNC: 29 U/L (ref 7–52)
ANION GAP SERPL CALCULATED.3IONS-SCNC: 6 MMOL/L
AST SERPL W P-5'-P-CCNC: 31 U/L (ref 13–39)
BASOPHILS # BLD AUTO: 0.03 THOUSANDS/ÂΜL (ref 0–0.1)
BASOPHILS NFR BLD AUTO: 0 % (ref 0–1)
BILIRUB SERPL-MCNC: 0.3 MG/DL (ref 0.2–1)
BUN SERPL-MCNC: 15 MG/DL (ref 5–25)
CALCIUM SERPL-MCNC: 9.9 MG/DL (ref 8.4–10.2)
CARDIAC TROPONIN I PNL SERPL HS: 5 NG/L
CARDIAC TROPONIN I PNL SERPL HS: 5 NG/L
CHLORIDE SERPL-SCNC: 106 MMOL/L (ref 96–108)
CO2 SERPL-SCNC: 30 MMOL/L (ref 21–32)
CREAT SERPL-MCNC: 0.61 MG/DL (ref 0.6–1.3)
EOSINOPHIL # BLD AUTO: 0.97 THOUSAND/ÂΜL (ref 0–0.61)
EOSINOPHIL NFR BLD AUTO: 9 % (ref 0–6)
ERYTHROCYTE [DISTWIDTH] IN BLOOD BY AUTOMATED COUNT: 13 % (ref 11.6–15.1)
GFR SERPL CREATININE-BSD FRML MDRD: 92 ML/MIN/1.73SQ M
GLUCOSE SERPL-MCNC: 121 MG/DL (ref 65–140)
HCT VFR BLD AUTO: 48.5 % (ref 34.8–46.1)
HGB BLD-MCNC: 14.9 G/DL (ref 11.5–15.4)
IMM GRANULOCYTES # BLD AUTO: 0.03 THOUSAND/UL (ref 0–0.2)
IMM GRANULOCYTES NFR BLD AUTO: 0 % (ref 0–2)
LYMPHOCYTES # BLD AUTO: 1.47 THOUSANDS/ÂΜL (ref 0.6–4.47)
LYMPHOCYTES NFR BLD AUTO: 14 % (ref 14–44)
MCH RBC QN AUTO: 29.6 PG (ref 26.8–34.3)
MCHC RBC AUTO-ENTMCNC: 30.7 G/DL (ref 31.4–37.4)
MCV RBC AUTO: 96 FL (ref 82–98)
MONOCYTES # BLD AUTO: 0.74 THOUSAND/ÂΜL (ref 0.17–1.22)
MONOCYTES NFR BLD AUTO: 7 % (ref 4–12)
NEUTROPHILS # BLD AUTO: 7.47 THOUSANDS/ÂΜL (ref 1.85–7.62)
NEUTS SEG NFR BLD AUTO: 70 % (ref 43–75)
NRBC BLD AUTO-RTO: 0 /100 WBCS
PLATELET # BLD AUTO: 309 THOUSANDS/UL (ref 149–390)
PMV BLD AUTO: 10.6 FL (ref 8.9–12.7)
POTASSIUM SERPL-SCNC: 4.3 MMOL/L (ref 3.5–5.3)
PROT SERPL-MCNC: 7.3 G/DL (ref 6.4–8.4)
RBC # BLD AUTO: 5.04 MILLION/UL (ref 3.81–5.12)
SODIUM SERPL-SCNC: 142 MMOL/L (ref 135–147)
WBC # BLD AUTO: 10.71 THOUSAND/UL (ref 4.31–10.16)

## 2023-10-26 PROCEDURE — 84484 ASSAY OF TROPONIN QUANT: CPT

## 2023-10-26 PROCEDURE — 94664 DEMO&/EVAL PT USE INHALER: CPT

## 2023-10-26 PROCEDURE — 94645 CONT INHLJ TX EACH ADDL HOUR: CPT

## 2023-10-26 PROCEDURE — 94760 N-INVAS EAR/PLS OXIMETRY 1: CPT

## 2023-10-26 PROCEDURE — 99285 EMERGENCY DEPT VISIT HI MDM: CPT | Performed by: EMERGENCY MEDICINE

## 2023-10-26 PROCEDURE — 71046 X-RAY EXAM CHEST 2 VIEWS: CPT

## 2023-10-26 PROCEDURE — 99285 EMERGENCY DEPT VISIT HI MDM: CPT

## 2023-10-26 PROCEDURE — 85025 COMPLETE CBC W/AUTO DIFF WBC: CPT

## 2023-10-26 PROCEDURE — 96375 TX/PRO/DX INJ NEW DRUG ADDON: CPT

## 2023-10-26 PROCEDURE — 36415 COLL VENOUS BLD VENIPUNCTURE: CPT

## 2023-10-26 PROCEDURE — 94640 AIRWAY INHALATION TREATMENT: CPT

## 2023-10-26 PROCEDURE — 94644 CONT INHLJ TX 1ST HOUR: CPT

## 2023-10-26 PROCEDURE — 96374 THER/PROPH/DIAG INJ IV PUSH: CPT

## 2023-10-26 PROCEDURE — 93005 ELECTROCARDIOGRAM TRACING: CPT

## 2023-10-26 PROCEDURE — 80053 COMPREHEN METABOLIC PANEL: CPT

## 2023-10-26 RX ORDER — SODIUM CHLORIDE FOR INHALATION 0.9 %
12 VIAL, NEBULIZER (ML) INHALATION ONCE
Status: COMPLETED | OUTPATIENT
Start: 2023-10-26 | End: 2023-10-26

## 2023-10-26 RX ORDER — DIAZEPAM 5 MG/ML
2.5 INJECTION, SOLUTION INTRAMUSCULAR; INTRAVENOUS ONCE
Status: COMPLETED | OUTPATIENT
Start: 2023-10-26 | End: 2023-10-26

## 2023-10-26 RX ORDER — ALBUTEROL SULFATE 90 UG/1
2 AEROSOL, METERED RESPIRATORY (INHALATION) EVERY 4 HOURS PRN
Qty: 18 G | Refills: 0 | Status: SHIPPED | OUTPATIENT
Start: 2023-10-26

## 2023-10-26 RX ORDER — METHYLPREDNISOLONE SODIUM SUCCINATE 125 MG/2ML
125 INJECTION, POWDER, LYOPHILIZED, FOR SOLUTION INTRAMUSCULAR; INTRAVENOUS ONCE
Status: COMPLETED | OUTPATIENT
Start: 2023-10-26 | End: 2023-10-26

## 2023-10-26 RX ORDER — PREDNISONE 50 MG/1
50 TABLET ORAL DAILY
Qty: 4 TABLET | Refills: 0 | Status: SHIPPED | OUTPATIENT
Start: 2023-10-26 | End: 2023-10-30

## 2023-10-26 RX ADMIN — IPRATROPIUM BROMIDE 1 MG: 0.5 SOLUTION RESPIRATORY (INHALATION) at 18:17

## 2023-10-26 RX ADMIN — Medication 12 ML: at 18:18

## 2023-10-26 RX ADMIN — Medication 12 ML: at 20:36

## 2023-10-26 RX ADMIN — ALBUTEROL SULFATE 10 MG: 2.5 SOLUTION RESPIRATORY (INHALATION) at 20:36

## 2023-10-26 RX ADMIN — METHYLPREDNISOLONE SODIUM SUCCINATE 125 MG: 125 INJECTION, POWDER, FOR SOLUTION INTRAMUSCULAR; INTRAVENOUS at 18:12

## 2023-10-26 RX ADMIN — ALBUTEROL SULFATE 10 MG: 2.5 SOLUTION RESPIRATORY (INHALATION) at 18:18

## 2023-10-26 RX ADMIN — IPRATROPIUM BROMIDE 1 MG: 0.5 SOLUTION RESPIRATORY (INHALATION) at 20:36

## 2023-10-26 RX ADMIN — DIAZEPAM 2.5 MG: 10 INJECTION, SOLUTION INTRAMUSCULAR; INTRAVENOUS at 18:22

## 2023-10-26 NOTE — ED ATTENDING ATTESTATION
10/26/2023  Ketih Anna DO, saw and evaluated the patient. I have discussed the patient with the resident/non-physician practitioner and agree with the resident's/non-physician practitioner's findings, Plan of Care, and MDM as documented in the resident's/non-physician practitioner's note, except where noted. All available labs and Radiology studies were reviewed. I was present for key portions of any procedure(s) performed by the resident/non-physician practitioner and I was immediately available to provide assistance. At this point I agree with the current assessment done in the Emergency Department. I have conducted an independent evaluation of this patient a history and physical is as follows:    SOB x a few weeks. Hisotry of smoking. Wheezing a few weeks ago, started on steroids and Z pack by urgent care symptoms improved but worsened again. COugh with productive yellow sputume, no fevers or chills. No chest pain, no lightheadedness, dizziness. Diffuse wheezing on exam    Plan:  CBC, CBC, CXR  Mckeon neb  Solumedrol    Labs WNL. CXR shows no acute cardiopulmonary disease. Cardiac workup WNL. Patient with improved symptoms after 2 mckeon nebs. Believe symptoms are 2/2 undiagnosed COPD. Ambulatory pulse ox normal.  Patient feels comfortable going home at this time. Will give script for prednisone    ED Course  ED Course as of 10/27/23 1203   Thu Oct 26, 2023   2006 Patient with improvement of symptoms after first breathing treatment. Patient still has some diffuse wheezing. Satting 93 to 94% on room air.   Will give another heart neb and reassess         Critical Care Time  Procedures

## 2023-10-27 LAB
ATRIAL RATE: 70 BPM
P AXIS: 45 DEGREES
PR INTERVAL: 182 MS
QRS AXIS: 11 DEGREES
QRSD INTERVAL: 88 MS
QT INTERVAL: 396 MS
QTC INTERVAL: 427 MS
T WAVE AXIS: 44 DEGREES
VENTRICULAR RATE: 70 BPM

## 2023-10-27 PROCEDURE — 93010 ELECTROCARDIOGRAM REPORT: CPT | Performed by: INTERNAL MEDICINE

## 2023-10-27 NOTE — DISCHARGE INSTRUCTIONS
You were seen for wheezing and dyspnea. Take the prescribe steroids for the next 4 days. Use the albuterol inhaler every 4-6 hours. Please follow up with the pulmonology office in a week. Return if you develop severe difficulty breathing or wheezing.

## 2023-10-27 NOTE — ED PROVIDER NOTES
History  Chief Complaint   Patient presents with    Shortness of Breath     Pt reports ongoing sob/wheezing pt was given zpack at urgent care but reports it is ineffective     79-year-old woman with relevant PMH anxiety, hypothyroidism, HTN, and HLD presents with dyspnea and wheezing. She reports these symptoms have progressively worsened over the past few weeks. She was given an albuterol inhaler and steroid course last week and reportedly felt better. However, her symptoms returned shortly after the steroids ended. She is having a productive cough. No fever or chills. She is not having chest pain. She has a 15 pack year smoking history but quit many years ago. She denies a history of asthma or COPD. Prior to Admission Medications   Prescriptions Last Dose Informant Patient Reported? Taking?    Cholecalciferol (VITAMIN D PO)  Self Yes No   Sig: Take 5,000 Units by mouth in the morning   Coenzyme Q10 (COQ10) 100 MG CAPS  Self Yes No   Sig: Take by mouth in the morning   FLUoxetine (PROzac) 20 mg capsule   No No   Sig: Take 1 capsule (20 mg total) by mouth daily   Magnesium 100 MG TABS  Self Yes No   Sig: Take by mouth in the morning   Multiple Vitamin (MULTI-VITAMIN DAILY PO)  Self Yes No   Sig: once daily   Omega-3 1000 MG CAPS  Self Yes No   Sig: Take by mouth in the morning   Potassium Gluconate 80 MG TABS  Self Yes No   Sig: Take by mouth in the morning   Progesterone 100 MG CAPS   Yes No   Sig: Take 150 mg by mouth in the morning   albuterol (ProAir HFA) 90 mcg/act inhaler   No No   Sig: Inhale 2 puffs every 6 (six) hours as needed for wheezing or shortness of breath   buPROPion (WELLBUTRIN XL) 150 mg 24 hr tablet   No No   Sig: Take 1 tablet (150 mg total) by mouth every morning   butalbital-acetaminophen-caffeine (FIORICET,ESGIC) -40 mg per tablet   No No   Sig: Take 1 tablet by mouth every 6 (six) hours as needed for headaches   ezetimibe-simvastatin (VYTORIN) 10-40 mg per tablet   No No   Sig: TAKE 1 TABLET BY MOUTH DAILY AT BEDTIME   fenofibrate micronized (ANTARA) 130 MG capsule   No No   Sig: TAKE 1 CAPSULE (130 MG TOTAL) BY MOUTH DAILY WITH BREAKFAST   levothyroxine 50 mcg tablet   No No   Sig: TAKE 1 TABLET (50 MCG TOTAL) BY MOUTH DAILY   metoprolol succinate (Toprol XL) 100 mg 24 hr tablet   No No   Sig: Take 1 tablet (100 mg total) by mouth daily   naproxen (NAPROSYN) 500 mg tablet   Yes No   Sig: if needed   predniSONE 10 mg tablet   No No   Sig: Take 5 tabs po x 2 days; 4 tabs po x 2 days; 3 tabs po x 1 day; 2 tabs po x 1 day. 1 tab po x 1 day. Facility-Administered Medications: None       Past Medical History:   Diagnosis Date    Allergic     Seasonal    Arthritis ? Depression     Disease of thyroid gland     Diverticulitis of colon About 10 yrs ago    Severe    Headache(784.0) When I was 17 yrs old    Migraines    Hypercholesteremia     Migraine     Osteoporosis        Past Surgical History:   Procedure Laterality Date    COLON SURGERY  2021    Prolapse rectim    FINGER FRACTURE SURGERY      FRACTURE SURGERY  2015    Lower left plate,9 screws,2 pins    HYSTERECTOMY      LEG SURGERY      RECTAL PROLAPSE REPAIR      TOTAL ABDOMINAL HYSTERECTOMY W/ BILATERAL SALPINGOOPHORECTOMY         Family History   Problem Relation Age of Onset    Vision loss Mother     Glaucoma Mother     Cancer Father     Asthma Father         Legionaries disease    COPD Father         Legionnaires disease    Hearing loss Father     Ovarian cancer Maternal Grandmother     No Known Problems Paternal Grandmother     No Known Problems Paternal Aunt     No Known Problems Paternal Aunt      I have reviewed and agree with the history as documented.     E-Cigarette/Vaping    E-Cigarette Use Never User      E-Cigarette/Vaping Substances    Nicotine No     THC No     CBD No     Flavoring No     Other No     Unknown No      Social History     Tobacco Use    Smoking status: Former     Packs/day: 0.25     Years: 15.00     Total pack years: 3.75     Types: Cigarettes    Smokeless tobacco: Never   Vaping Use    Vaping Use: Never used   Substance Use Topics    Alcohol use: Not Currently     Comment: social    Drug use: No        Review of Systems    Physical Exam  ED Triage Vitals   Temperature Pulse Respirations Blood Pressure SpO2   10/26/23 1752 10/26/23 1752 10/26/23 1752 10/26/23 1802 10/26/23 1752   97.6 °F (36.4 °C) 80 18 (!) 181/98 91 %      Temp src Heart Rate Source Patient Position - Orthostatic VS BP Location FiO2 (%)   -- 10/26/23 1752 10/26/23 1900 10/26/23 1900 --    Monitor Sitting Left arm       Pain Score       10/26/23 1752       6             Orthostatic Vital Signs  Vitals:    10/26/23 1752 10/26/23 1802 10/26/23 1900 10/26/23 2000   BP:  (!) 181/98 (!) 186/84 (!) 177/94   Pulse: 80  61 66   Patient Position - Orthostatic VS:   Sitting Sitting       Physical Exam  Vitals and nursing note reviewed. Constitutional:       General: She is not in acute distress. Appearance: Normal appearance. She is well-developed. HENT:      Head: Normocephalic and atraumatic. Right Ear: External ear normal.      Left Ear: External ear normal.      Nose: Nose normal. No congestion. Cardiovascular:      Rate and Rhythm: Normal rate and regular rhythm. Pulmonary:      Effort: Pulmonary effort is normal. Tachypnea present. No respiratory distress. Breath sounds: Examination of the right-upper field reveals wheezing. Examination of the left-upper field reveals wheezing. Examination of the right-middle field reveals wheezing. Examination of the left-middle field reveals wheezing. Examination of the right-lower field reveals wheezing. Examination of the left-lower field reveals wheezing. Wheezing present. No decreased breath sounds, rhonchi or rales. Comments: 91% on room air  Abdominal:      Palpations: Abdomen is soft. Tenderness: There is no abdominal tenderness. There is no guarding or rebound.    Musculoskeletal: General: Normal range of motion. Cervical back: Normal range of motion and neck supple. Skin:     General: Skin is warm and dry. Neurological:      Mental Status: She is alert and oriented to person, place, and time. Mental status is at baseline. Psychiatric:         Mood and Affect: Mood is anxious.          Behavior: Behavior normal.         ED Medications  Medications   albuterol inhalation solution 10 mg (10 mg Nebulization Given 10/26/23 1818)   ipratropium (ATROVENT) 0.02 % inhalation solution 1 mg (1 mg Nebulization Given 10/26/23 1817)   sodium chloride 0.9 % inhalation solution 12 mL (12 mL Nebulization Given 10/26/23 1818)   methylPREDNISolone sodium succinate (Solu-MEDROL) injection 125 mg (125 mg Intravenous Given 10/26/23 1812)   diazepam (VALIUM) injection 2.5 mg (2.5 mg Intravenous Given 10/26/23 1822)   albuterol inhalation solution 10 mg (10 mg Nebulization Given 10/26/23 2036)   ipratropium (ATROVENT) 0.02 % inhalation solution 1 mg (1 mg Nebulization Given 10/26/23 2036)   sodium chloride 0.9 % inhalation solution 12 mL (12 mL Nebulization Given 10/26/23 2036)       Diagnostic Studies  Results Reviewed       Procedure Component Value Units Date/Time    HS Troponin I 2hr [[id number]]  (Normal) Collected: 10/26/23 2006    Lab Status: Final result Specimen: Blood from Arm, Right Updated: 10/26/23 2041     hs TnI 2hr 5 ng/L      Delta 2hr hsTnI 0 ng/L     HS Troponin 0hr (reflex protocol) [[id number]]  (Normal) Collected: 10/26/23 1802    Lab Status: Final result Specimen: Blood from Arm, Right Updated: 10/26/23 1834     hs TnI 0hr 5 ng/L     Comprehensive metabolic panel [[id number]] Collected: 10/26/23 1802    Lab Status: Final result Specimen: Blood from Arm, Right Updated: 10/26/23 1831     Sodium 142 mmol/L      Potassium 4.3 mmol/L      Chloride 106 mmol/L      CO2 30 mmol/L      ANION GAP 6 mmol/L      BUN 15 mg/dL      Creatinine 0.61 mg/dL      Glucose 121 mg/dL      Calcium 9.9 mg/dL      AST 31 U/L      ALT 29 U/L      Alkaline Phosphatase 64 U/L      Total Protein 7.3 g/dL      Albumin 4.2 g/dL      Total Bilirubin 0.30 mg/dL      eGFR 92 ml/min/1.73sq m     Narrative:      [facility] (CKD):     Stage 1 with normal or high GFR (GFR > 90 mL/min/1.73 square meters)    Stage 2 Mild CKD (GFR = 60-89 mL/min/1.73 square meters)    Stage 3A Moderate CKD (GFR = 45-59 mL/min/1.73 square meters)    Stage 3B Moderate CKD (GFR = 30-44 mL/min/1.73 square meters)    Stage 4 Severe CKD (GFR = 15-29 mL/min/1.73 square meters)    Stage 5 End Stage CKD (GFR <15 mL/min/1.73 square meters)  Note: GFR calculation is accurate only with a steady state creatinine    CBC and differential [[id number]]  (Abnormal) Collected: 10/26/23 1802    Lab Status: Final result Specimen: Blood from Arm, Right Updated: 10/26/23 1810     WBC 10.71 Thousand/uL      RBC 5.04 Million/uL      Hemoglobin 14.9 g/dL      Hematocrit 48.5 %      MCV 96 fL      MCH 29.6 pg      MCHC 30.7 g/dL      RDW 13.0 %      MPV 10.6 fL      Platelets 717 Thousands/uL      nRBC 0 /100 WBCs      Neutrophils Relative 70 %      Immat GRANS % 0 %      Lymphocytes Relative 14 %      Monocytes Relative 7 %      Eosinophils Relative 9 %      Basophils Relative 0 %      Neutrophils Absolute 7.47 Thousands/µL      Immature Grans Absolute 0.03 Thousand/uL      Lymphocytes Absolute 1.47 Thousands/µL      Monocytes Absolute 0.74 Thousand/µL      Eosinophils Absolute 0.97 Thousand/µL      Basophils Absolute 0.03 Thousands/µL                    XR chest 2 views   ED Interpretation by Jesus Fernandes MD (10/26 1949)   Chest radiograph personally interpreted by me as no acute cardiopulmonary disease. Procedures  Procedures      ED Course  ED Course as of 10/26/23 2217   Thu Oct 26, 2023   1841 This ECG was interpreted by me.  The ECG demonstrates Normal sinus rhythm, normal intervals, normal axis, normal QRS, no acute ST changes present. SBIRT 22yo+      Flowsheet Row Most Recent Value   Initial Alcohol Screen: US AUDIT-C     1. How often do you have a drink containing alcohol? 0 Filed at: 10/26/2023 1757   2. How many drinks containing alcohol do you have on a typical day you are drinking? 0 Filed at: 10/26/2023 1757   3b. FEMALE Any Age, or MALE 65+: How often do you have 4 or more drinks on one occassion? 0 Filed at: 10/26/2023 1757   Audit-C Score 0 Filed at: 10/26/2023 1757   [place]: How many times in the past year have you. .. Used an illegal drug or used a prescription medication for non-medical reasons? Never Filed at: 10/26/2023 1757                  Medical Decision Making  Presents with dyspnea and wheezing for weeks but worsening. Patient with diffuse wheezing bilaterally with tachypnea on arrival. She was immediately given a mckeon neb and methylprednisone. DDX: COPD, asthma, pneumonia, ACS    She responded well after two mckeon nebs. Ambulatory pulse O2 93% and patient felt much better. Patient appears to have some baseline asthma or COPD likely worsened by a viral syndrome. Will discharge with 4 more days of prednisone and recommend follow up with pulmonology. She has an albuterol inhaler at home and can continue using every 4-6 hours. Patient in agreement with plan and questions were answered. Verbalized understanding of return precautions. Portions or all of this note were generated using voice recognition software. Occasional wrong word or "sound a like" substitutions may have occurred due to the inherent limitations of voice recognition software. Please interpret any errors within the intended context of the whole sentence or idea. Amount and/or Complexity of Data Reviewed  Radiology: ordered and independent interpretation performed. Risk  Prescription drug management.           Disposition  Final diagnoses:   Dyspnea   Wheezing Time reflects when diagnosis was documented in both MDM as applicable and the Disposition within this note       Time User Action Codes Description Comment    10/26/2023  6:35 PM Addie Mask Add [R06.00] Dyspnea     10/26/2023  6:35 PM Addie Mask Add [R06.2] Wheezing           ED Disposition       ED Disposition   Discharge    Condition   Stable    Date/Time   Thu Oct 26, 2023 [facility] discharge to home/self care. Follow-up Information       Follow up With Specialties Details Why Contact Info Additional Information    [facility] Schedule an appointment as soon as possible for a visit in 1 week  [address] [facility] [address]  [facility] [address] [facility], [address], [address], [place], [place], [address], [phone]            Patient's Medications   Discharge Prescriptions    No medications on file         PDMP Review       None             ED Provider  Attending physically available and evaluated [facility]. I managed the patient along with the ED Attending.     Electronically Signed by           Vivian Anne MD  10/26/23 4068

## 2023-10-30 ENCOUNTER — CONSULT (OUTPATIENT)
Dept: PULMONOLOGY | Facility: CLINIC | Age: 73
End: 2023-10-30
Payer: MEDICARE

## 2023-10-30 VITALS
RESPIRATION RATE: 18 BRPM | BODY MASS INDEX: 26.9 KG/M2 | HEIGHT: 60 IN | WEIGHT: 137 LBS | OXYGEN SATURATION: 97 % | DIASTOLIC BLOOD PRESSURE: 88 MMHG | HEART RATE: 92 BPM | SYSTOLIC BLOOD PRESSURE: 128 MMHG

## 2023-10-30 DIAGNOSIS — J45.40 MODERATE PERSISTENT ASTHMA WITHOUT COMPLICATION: Primary | ICD-10-CM

## 2023-10-30 DIAGNOSIS — R06.2 WHEEZING: ICD-10-CM

## 2023-10-30 LAB
DME PARACHUTE DELIVERY DATE REQUESTED: NORMAL
DME PARACHUTE ITEM DESCRIPTION: NORMAL
DME PARACHUTE ORDER STATUS: NORMAL
DME PARACHUTE SUPPLIER NAME: NORMAL
DME PARACHUTE SUPPLIER PHONE: NORMAL

## 2023-10-30 PROCEDURE — 99204 OFFICE O/P NEW MOD 45 MIN: CPT | Performed by: INTERNAL MEDICINE

## 2023-10-30 RX ORDER — BUDESONIDE AND FORMOTEROL FUMARATE DIHYDRATE 160; 4.5 UG/1; UG/1
2 AEROSOL RESPIRATORY (INHALATION) 2 TIMES DAILY
Qty: 10.2 G | Refills: 5 | Status: SHIPPED | OUTPATIENT
Start: 2023-10-30

## 2023-10-30 RX ORDER — ALBUTEROL SULFATE 2.5 MG/3ML
2.5 SOLUTION RESPIRATORY (INHALATION) 2 TIMES DAILY
Qty: 180 ML | Refills: 5 | Status: SHIPPED | OUTPATIENT
Start: 2023-10-30 | End: 2024-04-27

## 2023-10-30 NOTE — PROGRESS NOTES
Pulmonary Consultation   Ara Amador 81 y.o. female MRN: [id number]  10/30/2023      Assessment:  Moderate persistent asthma  Likely the reason she was seen in the ED  By clinical diagnosis: Episodes of cough/wheezing/chest tightness with exposure to a recent black mold, noted peripheral eosinophilia at 970 cells in the ED  Possible underlying chronic bronchitis/from prior smoking history  Completed steroid taper today, only on PRN albuterol    Plan:  Start maintenance inhaler with Symbicort 160  Educated about the proper inhaler use technique/rinse mouth following each use  Continue albuterol HFA  Nebulizer/supplies with albuterol nebs  Counseled about avoiding allergens/triggers  Check PFT/BD response      Return in about 2 months (around 12/30/2023). History of Present Illness     New Consult for: Wheezing      Background  81 y.o. female with a h/o anxiety/depression, hypothyroidism, dyslipidemia, osteoporosis, migraine, former tobacco abuse    Seen in the ED 10/26 for shortness of breath and wheezing. Before that given Z-Mingo at the urgent care which did not help. Treated with IV steroids/nebulized inhalers. Noted elevated eosinophilic count at 820 cells. Chest x-ray showed no acute changes. Reports feeling cough, dyspnea, chest tightness and severe wheezing few weeks ago. Possible trigger is exposure to black mold while cleaning at the shower at home. Reports history of seasonal allergies for several years, usually around spring, fall and winter. No prior formal diagnosis of asthma or COPD. Smoked 15-pack-year quit in the 1980s, return to smoking few cigarettes per day for 30 years quit completely in 5/2023. Since the ED visit, felt better on the steroid taper, also PRN albuterol with a spacer which he uses at least 2-4 times per day. Also reports improvement of the nocturnal coughing spells/episodes.     Review of Systems  As per hpi, all other systems reviewed and were negative. Social/exposure history  About 15-pack-year tobacco abuse history quit in the 1980s  Smoked briefly few cigarettes per day for 3 years quit in 1/3891  Nonalcoholic  She is a retired teacher  Lives in an old house, has possible exposure to mold/black in the bathroom  Also there is mold in the basement however does not go there  Pets: 1 dog for 8 years    Studies:  Imaging and other studies: I have personally reviewed pertinent films in PACS      Pulmonary function testing:   None on file    EKG, Pathology, and Other Studies: I have personally reviewed pertinent reports. Historical Information   Past Medical History:   Diagnosis Date    Allergic     Seasonal    Arthritis ? Depression     Disease of thyroid gland     Diverticulitis of colon About 10 yrs ago    Severe    Headache(784.0) When I was 17 yrs old    Migraines    Hypercholesteremia     Migraine     Osteoporosis      Past Surgical History:   Procedure Laterality Date    COLON SURGERY  2021    Prolapse rectim    FINGER FRACTURE SURGERY      FRACTURE SURGERY  2015    Lower left plate,9 screws,2 pins    HYSTERECTOMY      LEG SURGERY      RECTAL PROLAPSE REPAIR      TOTAL ABDOMINAL HYSTERECTOMY W/ BILATERAL SALPINGOOPHORECTOMY       Family History   Problem Relation Age of Onset    Vision loss Mother     Glaucoma Mother     Cancer Father     Asthma Father         Legionaries disease    COPD Father         Legionnaires disease    Hearing loss Father     Ovarian cancer Maternal Grandmother     No Known Problems Paternal Grandmother     No Known Problems Paternal Aunt     No Known Problems Paternal Aunt          Medications/Allergies: Reviewed    Vitals: Blood pressure 128/88, pulse 92, resp. rate 18, height 5' (1.524 m), weight 62.1 kg (137 lb), SpO2 97 %, not currently breastfeeding. Body mass index is 26.76 kg/m². Oxygen Therapy  SpO2: 97 %  Oxygen Therapy: None (Room air)      Physical Exam  Body mass index is 26.76 kg/m².    Gen: not in acute distress,   Neck/Eyes: supple, no adenopathy, PERRL  Ear: normal appearance, no significant hearing impairment  Nose:  normal nasal mucosa, no drainage  Chest: normal respiratory efforts, clear breath sounds bilaterally  CV: RRR, no murmurs appreciated, no edema  Abdomen: soft, non tender  Extremities:  No observed deformity   Skin: unremarkable  Neuro: AAO X3, no focal motor deficit         Labs:  Lab Results   Component Value Date    WBC 10.71 (H) 10/26/2023    HGB 14.9 10/26/2023    HCT 48.5 (H) 10/26/2023    MCV 96 10/26/2023     10/26/2023     Lab Results   Component Value Date    CALCIUM 9.9 10/26/2023    K 4.3 10/26/2023    CO2 30 10/26/2023     10/26/2023    BUN 15 10/26/2023    CREATININE 0.61 10/26/2023     No results found for: "IGE"  Lab Results   Component Value Date    ALT 29 10/26/2023    AST 31 10/26/2023    ALKPHOS 64 10/26/2023           Portions of the record may have been created with voice recognition software. Occasional wrong word or "sound a like" substitutions may have occurred due to the inherent limitations of voice recognition software. Read the chart carefully and recognize, using context, where substitutions have occurred    Gino Duong M.D.   [facility]

## 2023-11-04 LAB
DME PARACHUTE DELIVERY DATE ACTUAL: NORMAL
DME PARACHUTE DELIVERY DATE REQUESTED: NORMAL
DME PARACHUTE ITEM DESCRIPTION: NORMAL
DME PARACHUTE ORDER STATUS: NORMAL
DME PARACHUTE SUPPLIER NAME: NORMAL
DME PARACHUTE SUPPLIER PHONE: NORMAL

## 2023-11-06 ENCOUNTER — HOSPITAL ENCOUNTER (OUTPATIENT)
Dept: PULMONOLOGY | Facility: HOSPITAL | Age: 73
Discharge: HOME | End: 2023-11-06
Attending: INTERNAL MEDICINE
Payer: MEDICARE

## 2023-11-06 DIAGNOSIS — J45.40 MODERATE PERSISTENT ASTHMA WITHOUT COMPLICATION: ICD-10-CM

## 2023-11-06 PROCEDURE — 94060 EVALUATION OF WHEEZING: CPT

## 2023-11-06 PROCEDURE — 94729 DIFFUSING CAPACITY: CPT | Performed by: INTERNAL MEDICINE

## 2023-11-06 PROCEDURE — 94726 PLETHYSMOGRAPHY LUNG VOLUMES: CPT | Performed by: INTERNAL MEDICINE

## 2023-11-06 PROCEDURE — 94060 EVALUATION OF WHEEZING: CPT | Performed by: INTERNAL MEDICINE

## 2023-11-06 PROCEDURE — 94729 DIFFUSING CAPACITY: CPT

## 2023-11-06 PROCEDURE — 94760 N-INVAS EAR/PLS OXIMETRY 1: CPT

## 2023-11-06 PROCEDURE — 94726 PLETHYSMOGRAPHY LUNG VOLUMES: CPT

## 2023-11-06 RX ORDER — ALBUTEROL SULFATE 2.5 MG/3ML
2.5 SOLUTION RESPIRATORY (INHALATION) ONCE AS NEEDED
Status: COMPLETED | OUTPATIENT
Start: 2023-11-06 | End: 2023-11-06

## 2023-11-06 RX ADMIN — ALBUTEROL SULFATE 2.5 MG: 2.5 SOLUTION RESPIRATORY (INHALATION) at 14:48

## 2023-12-11 ENCOUNTER — HOSPITAL ENCOUNTER (OUTPATIENT)
Dept: MAMMOGRAPHY | Facility: HOSPITAL | Age: 73
Discharge: HOME | End: 2023-12-11
Payer: MEDICARE

## 2023-12-11 VITALS — HEIGHT: 60 IN | BODY MASS INDEX: 26.9 KG/M2 | WEIGHT: 137 LBS

## 2023-12-11 DIAGNOSIS — Z12.31 ENCOUNTER FOR SCREENING MAMMOGRAM FOR BREAST CANCER: ICD-10-CM

## 2023-12-11 PROCEDURE — 77063 BREAST TOMOSYNTHESIS BI: CPT

## 2023-12-11 PROCEDURE — 77067 SCR MAMMO BI INCL CAD: CPT

## 2023-12-27 ENCOUNTER — OFFICE VISIT (OUTPATIENT)
Dept: PULMONOLOGY | Facility: CLINIC | Age: 73
End: 2023-12-27
Payer: MEDICARE

## 2023-12-27 VITALS
DIASTOLIC BLOOD PRESSURE: 100 MMHG | HEART RATE: 72 BPM | HEIGHT: 60 IN | WEIGHT: 148.2 LBS | SYSTOLIC BLOOD PRESSURE: 140 MMHG | OXYGEN SATURATION: 97 % | BODY MASS INDEX: 29.09 KG/M2 | TEMPERATURE: 97.2 F

## 2023-12-27 DIAGNOSIS — J45.40 MODERATE PERSISTENT ASTHMA WITHOUT COMPLICATION: ICD-10-CM

## 2023-12-27 PROCEDURE — 99213 OFFICE O/P EST LOW 20 MIN: CPT | Performed by: INTERNAL MEDICINE

## 2023-12-27 RX ORDER — ALBUTEROL SULFATE 2.5 MG/3ML
2.5 SOLUTION RESPIRATORY (INHALATION) EVERY 4 HOURS PRN
COMMUNITY
Start: 2023-12-27

## 2024-01-25 ENCOUNTER — VBI (OUTPATIENT)
Dept: ADMINISTRATIVE | Facility: OTHER | Age: 74
End: 2024-01-25

## 2024-01-26 ENCOUNTER — TELEPHONE (OUTPATIENT)
Age: 74
End: 2024-01-26

## 2024-01-29 ENCOUNTER — OFFICE VISIT (OUTPATIENT)
Dept: INTERNAL MEDICINE CLINIC | Facility: CLINIC | Age: 74
End: 2024-01-29
Payer: MEDICARE

## 2024-01-29 VITALS
TEMPERATURE: 97.9 F | HEART RATE: 94 BPM | BODY MASS INDEX: 28.23 KG/M2 | HEIGHT: 60 IN | WEIGHT: 143.8 LBS | DIASTOLIC BLOOD PRESSURE: 104 MMHG | OXYGEN SATURATION: 96 % | SYSTOLIC BLOOD PRESSURE: 160 MMHG

## 2024-01-29 DIAGNOSIS — Z12.11 COLON CANCER SCREENING: ICD-10-CM

## 2024-01-29 DIAGNOSIS — L20.9 ATOPIC DERMATITIS, UNSPECIFIED TYPE: Primary | ICD-10-CM

## 2024-01-29 DIAGNOSIS — G43.809 OTHER MIGRAINE WITHOUT STATUS MIGRAINOSUS, NOT INTRACTABLE: ICD-10-CM

## 2024-01-29 DIAGNOSIS — Z13.220 SCREENING, LIPID: ICD-10-CM

## 2024-01-29 DIAGNOSIS — Z23 ENCOUNTER FOR IMMUNIZATION: ICD-10-CM

## 2024-01-29 DIAGNOSIS — E78.00 HYPERCHOLESTEROLEMIA: ICD-10-CM

## 2024-01-29 DIAGNOSIS — Z13.0 SCREENING FOR DEFICIENCY ANEMIA: ICD-10-CM

## 2024-01-29 DIAGNOSIS — E55.9 VITAMIN D DEFICIENCY: ICD-10-CM

## 2024-01-29 DIAGNOSIS — Z13.1 SCREENING FOR DIABETES MELLITUS: ICD-10-CM

## 2024-01-29 DIAGNOSIS — I10 PRIMARY HYPERTENSION: ICD-10-CM

## 2024-01-29 DIAGNOSIS — Z13.29 SCREENING FOR THYROID DISORDER: ICD-10-CM

## 2024-01-29 DIAGNOSIS — E66.3 OVERWEIGHT (BMI 25.0-29.9): ICD-10-CM

## 2024-01-29 PROCEDURE — 99214 OFFICE O/P EST MOD 30 MIN: CPT | Performed by: PHYSICIAN ASSISTANT

## 2024-01-29 RX ORDER — SUMATRIPTAN 6 MG/.5ML
6 INJECTION, SOLUTION SUBCUTANEOUS ONCE
Qty: 0.5 ML | Refills: 1 | Status: SHIPPED | OUTPATIENT
Start: 2024-01-29 | End: 2024-01-29

## 2024-01-29 RX ORDER — TRIAMCINOLONE ACETONIDE 1 MG/G
CREAM TOPICAL 2 TIMES DAILY
Qty: 15 G | Refills: 2 | Status: SHIPPED | OUTPATIENT
Start: 2024-01-29

## 2024-01-29 RX ORDER — BUPROPION HYDROCHLORIDE 300 MG/1
300 TABLET ORAL EVERY MORNING
Qty: 30 TABLET | Refills: 5 | Status: SHIPPED | OUTPATIENT
Start: 2024-01-29 | End: 2024-07-27

## 2024-01-29 RX ORDER — BUTALBITAL, ACETAMINOPHEN AND CAFFEINE 50; 325; 40 MG/1; MG/1; MG/1
1 TABLET ORAL EVERY 6 HOURS PRN
Qty: 60 TABLET | Refills: 1 | Status: SHIPPED | OUTPATIENT
Start: 2024-01-29

## 2024-01-29 NOTE — ASSESSMENT & PLAN NOTE
Continue wellbutrin but increase to 300mg. Update labs. Directions for use and possible side effects discussed and patient verbalized understanding of these.

## 2024-01-29 NOTE — PROGRESS NOTES
Name: Shell Mccann      : 1950      MRN: [id number]  Encounter Provider: Ellen Mei PA-C  Encounter Date: 2024   Encounter department: [facility]    Assessment & Plan     1. Atopic dermatitis, unspecified type  Assessment & Plan:  Start triamcinolone. Directions for use and possible side effects discussed and patient verbalized understanding of these.      Orders:  -     triamcinolone (KENALOG) 0.1 % cream; Apply topically 2 (two) times a day    2. Encounter for immunization  -     influenza vaccine, high-dose, PF 0.7 mL (FLUZONE HIGH-DOSE)    3. Hypercholesterolemia    4. Screening for diabetes mellitus  -     Comprehensive metabolic panel; Future    5. Vitamin D deficiency  -     Vitamin D 25 hydroxy; Future    6. Screening for thyroid disorder  -     TSH, 3rd generation; Future    7. Screening, lipid  -     Lipid panel; Future    8. Screening for deficiency anemia  -     CBC and differential; Future    9. Colon cancer screening  -     Ambulatory Referral to Gastroenterology; Future    10. Overweight (BMI 25.0-29.9)  Assessment & Plan:  Continue wellbutrin but increase to 300mg. Update labs. Directions for use and possible side effects discussed and patient verbalized understanding of these.      Orders:  -     buPROPion (WELLBUTRIN XL) 300 mg 24 hr tablet; Take 1 tablet (300 mg total) by mouth every morning    11. Other migraine without status migrainosus, not intractable  -     butalbital-acetaminophen-caffeine (FIORICET,ESGIC) -40 mg per tablet; Take 1 tablet by mouth every 6 (six) hours as needed for headaches  -     SUMAtriptan (IMITREX) 6 mg/0.5 mL; Inject 0.5 mL (6 mg total) under the skin once for 1 dose    12. Primary hypertension  Assessment & Plan:  Not at goal but pt relates this is white coat and denies any symptoms. Will monitor.           Depression Screening and Follow-up Plan: Patient was screened for depression during today's encounter. They  screened negative with a PHQ-2 score of 0.        Subjective      Pt presents for routine visit. Her BP remains elevated but she states this is white coat. She denies any symptoms. She is up to date with mammogram and dexa scan. She will be due for colon screening in May. She is due for labs. She desires flu vaccine. RSV vaccine recommended. She remains frustrated with her weight and would like phentermine. I informed pt we cannot use due to her blood pressure. Will increase wellbutrin. She has a dry rash on her fingers that cracks and causes some discomfort when she does things with her hands. She was also recently diagnosed with asthma. She is on symbicort with good results.       Review of Systems   Constitutional:  Negative for chills and fever.   HENT:  Negative for congestion, ear pain, hearing loss, postnasal drip, rhinorrhea, sinus pressure, sinus pain, sore throat and trouble swallowing.    Eyes:  Negative for pain and visual disturbance.   Respiratory:  Negative for cough, chest tightness, shortness of breath and wheezing.    Cardiovascular: Negative.  Negative for chest pain, palpitations and leg swelling.   Gastrointestinal:  Negative for abdominal pain, blood in stool, constipation, diarrhea, nausea and vomiting.   Endocrine: Negative for cold intolerance, heat intolerance, polydipsia, polyphagia and polyuria.   Genitourinary:  Negative for difficulty urinating, dysuria, flank pain and urgency.   Musculoskeletal:  Negative for arthralgias, back pain, gait problem and myalgias.   Skin:  Positive for rash.   Allergic/Immunologic: Negative.    Neurological:  Negative for dizziness, weakness, light-headedness and headaches.   Hematological: Negative.    Psychiatric/Behavioral:  Negative for behavioral problems, dysphoric mood and sleep disturbance. The patient is not nervous/anxious.        Current Outpatient Medications on File Prior to Visit   Medication Sig    albuterol (2.5 mg/3 mL) 0.083 % nebulizer  solution Take 3 mL (2.5 mg total) by nebulization every 4 (four) hours as needed for wheezing or shortness of breath    albuterol (Ventolin HFA) 90 mcg/act inhaler Inhale 2 puffs every 4 (four) hours as needed for wheezing    budesonide-formoterol (Symbicort) 160-4.5 mcg/act inhaler Inhale 2 puffs 2 (two) times a day Rinse mouth after use.    Cholecalciferol (VITAMIN D PO) Take 5,000 Units by mouth in the morning    Coenzyme Q10 (COQ10) 100 MG CAPS Take by mouth in the morning    ezetimibe-simvastatin (VYTORIN) 10-40 mg per tablet TAKE 1 TABLET BY MOUTH DAILY AT BEDTIME    fenofibrate micronized (ANTARA) 130 MG capsule TAKE 1 CAPSULE (130 MG TOTAL) BY MOUTH DAILY WITH BREAKFAST    FLUoxetine (PROzac) 20 mg capsule Take 1 capsule (20 mg total) by mouth daily    levothyroxine 50 mcg tablet TAKE 1 TABLET (50 MCG TOTAL) BY MOUTH DAILY    Magnesium 100 MG TABS Take by mouth in the morning    metoprolol succinate (Toprol XL) 100 mg 24 hr tablet Take 1 tablet (100 mg total) by mouth daily    Multiple Vitamin (MULTI-VITAMIN DAILY PO) once daily    Omega-3 1000 MG CAPS Take by mouth in the morning    Potassium Gluconate 80 MG TABS Take by mouth in the morning    Progesterone 100 MG CAPS Take 200 capsules by mouth in the morning Mon-sat    [DISCONTINUED] buPROPion (WELLBUTRIN XL) 150 mg 24 hr tablet Take 1 tablet (150 mg total) by mouth every morning    [DISCONTINUED] butalbital-acetaminophen-caffeine (FIORICET,ESGIC) -40 mg per tablet Take 1 tablet by mouth every 6 (six) hours as needed for headaches    naproxen (NAPROSYN) 500 mg tablet if needed       Objective     BP (!) 160/104 (BP Location: Right arm, Patient Position: Sitting, Cuff Size: Standard)   Pulse 94   Temp 97.9 °F (36.6 °C) (Tympanic)   Ht 5' (1.524 m)   Wt 65.2 kg (143 lb 12.8 oz)   SpO2 96%   BMI 28.08 kg/m²     Physical Exam  Vitals and nursing note reviewed.   Constitutional:       General: She is not in acute distress.     Appearance: Normal  appearance. She is well-developed. She is not diaphoretic.   HENT:      Head: Normocephalic and atraumatic.      Right Ear: External ear normal.      Left Ear: External ear normal.      Nose: Nose normal.      Mouth/Throat:      Pharynx: No oropharyngeal exudate.   Eyes:      General: No scleral icterus.        Right eye: No discharge.         Left eye: No discharge.      Conjunctiva/sclera: Conjunctivae normal.      Pupils: Pupils are equal, round, and reactive to light.   Neck:      Thyroid: No thyromegaly.   Cardiovascular:      Rate and Rhythm: Normal rate and regular rhythm.      Heart sounds: Normal heart sounds. No murmur heard.     No friction rub. No gallop.   Pulmonary:      Effort: Pulmonary effort is normal. No respiratory distress.      Breath sounds: Normal breath sounds. No wheezing or rales.   Abdominal:      General: Bowel sounds are normal. There is no distension.      Palpations: Abdomen is soft.      Tenderness: There is no abdominal tenderness.   Musculoskeletal:         General: No tenderness or deformity. Normal range of motion.      Cervical back: Normal range of motion and neck supple.   Skin:     General: Skin is warm and dry.      Findings: Rash present.   Neurological:      Mental Status: She is alert and oriented to person, place, and time.      Cranial Nerves: No cranial nerve deficit.   Psychiatric:         Behavior: Behavior normal.         Thought Content: Thought content normal.         Judgment: Judgment normal.       Ellen Mei PA-C

## 2024-01-29 NOTE — ASSESSMENT & PLAN NOTE
Start triamcinolone. Directions for use and possible side effects discussed and patient verbalized understanding of these.

## 2024-02-09 ENCOUNTER — VBI (OUTPATIENT)
Dept: ADMINISTRATIVE | Facility: OTHER | Age: 74
End: 2024-02-09

## 2024-02-21 ENCOUNTER — VBI (OUTPATIENT)
Dept: ADMINISTRATIVE | Facility: OTHER | Age: 74
End: 2024-02-21

## 2024-03-06 ENCOUNTER — TELEPHONE (OUTPATIENT)
Age: 74
End: 2024-03-06

## 2024-03-06 NOTE — TELEPHONE ENCOUNTER
Patient scheduled for OV and would like to have her records sent over prior too.  Patient currently in [place] and doesn't have the name or number to doctors office.  She will call back once they return to [place] for our fax number etc.to have records sent

## 2024-03-06 NOTE — TELEPHONE ENCOUNTER
03/06/24  Screened by: Tracey Jang    Referring Provider Dr. Mei    Pre- Screening:     There is no height or weight on file to calculate BMI.  Has patient been referred for a routine screening Colonoscopy? yes  Is the patient between 45-75 years old? yes      Previous Colonoscopy yes   If yes:    Date: 5/20/2021    Facility: [facility]    Reason: SCREENING        Does the patient want to see a Gastroenterologist prior to their procedure OR are they having any GI symptoms? yes    Has the patient been hospitalized or had abdominal surgery in the past 6 months? no    Does the patient use supplemental oxygen? no    Does the patient take Coumadin, Lovenox, Plavix, Elliquis, Xarelto, or other blood thinning medication? no    Has the patient had a stroke, cardiac event, or stent placed in the past year? no      If patient is between 45yrs - 49yrs, please advise patient that we will have to confirm benefits & coverage with their insurance company for a routine screening colonoscopy.

## 2024-04-24 ENCOUNTER — TELEPHONE (OUTPATIENT)
Dept: PULMONOLOGY | Facility: CLINIC | Age: 74
End: 2024-04-24

## 2024-04-30 ENCOUNTER — APPOINTMENT (OUTPATIENT)
Dept: LAB | Facility: CLINIC | Age: 74
End: 2024-04-30
Payer: MEDICARE

## 2024-04-30 DIAGNOSIS — N95.1 SYMPTOMATIC MENOPAUSAL OR FEMALE CLIMACTERIC STATES: ICD-10-CM

## 2024-04-30 DIAGNOSIS — Z13.220 SCREENING, LIPID: ICD-10-CM

## 2024-04-30 DIAGNOSIS — E78.00 PURE HYPERCHOLESTEROLEMIA: ICD-10-CM

## 2024-04-30 DIAGNOSIS — G43.001 MIGRAINE WITHOUT AURA AND WITH STATUS MIGRAINOSUS, NOT INTRACTABLE: ICD-10-CM

## 2024-04-30 DIAGNOSIS — E55.9 VITAMIN D DEFICIENCY: ICD-10-CM

## 2024-04-30 DIAGNOSIS — Z13.29 SCREENING FOR THYROID DISORDER: ICD-10-CM

## 2024-04-30 DIAGNOSIS — E03.9 HYPOTHYROIDISM, UNSPECIFIED TYPE: ICD-10-CM

## 2024-04-30 DIAGNOSIS — M81.0 OSTEOPOROSIS, UNSPECIFIED OSTEOPOROSIS TYPE, UNSPECIFIED PATHOLOGICAL FRACTURE PRESENCE: ICD-10-CM

## 2024-04-30 DIAGNOSIS — Z79.890 POSTMENOPAUSAL HORMONE THERAPY: ICD-10-CM

## 2024-04-30 DIAGNOSIS — Z13.1 SCREENING FOR DIABETES MELLITUS: ICD-10-CM

## 2024-04-30 DIAGNOSIS — M81.0 OSTEOPOROSIS, POST-MENOPAUSAL: ICD-10-CM

## 2024-04-30 DIAGNOSIS — Z13.0 SCREENING FOR DEFICIENCY ANEMIA: ICD-10-CM

## 2024-04-30 LAB
25(OH)D3 SERPL-MCNC: 43.6 NG/ML (ref 30–100)
BASOPHILS # BLD AUTO: 0.05 THOUSANDS/ÂΜL (ref 0–0.1)
BASOPHILS NFR BLD AUTO: 1 % (ref 0–1)
CA-I BLD-SCNC: 1.28 MMOL/L (ref 1.12–1.32)
CHOLEST SERPL-MCNC: 174 MG/DL
CORTIS SERPL-MCNC: 11.9 UG/DL
EOSINOPHIL # BLD AUTO: 0.32 THOUSAND/ÂΜL (ref 0–0.61)
EOSINOPHIL NFR BLD AUTO: 4 % (ref 0–6)
ERYTHROCYTE [DISTWIDTH] IN BLOOD BY AUTOMATED COUNT: 13.7 % (ref 11.6–15.1)
EST. AVERAGE GLUCOSE BLD GHB EST-MCNC: 117 MG/DL
ESTRADIOL SERPL-MCNC: <15 PG/ML
HBA1C MFR BLD: 5.7 %
HCT VFR BLD AUTO: 47.6 % (ref 34.8–46.1)
HDLC SERPL-MCNC: 71 MG/DL
HGB BLD-MCNC: 14.7 G/DL (ref 11.5–15.4)
IMM GRANULOCYTES # BLD AUTO: 0.01 THOUSAND/UL (ref 0–0.2)
IMM GRANULOCYTES NFR BLD AUTO: 0 % (ref 0–2)
LDLC SERPL CALC-MCNC: 77 MG/DL (ref 0–100)
LYMPHOCYTES # BLD AUTO: 1.81 THOUSANDS/ÂΜL (ref 0.6–4.47)
LYMPHOCYTES NFR BLD AUTO: 23 % (ref 14–44)
MCH RBC QN AUTO: 29.5 PG (ref 26.8–34.3)
MCHC RBC AUTO-ENTMCNC: 30.9 G/DL (ref 31.4–37.4)
MCV RBC AUTO: 95 FL (ref 82–98)
MONOCYTES # BLD AUTO: 0.44 THOUSAND/ÂΜL (ref 0.17–1.22)
MONOCYTES NFR BLD AUTO: 6 % (ref 4–12)
NEUTROPHILS # BLD AUTO: 5.15 THOUSANDS/ÂΜL (ref 1.85–7.62)
NEUTS SEG NFR BLD AUTO: 66 % (ref 43–75)
NONHDLC SERPL-MCNC: 103 MG/DL
NRBC BLD AUTO-RTO: 0 /100 WBCS
PHOSPHATE SERPL-MCNC: 3.7 MG/DL (ref 2.3–4.1)
PLATELET # BLD AUTO: 205 THOUSANDS/UL (ref 149–390)
PMV BLD AUTO: 11.9 FL (ref 8.9–12.7)
PROGEST SERPL-MCNC: 0.41 NG/ML
PTH-INTACT SERPL-MCNC: 57.9 PG/ML (ref 12–88)
RBC # BLD AUTO: 4.99 MILLION/UL (ref 3.81–5.12)
TRIGL SERPL-MCNC: 132 MG/DL
TSH SERPL DL<=0.05 MIU/L-ACNC: 2.03 UIU/ML (ref 0.45–4.5)
WBC # BLD AUTO: 7.78 THOUSAND/UL (ref 4.31–10.16)

## 2024-04-30 PROCEDURE — 82306 VITAMIN D 25 HYDROXY: CPT

## 2024-04-30 PROCEDURE — 84443 ASSAY THYROID STIM HORMONE: CPT

## 2024-04-30 PROCEDURE — 82627 DEHYDROEPIANDROSTERONE: CPT

## 2024-04-30 PROCEDURE — 80061 LIPID PANEL: CPT

## 2024-04-30 PROCEDURE — 82330 ASSAY OF CALCIUM: CPT

## 2024-04-30 PROCEDURE — 82523 COLLAGEN CROSSLINKS: CPT

## 2024-04-30 PROCEDURE — 83970 ASSAY OF PARATHORMONE: CPT

## 2024-04-30 PROCEDURE — 82679 ASSAY OF ESTRONE: CPT

## 2024-04-30 PROCEDURE — 84144 ASSAY OF PROGESTERONE: CPT

## 2024-04-30 PROCEDURE — 36415 COLL VENOUS BLD VENIPUNCTURE: CPT

## 2024-04-30 PROCEDURE — 82533 TOTAL CORTISOL: CPT

## 2024-04-30 PROCEDURE — 84100 ASSAY OF PHOSPHORUS: CPT

## 2024-04-30 PROCEDURE — 82670 ASSAY OF TOTAL ESTRADIOL: CPT

## 2024-04-30 PROCEDURE — 83036 HEMOGLOBIN GLYCOSYLATED A1C: CPT

## 2024-04-30 PROCEDURE — 85025 COMPLETE CBC W/AUTO DIFF WBC: CPT

## 2024-04-30 PROCEDURE — 84403 ASSAY OF TOTAL TESTOSTERONE: CPT

## 2024-05-01 ENCOUNTER — APPOINTMENT (OUTPATIENT)
Dept: LAB | Facility: CLINIC | Age: 74
End: 2024-05-01
Payer: MEDICARE

## 2024-05-01 DIAGNOSIS — N95.1 SYMPTOMATIC MENOPAUSAL OR FEMALE CLIMACTERIC STATES: ICD-10-CM

## 2024-05-01 DIAGNOSIS — M81.0 OSTEOPOROSIS, POST-MENOPAUSAL: ICD-10-CM

## 2024-05-01 DIAGNOSIS — E78.00 PURE HYPERCHOLESTEROLEMIA: ICD-10-CM

## 2024-05-01 DIAGNOSIS — Z79.890 POSTMENOPAUSAL HORMONE THERAPY: ICD-10-CM

## 2024-05-01 DIAGNOSIS — G43.001 MIGRAINE WITHOUT AURA AND WITH STATUS MIGRAINOSUS, NOT INTRACTABLE: ICD-10-CM

## 2024-05-01 DIAGNOSIS — E03.9 HYPOTHYROIDISM, UNSPECIFIED TYPE: ICD-10-CM

## 2024-05-01 LAB — TESTOST SERPL-MSCNC: 36 NG/DL

## 2024-05-01 PROCEDURE — 82024 ASSAY OF ACTH: CPT

## 2024-05-01 PROCEDURE — 36415 COLL VENOUS BLD VENIPUNCTURE: CPT

## 2024-05-02 LAB
ACTH PLAS-MCNC: 47.8 PG/ML (ref 7.2–63.3)
DHEA-S SERPL-MCNC: 23.8 UG/DL (ref 20.4–186.6)

## 2024-05-03 ENCOUNTER — OFFICE VISIT (OUTPATIENT)
Dept: PULMONOLOGY | Facility: CLINIC | Age: 74
End: 2024-05-03
Payer: MEDICARE

## 2024-05-03 ENCOUNTER — APPOINTMENT (OUTPATIENT)
Dept: LAB | Facility: CLINIC | Age: 74
End: 2024-05-03
Payer: MEDICARE

## 2024-05-03 VITALS
DIASTOLIC BLOOD PRESSURE: 88 MMHG | TEMPERATURE: 97.7 F | WEIGHT: 155 LBS | SYSTOLIC BLOOD PRESSURE: 132 MMHG | HEIGHT: 61 IN | OXYGEN SATURATION: 97 % | BODY MASS INDEX: 29.27 KG/M2 | HEART RATE: 64 BPM

## 2024-05-03 DIAGNOSIS — G47.19 EXCESSIVE DAYTIME SLEEPINESS: ICD-10-CM

## 2024-05-03 DIAGNOSIS — J45.40 MODERATE PERSISTENT ASTHMA WITHOUT COMPLICATION: ICD-10-CM

## 2024-05-03 DIAGNOSIS — J45.40 MODERATE PERSISTENT ASTHMA WITHOUT COMPLICATION: Primary | ICD-10-CM

## 2024-05-03 LAB — COLLAGEN CTX SERPL-MCNC: 339 PG/ML

## 2024-05-03 PROCEDURE — 82785 ASSAY OF IGE: CPT

## 2024-05-03 PROCEDURE — 99214 OFFICE O/P EST MOD 30 MIN: CPT | Performed by: INTERNAL MEDICINE

## 2024-05-03 PROCEDURE — 36415 COLL VENOUS BLD VENIPUNCTURE: CPT

## 2024-05-03 PROCEDURE — 86003 ALLG SPEC IGE CRUDE XTRC EA: CPT

## 2024-05-03 RX ORDER — PREDNISONE 20 MG/1
40 TABLET ORAL DAILY
Qty: 10 TABLET | Refills: 0 | Status: SHIPPED | OUTPATIENT
Start: 2024-05-03 | End: 2024-05-08

## 2024-05-03 RX ORDER — SODIUM CHLORIDE FOR INHALATION 3 %
4 VIAL, NEBULIZER (ML) INHALATION 2 TIMES DAILY
Qty: 240 ML | Refills: 5 | Status: SHIPPED | OUTPATIENT
Start: 2024-05-03 | End: 2024-06-02

## 2024-05-03 RX ORDER — GUAIFENESIN 600 MG/1
1200 TABLET, EXTENDED RELEASE ORAL EVERY 12 HOURS SCHEDULED
Qty: 120 TABLET | Refills: 0 | Status: SHIPPED | OUTPATIENT
Start: 2024-05-03 | End: 2024-06-02

## 2024-05-03 RX ORDER — TIOTROPIUM BROMIDE INHALATION SPRAY 3.12 UG/1
2 SPRAY, METERED RESPIRATORY (INHALATION) DAILY
Qty: 4 G | Refills: 5 | Status: SHIPPED | COMMUNITY
Start: 2024-05-03

## 2024-05-03 NOTE — PROGRESS NOTES
Pulmonary Follow Up Note   Shell Mccann 73 y.o. female MRN: [id number]  5/3/2024    Assessment:   moderate persistent asthma  With mild exacerbation currently, noted more frequent  symptoms with exposure to environmental allergens  Also doing a lot of cleaning in the house with exposure to detergents, bleach and some dusts  Mild wheezing on exam  ACT score 10/25 from 23 at last visit  This is an allergic type II asthma, highest eosinophilic count was 970 cells in 10/2023  Possible underlying chronic bronchitis from prior smoking history     plan:  Step up treatment, added Spiriva Respimat/given samples  Continue Symbicort 160  Mucus clearing: Added Mucinex, and hypertonic saline nebs  Check [facility] allergy panel, possible need for Biologics  Steroid burst prednisone  40 mg for 5 days then stop  Counseled about avoiding allergens     positive ELISABETH screening  Reported excessive daytime sleepiness, fatigue   reports infrequent night snoring  Agreeable for a home sleep study/ordered        Return in about 3 months (around 8/3/2024).    History of Present Illness     Follow up for: asthma    Background  73 y.o. female with a h/o anxiety/depression, hypothyroidism, dyslipidemia, osteoporosis, migraine, former tobacco abuse     Seen in the ED 10/26 for shortness of breath and wheezing.  Before that given Z-Mingo at the urgent care which did not help.  Treated with IV steroids/nebulized inhalers.  Noted elevated eosinophilic count at 970 cells.  Chest x-ray showed no acute changes.     Reports feeling cough, dyspnea, chest tightness and severe wheezing few weeks ago.  Possible trigger is exposure to black mold while cleaning at the shower at home.  Reports history of seasonal allergies for several years, usually around spring, fall and winter.  No prior formal diagnosis of asthma or COPD.  Smoked 15-pack-year quit in the 1980s, return to smoking few cigarettes per day for 30 years quit completely in 5/2023.    "  Since the ED visit, felt better on the steroid taper, also PRN albuterol with a spacer which he uses at least 2-4 times per day.  Also reports improvement of the nocturnal coughing spells/episodes.     \"Social/exposure history  About 15-pack-year tobacco abuse history quit in the 1980s  Smoked briefly few cigarettes per day for 3 years quit in 5/2023  Nonalcoholic  She is a retired teacher  Lives in an old house, has possible exposure to mold/black in the bathroom  Also there is mold in the basement however does not go there  Pets: 1 dog for 8 years\"     10/30/2023 visit-started Symbicort 160/suspected asthma.  PFT.  Nebulizer/supplies.    12/2023 visit-continue Symbicort, ACT score 23/25, switched nebulized albuterol to PRN instead of twice daily    Interval History  Since last seen, was doing well until 3 weeks ago.    exposed toutside environmental allergens exposure in addition to cleaning inside her house, exposed to bleach and other smells.  Noted worse symptoms off frequent cough, wheezing, using PRN albuterol 2-3 times per day.  Symptoms associated with production of sticky white sputum, oftentimes difficult to produce.  Also has intermittent wheezing.  No steroid use    Review of Systems  As per hpi, all other systems reviewed and were negative    Studies:     Imaging and other studies: I have personally reviewed pertinent films in PACS        Pulmonary function testing:   PFT 11/6/2023-ratio 74%, FEV1 1.48 L / 77% FVC 2.00 L / 81% TLC 86%, RV 94%, DLCO 68%      EKG, Pathology, and Other Studies: I have personally reviewed pertinent reports.         Past medical, surgical, social and family histories reviewed.      Medications/Allergies: Reviewed      Vitals: Blood pressure 132/88, pulse 64, temperature 97.7 °F (36.5 °C), temperature source Tympanic, height 5' 1\" (1.549 m), weight 70.3 kg (155 lb), SpO2 97%, not currently breastfeeding. There is no height or weight on file to calculate BMI.  " "      Physical Exam  Body mass index is 29.29 kg/m².   Gen: not in acute distress,   Neck/Eyes: supple, no adenopathy, PERRL  Ear: normal appearance, no significant hearing impairment  Nose:  normal nasal mucosa, no drainage  Mouth:  unremarkable/normal appearance of lips, teeth and gums  Oropharynx: mucosa is moist, no focal lesions or erythema  Salivary glands: soft nontender  Chest: normal respiratory efforts,  mild expiratory wheeze at the left base/mid lung fields  CV: RRR, no murmurs appreciated, no edema  Abdomen: soft, non tender  Extremities:  No observed deformity   Skin: unremarkable  Neuro: AAO X3, no focal motor deficit        Labs:  Lab Results   Component Value Date    WBC 7.78 04/30/2024    HGB 14.7 04/30/2024    HCT 47.6 (H) 04/30/2024    MCV 95 04/30/2024     04/30/2024     Lab Results   Component Value Date    CALCIUM 10.0 04/30/2024    K 4.4 04/30/2024    CO2 32 04/30/2024     04/30/2024    BUN 18 04/30/2024    CREATININE 0.65 04/30/2024     No results found for: \"IGE\"  Lab Results   Component Value Date    ALT 18 04/30/2024    AST 19 04/30/2024    ALKPHOS 50 04/30/2024           Portions of the record may have been created with voice recognition software.  Occasional wrong word or \"sound a like\" substitutions may have occurred due to the inherent limitations of voice recognition software.  Read the chart carefully and recognize, using context, where substitutions have occurred    Feng Portillo M.D.  [facility]  Answers submitted by the patient for this visit:  Pulmonology Questionnaire (Submitted on 5/2/2024)  Chief Complaint: Primary symptoms  Chronicity: recurrent  When did you first notice your symptoms?: 1 to 4 weeks ago  How often do your symptoms occur?: intermittently  Since you first noticed this problem, how has it changed?: waxing and waning  Do you have shortness of breath that occurs with effort or exertion?: Yes  Do you have ear " congestion?: No  Do you have heartburn?: No  Do you have fatigue?: Yes  Do you have nasal congestion?: Yes  Do you have shortness of breath when lying flat?: Yes  Do you have shortness of breath when you wake up?: No  Do you have sweats?: Yes  Have you experienced weight loss?: No  Which of the following makes your symptoms worse?: change in weather, climbing stairs, emotional stress, exercise, exposure to fumes, exposure to smoke  Which of the following makes your symptoms better?: change in position, cold air, OTC cough suppressant, rest, steroid inhaler  Risk factors for lung disease: animal exposure

## 2024-05-06 DIAGNOSIS — G47.19 EXCESSIVE DAYTIME SLEEPINESS: Primary | ICD-10-CM

## 2024-05-06 LAB

## 2024-05-08 LAB
ALBUMIN SERPL BCP-MCNC: 4.1 G/DL (ref 3.5–5)
ALP SERPL-CCNC: 50 U/L (ref 34–104)
ALT SERPL W P-5'-P-CCNC: 18 U/L (ref 7–52)
ANION GAP SERPL CALCULATED.3IONS-SCNC: 5 MMOL/L (ref 4–13)
AST SERPL W P-5'-P-CCNC: 19 U/L (ref 13–39)
BILIRUB SERPL-MCNC: 0.34 MG/DL (ref 0.2–1)
BUN SERPL-MCNC: 18 MG/DL (ref 5–25)
CALCIUM SERPL-MCNC: 10 MG/DL (ref 8.4–10.2)
CHLORIDE SERPL-SCNC: 105 MMOL/L (ref 96–108)
CO2 SERPL-SCNC: 32 MMOL/L (ref 21–32)
CREAT SERPL-MCNC: 0.65 MG/DL (ref 0.6–1.3)
GFR SERPL CREATININE-BSD FRML MDRD: 89 ML/MIN/1.73SQ M
GLUCOSE P FAST SERPL-MCNC: 90 MG/DL (ref 65–99)
POTASSIUM SERPL-SCNC: 4.4 MMOL/L (ref 3.5–5.3)
PROT SERPL-MCNC: 7 G/DL (ref 6.4–8.4)
SODIUM SERPL-SCNC: 142 MMOL/L (ref 135–147)

## 2024-06-04 ENCOUNTER — OFFICE VISIT (OUTPATIENT)
Dept: ENDOCRINOLOGY | Facility: CLINIC | Age: 74
End: 2024-06-04
Payer: MEDICARE

## 2024-06-04 VITALS
BODY MASS INDEX: 29.07 KG/M2 | SYSTOLIC BLOOD PRESSURE: 130 MMHG | DIASTOLIC BLOOD PRESSURE: 76 MMHG | WEIGHT: 154 LBS | OXYGEN SATURATION: 96 % | HEART RATE: 84 BPM | HEIGHT: 61 IN

## 2024-06-04 DIAGNOSIS — M81.0 OSTEOPOROSIS, UNSPECIFIED OSTEOPOROSIS TYPE, UNSPECIFIED PATHOLOGICAL FRACTURE PRESENCE: Primary | ICD-10-CM

## 2024-06-04 DIAGNOSIS — E55.9 VITAMIN D INSUFFICIENCY: ICD-10-CM

## 2024-06-04 DIAGNOSIS — E03.9 HYPOTHYROIDISM, UNSPECIFIED TYPE: ICD-10-CM

## 2024-06-04 PROCEDURE — 99214 OFFICE O/P EST MOD 30 MIN: CPT | Performed by: STUDENT IN AN ORGANIZED HEALTH CARE EDUCATION/TRAINING PROGRAM

## 2024-06-04 NOTE — PROGRESS NOTES
Shell Mccann 73 y.o. female MRN: [id number]    Encounter: [id number]      Assessment & Plan     1. Osteoporosis with history of fractures - challenging case. Shell has history of long-term bisphosphonate use with boniva over 20 years duration. T-score at L-spine remains <-3, although overall BMD appears to be stable since 2016. She has been recommended against anti-resorptive therapy by her periodontist. I am requesting an updated DXA for October 2024. Can consider use of anabolic, however would need to consider sequential therapy options thereafter    2. History of chronic bisphosphonate use    3. History of dental implants - no immediate plan for any further dental surgeries, however patient advised by periodontist to not pursue additional treatment with anti-resorptive therapy. A risk:benefit conversation will have to be considered    4. Premature menopause - this is likely the chief contributor to state of her osteoporosis    Problem List Items Addressed This Visit    None  Visit Diagnoses     Osteoporosis, unspecified osteoporosis type, unspecified pathological fracture presence    -  Primary    Relevant Orders    DXA bone density spine hip and pelvis    Vitamin D insufficiency        Hypothyroidism, unspecified type              RTC 4-mo    CC: Osteoporosis    History of Present Illness     HPI:    Shell returns today for follow up osteoporosis. She has been following with pulmonology for a cough and has been prescribed inhalers. She also reports recent bout of cellulitis. She mentions weight gain of around 25 lb. She is on ca and vit D supplementation.  She has history of long-term bisphosphonate use with boniva >20 years. She had been considered for reclast, but deferred on the recommendation of her periodontist.     Review of Systems   Constitutional:  Negative for diaphoresis and unexpected weight change.   HENT:  Negative for trouble swallowing and voice change.    Respiratory:  Negative for  shortness of breath.    Cardiovascular:  Negative for chest pain.   Gastrointestinal:  Negative for nausea and vomiting.   Endocrine: Negative for polydipsia and polyuria.   Psychiatric/Behavioral:  Negative for agitation.    All other systems reviewed and are negative.      Historical Information   Past Medical History:   Diagnosis Date   • Allergic     Seasonal   • Arthritis ?   • Asthma    • Cellulitis 2024   • Depression    • Disease of thyroid gland    • Diverticulitis of colon About 10 yrs ago    Severe   • Headache(784.0) When I was 14 yrs old    Migraines   • Hypercholesteremia    • Migraine    • Osteoporosis      Past Surgical History:   Procedure Laterality Date   • COLON SURGERY      Prolapse rectim   • FINGER FRACTURE SURGERY     • FRACTURE SURGERY      Lower left plate,9 screws,2 pins   • HYSTERECTOMY     • LEG SURGERY     • RECTAL PROLAPSE REPAIR     • TOTAL ABDOMINAL HYSTERECTOMY W/ BILATERAL SALPINGOOPHORECTOMY       Social History   Social History     Substance and Sexual Activity   Alcohol Use Not Currently    Comment: social     Social History     Substance and Sexual Activity   Drug Use No     Social History     Tobacco Use   Smoking Status Former   • Current packs/day: 0.00   • Average packs/day: 0.3 packs/day for 15.0 years (3.8 ttl pk-yrs)   • Types: Cigarettes   • Start date: 2008   • Quit date: 2023   • Years since quittin.1   • Passive exposure: Past   Smokeless Tobacco Never   Tobacco Comments    Started smoking at 17yrs. Stopped at 32yrs. Smoked for a few years later in life     Family History:   Family History   Problem Relation Age of Onset   • Vision loss Mother    • Glaucoma Mother    • Migraines Mother    • Osteoporosis Mother    • Cancer Father    • Asthma Father         Legionaries disease, emphysema   • COPD Father         Legionnaires disease   • Hearing loss Father    • Ovarian cancer Maternal Grandmother    • Migraines Maternal Grandfather    • No  Known Problems Paternal Grandmother    • No Known Problems Paternal Grandfather    • No Known Problems Paternal Aunt    • No Known Problems Paternal Aunt    • BRCA2 Positive Neg Hx    • BRCA2 Negative Neg Hx    • BRCA1 Negative Neg Hx    • BRCA1 Positive Neg Hx    • BRCA 1/2 Neg Hx    • Endometrial cancer Neg Hx    • Colon cancer Neg Hx    • Breast cancer additional onset Neg Hx    • Breast cancer Neg Hx        Meds/Allergies   Current Outpatient Medications   Medication Sig Dispense Refill   • albuterol (Ventolin HFA) 90 mcg/act inhaler Inhale 2 puffs every 4 (four) hours as needed for wheezing 18 g 0   • budesonide-formoterol (Symbicort) 160-4.5 mcg/act inhaler Inhale 2 puffs 2 (two) times a day Rinse mouth after use. 10.2 g 5   • buPROPion (WELLBUTRIN XL) 300 mg 24 hr tablet Take 1 tablet (300 mg total) by mouth every morning 30 tablet 5   • butalbital-acetaminophen-caffeine (FIORICET,ESGIC) -40 mg per tablet Take 1 tablet by mouth every 6 (six) hours as needed for headaches 60 tablet 1   • Cholecalciferol (VITAMIN D PO) Take 5,000 Units by mouth in the morning     • Coenzyme Q10 (COQ10) 100 MG CAPS Take by mouth in the morning     • ezetimibe-simvastatin (VYTORIN) 10-40 mg per tablet TAKE 1 TABLET BY MOUTH DAILY AT BEDTIME 90 tablet 1   • fenofibrate micronized (ANTARA) 130 MG capsule TAKE 1 CAPSULE (130 MG TOTAL) BY MOUTH DAILY WITH BREAKFAST 90 capsule 1   • FLUoxetine (PROzac) 20 mg capsule Take 1 capsule (20 mg total) by mouth daily 90 capsule 1   • levothyroxine 50 mcg tablet TAKE 1 TABLET (50 MCG TOTAL) BY MOUTH DAILY 90 tablet 1   • Magnesium 100 MG TABS Take by mouth in the morning     • Multiple Vitamin (MULTI-VITAMIN DAILY PO) once daily     • Omega-3 1000 MG CAPS Take by mouth in the morning     • Potassium Gluconate 80 MG TABS Take by mouth in the morning     • tiotropium (Spiriva Respimat) 2.5 MCG/ACT AERS inhaler Inhale 2 puffs daily 4 g 5   • guaiFENesin (MUCINEX) 600 mg 12 hr tablet Take 2  "tablets (1,200 mg total) by mouth every 12 (twelve) hours 60 tablet 5   • metoprolol succinate (Toprol XL) 100 mg 24 hr tablet Take 1 tablet (100 mg total) by mouth daily 30 tablet 5   • polyethylene glycol (Golytely) 4000 mL solution Take 4,000 mL by mouth once for 1 dose Take 4000 mL by mouth once for 1 dose. Use as directed 4000 mL 0   • SUMAtriptan (IMITREX) 6 mg/0.5 mL Inject 0.5 mL (6 mg total) under the skin once for 1 dose 0.5 mL 1     No current facility-administered medications for this visit.     Allergies   Allergen Reactions   • Bee Venom    • Latex    • Pollen Extract Other (See Comments)     Seasonal allergies       Objective   Vitals: Blood pressure 130/76, pulse 84, height 5' 1\" (1.549 m), weight 69.9 kg (154 lb), SpO2 96%, not currently breastfeeding.    Physical Exam  Vitals reviewed.   Constitutional:       Appearance: Normal appearance.   HENT:      Head: Normocephalic and atraumatic.      Nose: Nose normal.   Eyes:      Conjunctiva/sclera: Conjunctivae normal.   Pulmonary:      Effort: Pulmonary effort is normal. No respiratory distress.   Skin:     General: Skin is warm and dry.   Neurological:      General: No focal deficit present.      Mental Status: She is alert.   Psychiatric:         Mood and Affect: Mood normal.         Behavior: Behavior normal.         The history was obtained from the review of the chart, patient.    Lab Results:     Component      Latest Ref [facility] 4/30/2024   WBC      4.31 - 10.16 Thousand/uL 7.78    RBC      3.81 - 5.12 Million/uL 4.99    Hemoglobin      11.5 - 15.4 g/dL 14.7    Hematocrit      34.8 - 46.1 % 47.6 (H)    MCV      82 - 98 fL 95    MCH      26.8 - 34.3 pg 29.5    MCHC      31.4 - 37.4 g/dL 30.9 (L)    RDW      11.6 - 15.1 % 13.7    MPV      8.9 - 12.7 fL 11.9    Platelet Count      149 - 390 Thousands/uL 205    nRBC      /100 WBCs 0    Segmented %      43 - 75 % 66    Immature Grans %      0 - 2 % 0    Lymphocytes %      14 - 44 % 23    Monocytes %      " 4 - 12 % 6    Eosinophils %      0 - 6 % 4    Basophils %      0 - 1 % 1    Absolute Neutrophils      1.85 - 7.62 Thousands/µL 5.15    Absolute Immature Grans      0.00 - 0.20 Thousand/uL 0.01    Absolute Lymphocytes      0.60 - 4.47 Thousands/µL 1.81    Absolute Monocytes      0.17 - 1.22 Thousand/µL 0.44    Absolute Eosinophils      0.00 - 0.61 Thousand/µL 0.32    Absolute Basophils      0.00 - 0.10 Thousands/µL 0.05    Sodium      135 - 147 mmol/L 142    Potassium      3.5 - 5.3 mmol/L 4.4    Chloride      96 - 108 mmol/L 105    Carbon Dioxide      21 - 32 mmol/L 32    ANION GAP      4 - 13 mmol/L 5    BUN      5 - 25 mg/dL 18    Creatinine      0.60 - 1.30 mg/dL 0.65    GLUCOSE, FASTING      65 - 99 mg/dL 90    Calcium      8.4 - 10.2 mg/dL 10.0    AST      13 - 39 U/L 19    ALT      7 - 52 U/L 18    ALK PHOS      34 - 104 U/L 50    Total Protein      6.4 - 8.4 g/dL 7.0    Albumin      3.5 - 5.0 g/dL 4.1    Total Bilirubin      0.20 - 1.00 mg/dL 0.34    GFR, Calculated      ml/min/1.73sq m 89    FREE T4      0.61 - 1.12 ng/dL 0.74    C-TELOPEPTIDE,SERUM      pg/mL 339    Calcium, Ionized      1.12 - 1.32 mmol/L 1.28    PTH      12.0 - 88.0 pg/mL 57.9    VITAMIN D, 25-HYDROXY      30.0 - 100.0 ng/mL 43.6    Phosphorus      2.3 - 4.1 mg/dL 3.7       Legend:  (H) High  (L) Low    Imaging Studies:   ?  ?  ?  Results for orders placed during the hospital encounter of 10/03/22    DXA bone density spine hip and pelvis    Impression  1.  Osteoporosis.    2.  Since a DXA study from 9/29/2020, there has been:  A  STATISTICALLY SIGNIFICANT DECREASE in bone mineral density of  -0.028 g/cm2 (-3.4)% in the hips.        3.  The 10 year risk of hip fracture is 6.2% with the 10 year risk of major osteoporotic fracture being 21.4% as calculated by the [employer]/WHO fracture risk assessment tool (FRAX).    4.  The current NOF guidelines recommend treating patients with a T-score of -2.5 or less in the lumbar spine or  hips, or in post-menopausal women and men over the age of 50 with low bone mass (osteopenia) and a FRAX 10 year risk score of >3% for hip  fracture and/or >20% for major osteoporotic fracture.    5.  The NOF recommends follow-up DXA in 1-2 years after initiating therapy for osteoporosis and every 2 years thereafter. More frequent evaluation is appropriate for patients with conditions associated with rapid bone loss, such as glucocorticoid  therapy. The interval between DXA screenings may be longer for individuals without major risk factors and initial T-score in the normal or upper low bone mass range.      The FRAX algorithm has certain limitations:  -FRAX has not been validated in patients currently or previously treated with pharmacotherapy for osteoporosis.  In such patients, clinical judgment must be exercised in interpreting FRAX scores.  -Prior hip, vertebral and humeral fragility fractures appear to confer greater risk of subsequent fracture than fractures at other sites (this is especially true for individuals with severe vertebral fractures), but quantification of this incremental  risk is not possible with FRAX.  -FRAX underestimates fracture risk in patients with history of multiple fragility fractures.  -FRAX may underestimate fracture risk in patients with history of frequent falls.  -It is not appropriate to use FRAX to monitor treatment response.      WHO CLASSIFICATION:  Normal (a T-score of -1.0 or higher)  Low bone mineral density (a T-score of less than -1.0 but higher than -2.5)  Osteoporosis (a T-score of -2.5 or less)  Severe osteoporosis (a T-score of -2.5 or less with a fragility fracture)    LEAST SIGNIFICANT CHANGE AT 95% C.I:  Lumbar spine: 0.030 gm/cm2 (2.5%).  Total hip: 0.015 gm/cm2 (1.7%).  Forearm: 0.034 gm/cm2 (5.2%).          Workstation performed: [id number]    ?  ?       ?    I have personally reviewed pertinent reports.      Portions of the record may have been created with voice  "recognition software. Occasional wrong word or \"sound a like\" substitutions may have occurred due to the inherent limitations of voice recognition software. Read the chart carefully and recognize, using context, where substitutions have occurred.  "

## 2024-06-05 ENCOUNTER — ESTABLISHED COMPREHENSIVE EXAM (OUTPATIENT)
Dept: URBAN - METROPOLITAN AREA CLINIC 6 | Facility: CLINIC | Age: 74
End: 2024-06-05

## 2024-06-05 DIAGNOSIS — Z96.1: ICD-10-CM

## 2024-06-05 PROCEDURE — 92014 COMPRE OPH EXAM EST PT 1/>: CPT

## 2024-06-05 ASSESSMENT — VISUAL ACUITY
OD_CC: 20/25-2
OS_CC: 20/40-2

## 2024-06-05 ASSESSMENT — TONOMETRY
OD_IOP_MMHG: 12
OS_IOP_MMHG: 8

## 2024-06-06 ENCOUNTER — CONSULT (OUTPATIENT)
Age: 74
End: 2024-06-06
Payer: MEDICARE

## 2024-06-06 VITALS
BODY MASS INDEX: 29.07 KG/M2 | HEIGHT: 61 IN | DIASTOLIC BLOOD PRESSURE: 86 MMHG | HEART RATE: 97 BPM | WEIGHT: 154 LBS | TEMPERATURE: 97.7 F | SYSTOLIC BLOOD PRESSURE: 142 MMHG | OXYGEN SATURATION: 96 %

## 2024-06-06 DIAGNOSIS — K59.04 CHRONIC IDIOPATHIC CONSTIPATION: ICD-10-CM

## 2024-06-06 DIAGNOSIS — Z12.11 COLON CANCER SCREENING: Primary | ICD-10-CM

## 2024-06-06 DIAGNOSIS — Z86.010 HISTORY OF COLON POLYPS: ICD-10-CM

## 2024-06-06 PROBLEM — Z86.0100 HISTORY OF COLON POLYPS: Status: ACTIVE | Noted: 2024-06-06

## 2024-06-06 PROBLEM — R14.0 ABDOMINAL DISTENSION: Status: RESOLVED | Noted: 2023-06-29 | Resolved: 2024-06-06

## 2024-06-06 PROCEDURE — 99204 OFFICE O/P NEW MOD 45 MIN: CPT | Performed by: NURSE PRACTITIONER

## 2024-06-06 RX ORDER — POLYETHYLENE GLYCOL 3350, SODIUM SULFATE ANHYDROUS, SODIUM BICARBONATE, SODIUM CHLORIDE, POTASSIUM CHLORIDE 236; 22.74; 6.74; 5.86; 2.97 G/4L; G/4L; G/4L; G/4L; G/4L
4000 POWDER, FOR SOLUTION ORAL ONCE
Qty: 4000 ML | Refills: 0 | Status: SHIPPED | OUTPATIENT
Start: 2024-06-06 | End: 2024-06-06

## 2024-06-06 NOTE — PROGRESS NOTES
[facility] - Outpatient Consultation  Shell Mccann 73 y.o. female MRN: [id number]  Encounter: [id number]          ASSESSMENT AND PLAN:    The patient presents today for an initial consultation for her colon cancer screening with a history of colon polyps and chronic idiopathic constipation.     Exam:  Oral mucosa normal upon visual inspection, without any sores, lesions, or ulcerations. Sclera without icterus and benign. Lung sounds CTA b/l. Normal S1 & S2 upon exam. Abdomen softly distended, with mild tenderness on upon exam in the periumbilical area with what appears to be a small possible incisional or periumbilical hernia that is reproducible, with mild guarding, without any significant rebound tenderness and upon exam, with faint bowel sounds x 4. No edema noted of the b/l lower extremities upon exam today. Skin is non-icteric.     1. Colon cancer screening  -     Ambulatory Referral to Gastroenterology  -     Colonoscopy; Future; Expected date: 06/06/2024  -     polyethylene glycol (Golytely) 4000 mL solution; Take 4,000 mL by mouth once for 1 dose Take 4000 mL by mouth once for 1 dose. Use as directed  2. History of colon polyps  Assessment & Plan:  While the patient was in the office today, I discussed with the patient that at this point time since she is due for repeat colonoscopy and does have a personal history of colon polyps, we will proceed with a colonoscopy for colon cancer screening at this time.  The patient was agreeable and verbalized an understanding.    I obtained informed consent from the patient. The risks/benefits/alternatives of the procedure were discussed with the patient. Risks included, but not limited to, infection, bleeding, perforation, injury to organs in the abdomen, missed lesion, and incomplete procedure were discussed. The patient was made aware that there are alternative colon cancer screening options available that either they do  not qualify for or it is the patient's preference to proceed with a colonoscopy at this time. The patient gave verbal understanding and is agreeable to proceed. Bowel prep instructions were reviewed and given as ordered. Patient's questions were answered, and electronic signature obtained.   Orders:  -     Colonoscopy; Future; Expected date: 06/06/2024  -     polyethylene glycol (Golytely) 4000 mL solution; Take 4,000 mL by mouth once for 1 dose Take 4000 mL by mouth once for 1 dose. Use as directed  3. Chronic idiopathic constipation  Assessment & Plan:  Discussed with the patient that at this point I deftly feel that she has a huge component of chronic constipation and feel that the following recommendations would be in her best interest to try to obtain a goal of a relieving, nontrending bowel movement at least every other day, if not daily:    Start MiraLAX daily.  Increase daily water intake to at least 5 to 6 glasses a day if possible.  Continue Metamucil/fiber supplementation daily.  Work on slowly and steadily increasing activity, as tolerated, allowing her body to be her guide.    The patient will schedule a follow up office visit after her colonoscopy, but understands to call or contact our office if there are any issues or concerns in the mean time.    ______________________________________________________________________    HPI: The patient is a 73 y.o. female who presents today for a consultation regarding her colon cancer screening with a history of colon polyps and chronic idiopathic constipation.  The patient reports that since her hysterectomy at the age of 26 she has had a longstanding history of constipation.  The patient also reports that she eventually had to deal with a rectal prolapse and had to undergo surgery twice with the most recent being March 2023 with Dr. Gomes at [facility].  She reports that since that surgery there has been complete resolution of her rectal  prolapse, however, she feels that because of coughing with the COPD and asthma she has developed a periumbilical incisional hernia.  She does report that at times it is uncomfortable and that she cannot wear tight waist to close such as jeans because of the distention and bloating.  She does report that she has been using Metamucil Gummies to try to help with her bowel movements, however, even though she typically does have some kind of a bowel movement every day it is usually small amounts, not necessarily relieving, and just always feels as though she is never completely empty.    The patient denies any reflux, nausea, dysphagia, vomiting, decreased appetite, orunplanned weight loss. Water Intake: 3-4 glasses daily.     The patient reports that they have a BM daily, occasionally 2 times a day, small amounts in small amounts and reports that it sometimes relieving, without any (bloating, consistent diarrhea, nocturnal BMs, constipation, straining, melena or bloody stools). Robert Lee: 1. Last BM: This morning. Flatus: Intermittently.    PMH/PSH:   Abdominal/Chest Surgery: Total hysterectomy/oophorectomy, rectal prolapse repair surgery x 2.  Colon Cancer: Denied  Any Cancer: Denied  Pre-Cancerous Polyps: Denied  Crohn's: Denied  Ulcerative Colitis: Denied  Louie's Esophagus: N/A  Celiac Disease: N/A  Liver Disease: Denied    Tobacco/Vaping: Denied  ETOH: Very occasionally  Marijuana: Denied  Illicit Drug Use: Denied    FH:  Colon Cancer: Denied  Any Cancer: Maternal Grandmother: Ovarian  Family Members with Pre-Cancerous Polyps: Denied  Crohn's: Denied  Ulcerative Colitis: Denied  Celiac Disease: Denied  Liver Disease: Denied    Meds: None  Daily NSAID Use: Denied  Any New Meds: Albuterol  Daily Tylenol Use: Denied    Imaging: (7/7/23) CT of the abdomen pelvis without contrast: Nondistended stomach with apparent wall thickening of the stomach pyloric wall. Correlate with GI symptoms.  Stool throughout the colon.  No  other acute intra-abdominal finding.    Endoscopy History: EGD: (None):    COLONOSCOPY: (5/20/21): Normal with rectal prolapse noted.  With a recommendation for repeat colonoscopy in 3 years secondary to her history of colon polyps.    DUE: 5/20/24    REVIEW OF SYSTEMS:    CONSTITUTIONAL: Denies any fever, chills, rigors, and weight loss.  HEENT: No earache or tinnitus. Denies hearing loss or visual disturbances.  CARDIOVASCULAR: No chest pain or palpitations.   RESPIRATORY: Denies any cough, hemoptysis, shortness of breath or dyspnea on exertion.  GASTROINTESTINAL: As noted in the History of Present Illness.   GENITOURINARY: No problems with urination. Denies any hematuria or dysuria.  NEUROLOGIC: No dizziness or vertigo, denies headaches.   MUSCULOSKELETAL: Denies any muscle or joint pain.   SKIN: Denies skin rashes or itching.   ENDOCRINE: Denies excessive thirst. Denies intolerance to heat or cold.  PSYCHOSOCIAL: Denies depression or anxiety. Denies any recent memory loss.       Historical Information   Past Medical History:   Diagnosis Date    Allergic     Seasonal    Arthritis ?    Asthma     Cellulitis 03/06/2024    Depression     Disease of thyroid gland     Diverticulitis of colon About 10 yrs ago    Severe    Headache(784.0) When I was 14 yrs old    Migraines    Hypercholesteremia     Migraine     Osteoporosis      Past Surgical History:   Procedure Laterality Date    COLON SURGERY  2021    Prolapse rectim    FINGER FRACTURE SURGERY      FRACTURE SURGERY  2015    Lower left plate,9 screws,2 pins    HYSTERECTOMY      LEG SURGERY      RECTAL PROLAPSE REPAIR      TOTAL ABDOMINAL HYSTERECTOMY W/ BILATERAL SALPINGOOPHORECTOMY       Social History   Social History     Substance and Sexual Activity   Alcohol Use Not Currently    Comment: social     Social History     Substance and Sexual Activity   Drug Use No     Social History     Tobacco Use   Smoking Status Former    Current packs/day: 0.00    Average  packs/day: 0.3 packs/day for 15.0 years (3.8 ttl pk-yrs)    Types: Cigarettes    Start date: 2008    Quit date: 2023    Years since quittin.1    Passive exposure: Past   Smokeless Tobacco Never   Tobacco Comments    Started smoking at 17yrs. Stopped at 32yrs. Smoked for a few years later in life     Family History   Problem Relation Age of Onset    Vision loss Mother     Glaucoma Mother     Cancer Father     Asthma Father         Legionaries disease    COPD Father         Legionnaires disease    Hearing loss Father     Ovarian cancer Maternal Grandmother     No Known Problems Maternal Grandfather     No Known Problems Paternal Grandmother     No Known Problems Paternal Grandfather     No Known Problems Paternal Aunt     No Known Problems Paternal Aunt     BRCA2 Positive Neg Hx     BRCA2 Negative Neg Hx     BRCA1 Negative Neg Hx     BRCA1 Positive Neg Hx     BRCA 1/2 Neg Hx     Endometrial cancer Neg Hx     Colon cancer Neg Hx     Breast cancer additional onset Neg Hx     Breast cancer Neg Hx        Meds/Allergies       Current Outpatient Medications:     albuterol (Ventolin HFA) 90 mcg/act inhaler    budesonide-formoterol (Symbicort) 160-4.5 mcg/act inhaler    buPROPion (WELLBUTRIN XL) 300 mg 24 hr tablet    butalbital-acetaminophen-caffeine (FIORICET,ESGIC) -40 mg per tablet    Cholecalciferol (VITAMIN D PO)    Coenzyme Q10 (COQ10) 100 MG CAPS    ezetimibe-simvastatin (VYTORIN) 10-40 mg per tablet    fenofibrate micronized (ANTARA) 130 MG capsule    FLUoxetine (PROzac) 20 mg capsule    levothyroxine 50 mcg tablet    Magnesium 100 MG TABS    metoprolol succinate (Toprol XL) 100 mg 24 hr tablet    Multiple Vitamin (MULTI-VITAMIN DAILY PO)    Omega-3 1000 MG CAPS    Potassium Gluconate 80 MG TABS    SUMAtriptan (IMITREX) 6 mg/0.5 mL    tiotropium (Spiriva Respimat) 2.5 MCG/ACT AERS inhaler    Allergies   Allergen Reactions    Bee Venom     Latex     Pollen Extract Other (See Comments)     Seasonal  allergies           Objective     not currently breastfeeding. There is no height or weight on file to calculate BMI.        PHYSICAL EXAM:      General Appearance:   Alert, cooperative, no distress   HEENT:   Normocephalic, atraumatic, anicteric.     Neck:  Supple, symmetrical, trachea midline   Lungs:   Clear to auscultation bilaterally; no rales, rhonchi or wheezing; respirations unlabored    Heart::   Regular rate and rhythm; no murmur, rub, or gallop.   Abdomen:   Soft, non-tender, non-distended; normal bowel sounds; no masses, no organomegaly    Genitalia:   Deferred    Rectal:   Deferred    Extremities:  No cyanosis, clubbing or edema    Pulses:  2+ and symmetric    Skin:  No jaundice, rashes, or lesions    Lymph nodes:  No palpable cervical lymphadenopathy        Lab Results:   No visits with results within 1 Day(s) from this visit.   Latest known visit with results is:   Appointment on 05/03/2024   Component Date Value    A.ALTERNATA 05/03/2024 <0.10     A.FUMIGATUS 05/03/2024 <0.10     [place] Grass 05/03/2024 <0.10     [place]  05/03/2024 <0.10     Cat Epithellium-Dander 05/03/2024 <0.10     C.HERBARUM 05/03/2024 <0.10     Cockroach 05/03/2024 <0.10     Common Silver Birch 05/03/2024 <0.10     [place] 05/03/2024 <0.10     D. farinae 05/03/2024 <0.10     D. pteronyssinus 05/03/2024 <0.10     Dog Dander 05/03/2024 <0.10     Elm IgE 05/03/2024 <0.10     [place] Tree 05/03/2024 <0.10     Mugwort 05/03/2024 <0.10     [place] Tree 05/03/2024 <0.10     Oak 05/03/2024 <0.10     P.CHRYSOGENUM 05/03/2024 <0.10     Rough Pigweed  IgE 05/03/2024 <0.10     Common Ragweed 05/03/2024 <0.10     Sheep Sorrel IgE 05/03/2024 <0.10     [place] Tree 05/03/2024 <0.10     [place] Grass 05/03/2024 <0.10     [place] Tree 05/03/2024 <0.10     White Thiago Tree 05/03/2024 <0.10     IgE 05/03/2024 930 (H)     MOUSE URINE 05/03/2024 <0.10          Radiology Results:   No results found.

## 2024-06-06 NOTE — PATIENT INSTRUCTIONS
Proceed with colonoscopy.   Start Miralax daily.   Increase water to at least 5-6 glasses daily.   Schedule a f/u OV after Colonoscopy.

## 2024-06-06 NOTE — ASSESSMENT & PLAN NOTE
While the patient was in the office today, I discussed with the patient that at this point time since she is due for repeat colonoscopy and does have a personal history of colon polyps, we will proceed with a colonoscopy for colon cancer screening at this time.  The patient was agreeable and verbalized an understanding.    I obtained informed consent from the patient. The risks/benefits/alternatives of the procedure were discussed with the patient. Risks included, but not limited to, infection, bleeding, perforation, injury to organs in the abdomen, missed lesion, and incomplete procedure were discussed. The patient was made aware that there are alternative colon cancer screening options available that either they do not qualify for or it is the patient's preference to proceed with a colonoscopy at this time. The patient gave verbal understanding and is agreeable to proceed. Bowel prep instructions were reviewed and given as ordered. Patient's questions were answered, and electronic signature obtained.

## 2024-06-06 NOTE — ASSESSMENT & PLAN NOTE
Discussed with the patient that at this point I deftly feel that she has a huge component of chronic constipation and feel that the following recommendations would be in her best interest to try to obtain a goal of a relieving, nontrending bowel movement at least every other day, if not daily:    Start MiraLAX daily.  Increase daily water intake to at least 5 to 6 glasses a day if possible.  Continue Metamucil/fiber supplementation daily.  Work on slowly and steadily increasing activity, as tolerated, allowing her body to be her guide.

## 2024-06-12 ENCOUNTER — TELEPHONE (OUTPATIENT)
Age: 74
End: 2024-06-12

## 2024-06-12 DIAGNOSIS — J45.40 MODERATE PERSISTENT ASTHMA WITHOUT COMPLICATION: ICD-10-CM

## 2024-06-12 RX ORDER — TIOTROPIUM BROMIDE INHALATION SPRAY 3.12 UG/1
2 SPRAY, METERED RESPIRATORY (INHALATION) DAILY
Qty: 4 G | Refills: 5 | Status: CANCELLED | OUTPATIENT
Start: 2024-06-12

## 2024-06-12 NOTE — TELEPHONE ENCOUNTER
Pt would like a new script sent in for guaifenesin. She would also like more samples for the Spiriva Respimat

## 2024-06-13 RX ORDER — GUAIFENESIN 600 MG/1
1200 TABLET, EXTENDED RELEASE ORAL EVERY 12 HOURS SCHEDULED
Qty: 60 TABLET | Refills: 5 | Status: SHIPPED | OUTPATIENT
Start: 2024-06-13

## 2024-06-14 ENCOUNTER — ANNUAL EXAM (OUTPATIENT)
Dept: OBGYN CLINIC | Facility: MEDICAL CENTER | Age: 74
End: 2024-06-14
Payer: MEDICARE

## 2024-06-14 VITALS
BODY MASS INDEX: 29.79 KG/M2 | DIASTOLIC BLOOD PRESSURE: 80 MMHG | HEIGHT: 61 IN | WEIGHT: 157.8 LBS | SYSTOLIC BLOOD PRESSURE: 142 MMHG

## 2024-06-14 DIAGNOSIS — Z12.31 ENCOUNTER FOR SCREENING MAMMOGRAM FOR BREAST CANCER: ICD-10-CM

## 2024-06-14 DIAGNOSIS — Z01.419 ENCOUNTER FOR GYNECOLOGICAL EXAMINATION: Primary | ICD-10-CM

## 2024-06-14 PROCEDURE — G0101 CA SCREEN;PELVIC/BREAST EXAM: HCPCS | Performed by: OBSTETRICS & GYNECOLOGY

## 2024-06-14 NOTE — PROGRESS NOTES
ASSESSMENT & PLAN: Shell Mccann is a 73 y.o.  with normal gynecologic exam.    1.  Routine well woman exam done today  2.  Pap and HPV:  The patient's last pap and hpv was years ago .    It was normal.    Pap with cotesting was not done today.    Current ASCCP Guidelines reviewed. - hysterectomy    3.  Mammogram ordered  4.  Colorectal cancer screening was notordered.  5.  The following were reviewed in today's visit: breast self exam, mammography screening ordered, menopause, osteoporosis, adequate intake of calcium and vitamin D, exercise, and healthy diet.   CC:  Annual Gynecologic Examination    HPI: Shell Mccann is a 73 y.o.  who presents for annual gynecologic examination.  She has the following concerns:  hernia at incision from her  bowel surgery  - has scheduled follow  up with PCP Monday     Health Maintenance:    She wears her seatbelt routinely.    She does perform regular monthly self breast exams.    She feels safe at home.       Past Medical History:   Diagnosis Date    Allergic     Seasonal    Arthritis ?    Asthma     Cellulitis 2024    Depression     Disease of thyroid gland     Diverticulitis of colon About 10 yrs ago    Severe    Headache(784.0) When I was 14 yrs old    Migraines    Hypercholesteremia     Migraine     Osteoporosis        Past Surgical History:   Procedure Laterality Date    COLON SURGERY      Prolapse rectim    FINGER FRACTURE SURGERY      FRACTURE SURGERY      Lower left plate,9 screws,2 pins    HYSTERECTOMY      LEG SURGERY      RECTAL PROLAPSE REPAIR      TOTAL ABDOMINAL HYSTERECTOMY W/ BILATERAL SALPINGOOPHORECTOMY         Past OB/Gyn History:  OB History          0    Para   0    Term   0       0    AB   0    Living   0         SAB   0    IAB   0    Ectopic   0    Multiple   0    Live Births   0                   Family History   Problem Relation Age of Onset    Vision loss Mother     Glaucoma Mother     Migraines Mother      Osteoporosis Mother     Cancer Father     Asthma Father         Legionaries disease, emphysema    COPD Father         Legionnaires disease    Hearing loss Father     Ovarian cancer Maternal Grandmother     Migraines Maternal Grandfather     No Known Problems Paternal Grandmother     No Known Problems Paternal Grandfather     No Known Problems Paternal Aunt     No Known Problems Paternal Aunt     BRCA2 Positive Neg Hx     BRCA2 Negative Neg Hx     BRCA1 Negative Neg Hx     BRCA1 Positive Neg Hx     BRCA 1/2 Neg Hx     Endometrial cancer Neg Hx     Colon cancer Neg Hx     Breast cancer additional onset Neg Hx     Breast cancer Neg Hx        Social History:  Social History     Socioeconomic History    Marital status: /Civil Union     Spouse name: Not on file    Number of children: Not on file    Years of education: Not on file    Highest education level: Not on file   Occupational History     Comment: retired teacher   Tobacco Use    Smoking status: Former     Current packs/day: 0.00     Average packs/day: 0.3 packs/day for 15.0 years (3.8 ttl pk-yrs)     Types: Cigarettes     Start date: 2008     Quit date: 2023     Years since quittin.1     Passive exposure: Past    Smokeless tobacco: Never    Tobacco comments:     Started smoking at 17yrs. Stopped at 32yrs. Smoked for a few years later in life   Vaping Use    Vaping status: Never Used   Substance and Sexual Activity    Alcohol use: Not Currently     Comment: social    Drug use: No    Sexual activity: Not Currently     Partners: Male     Birth control/protection: None   Other Topics Concern    Not on file   Social History Narrative    Not on file     Social Determinants of Health     Financial Resource Strain: Low Risk  (2023)    Overall Financial Resource Strain (CARDIA)     Difficulty of Paying Living Expenses: Not very hard   Food Insecurity: Not on file   Transportation Needs: No Transportation Needs (2023)    PRAPARE -  Transportation     Lack of Transportation (Medical): No     Lack of Transportation (Non-Medical): No   Physical Activity: Not on file   Stress: Not on file   Social Connections: Not on file   Intimate Partner Violence: Not on file   Housing Stability: Not on file       Allergies   Allergen Reactions    Bee Venom     Latex     Pollen Extract Other (See Comments)     Seasonal allergies         Current Outpatient Medications:     albuterol (Ventolin HFA) 90 mcg/act inhaler, Inhale 2 puffs every 4 (four) hours as needed for wheezing, Disp: 18 g, Rfl: 0    budesonide-formoterol (Symbicort) 160-4.5 mcg/act inhaler, Inhale 2 puffs 2 (two) times a day Rinse mouth after use., Disp: 10.2 g, Rfl: 5    buPROPion (WELLBUTRIN XL) 300 mg 24 hr tablet, Take 1 tablet (300 mg total) by mouth every morning, Disp: 30 tablet, Rfl: 5    butalbital-acetaminophen-caffeine (FIORICET,ESGIC) -40 mg per tablet, Take 1 tablet by mouth every 6 (six) hours as needed for headaches, Disp: 60 tablet, Rfl: 1    Cholecalciferol (VITAMIN D PO), Take 5,000 Units by mouth in the morning, Disp: , Rfl:     Coenzyme Q10 (COQ10) 100 MG CAPS, Take by mouth in the morning, Disp: , Rfl:     ezetimibe-simvastatin (VYTORIN) 10-40 mg per tablet, TAKE 1 TABLET BY MOUTH DAILY AT BEDTIME, Disp: 90 tablet, Rfl: 1    fenofibrate micronized (ANTARA) 130 MG capsule, TAKE 1 CAPSULE (130 MG TOTAL) BY MOUTH DAILY WITH BREAKFAST, Disp: 90 capsule, Rfl: 1    FLUoxetine (PROzac) 20 mg capsule, Take 1 capsule (20 mg total) by mouth daily, Disp: 90 capsule, Rfl: 1    guaiFENesin (MUCINEX) 600 mg 12 hr tablet, Take 2 tablets (1,200 mg total) by mouth every 12 (twelve) hours, Disp: 60 tablet, Rfl: 5    levothyroxine 50 mcg tablet, TAKE 1 TABLET (50 MCG TOTAL) BY MOUTH DAILY, Disp: 90 tablet, Rfl: 1    Magnesium 100 MG TABS, Take by mouth in the morning, Disp: , Rfl:     Multiple Vitamin (MULTI-VITAMIN DAILY PO), once daily, Disp: , Rfl:     Omega-3 1000 MG CAPS, Take by  "mouth in the morning, Disp: , Rfl:     Potassium Gluconate 80 MG TABS, Take by mouth in the morning, Disp: , Rfl:     tiotropium (Spiriva Respimat) 2.5 MCG/ACT AERS inhaler, Inhale 2 puffs daily, Disp: 4 g, Rfl: 5    metoprolol succinate (Toprol XL) 100 mg 24 hr tablet, Take 1 tablet (100 mg total) by mouth daily, Disp: 30 tablet, Rfl: 5    polyethylene glycol (Golytely) 4000 mL solution, Take 4,000 mL by mouth once for 1 dose Take 4000 mL by mouth once for 1 dose. Use as directed, Disp: 4000 mL, Rfl: 0    SUMAtriptan (IMITREX) 6 mg/0.5 mL, Inject 0.5 mL (6 mg total) under the skin once for 1 dose, Disp: 0.5 mL, Rfl: 1    Review of Systems  Constitutional :no fever, feels well, no tiredness, no recent weight gain or loss  ENT: no ear ache, no loss of hearing, no nosebleeds or nasal discharge, no sore throat or hoarseness.  Cardiovascular: no complaints of slow or fast heart beat, no chest pain, no palpitations, no leg claudication or lower extremity edema.  Respiratory: no complaints of shortness of shortness of breath, no HUTCHISON  Breasts:no complaints of breast pain, breast lump, or nipple discharge  Gastrointestinal: no complaints of abdominal pain, constipation, nausea, vomiting, or diarrhea or bloody stools  Genitourinary : no complaints of dysuria, incontinence, pelvic pain, no dysmenorrhea, vaginal discharge or abnormal vaginal bleeding and as noted in HPI.  Musculoskeletal: no complaints of arthralgia, no myalgia, no joint swelling or stiffness, no limb pain or swelling.  Integumentary: no complaints of skin rash or lesion, itching or dry skin  Neurological: no complaints of headache, no confusion, no numbness or tingling, no dizziness or fainting    Objective      /80   Ht 5' 1\" (1.549 m)   Wt 71.6 kg (157 lb 12.8 oz)   BMI 29.82 kg/m²   General:   appears stated age, cooperative, alert normal mood and affect   Lungs: Unlabored     Breasts: normal appearance, no masses or tenderness, Inspection " negative, No nipple retraction or dimpling, No nipple discharge or bleeding, No axillary or supraclavicular adenopathy, Normal to palpation without dominant masses   Abdomen: soft, non-tender, vertical skin incision with hernia  present  non tender      Vulva: normal, normal female genitalia, Bartholin's, Urethra, [place] normal, no lesions, normal female hair distribution, no clitoral enlargement   Vagina: vagina positive for atrophic mucosa   Urethra: normal   Cervix: Absent.   Uterus: uterus absent   Adnexa: no mass, fullness, tenderness   Lymphatic palpation of lymph nodes in neck, axilla, groin and/or other locations: no lymphadenopathy or masses noted   Skin normal skin turgor and no rashes.   Psychiatric orientation to person, place, and time: normal. mood and affect: normal

## 2024-06-17 ENCOUNTER — OFFICE VISIT (OUTPATIENT)
Dept: INTERNAL MEDICINE CLINIC | Facility: CLINIC | Age: 74
End: 2024-06-17
Payer: MEDICARE

## 2024-06-17 VITALS
HEART RATE: 63 BPM | HEIGHT: 61 IN | TEMPERATURE: 97.1 F | OXYGEN SATURATION: 99 % | DIASTOLIC BLOOD PRESSURE: 82 MMHG | SYSTOLIC BLOOD PRESSURE: 138 MMHG | BODY MASS INDEX: 30.44 KG/M2 | WEIGHT: 161.25 LBS

## 2024-06-17 DIAGNOSIS — J45.40 MODERATE PERSISTENT ASTHMA WITHOUT COMPLICATION: ICD-10-CM

## 2024-06-17 DIAGNOSIS — R19.05 PERIUMBILICAL MASS: Primary | ICD-10-CM

## 2024-06-17 PROCEDURE — 99214 OFFICE O/P EST MOD 30 MIN: CPT | Performed by: INTERNAL MEDICINE

## 2024-06-17 PROCEDURE — G2211 COMPLEX E/M VISIT ADD ON: HCPCS | Performed by: INTERNAL MEDICINE

## 2024-06-17 NOTE — PROGRESS NOTES
Ambulatory Visit  Name: Shell Mccann      : 1950      MRN: [id number]  Encounter Provider: Abhishek Martinez DO  Encounter Date: 2024   Encounter department: [facility]    Assessment & Plan   1. Periumbilical mass  Comments:  Mass in the area of the umbilicus  US of the abdomen  Reviewed the prior labs at length  Orders:  -     US abdomen complete; Future; Expected date: 2024  2. Moderate persistent asthma without complication  Assessment & Plan:  Doing well with Symbicort 160/4.5 mg (2) Puffs BID  Albuterol MDI PRN (rescue)  Encourage exercise/walking program       History of Present Illness     Concern over recent weight gain.  The patient states that she recently had a rectal prolapse.  There were complications during the repair and the patient states that she has there is a incision hernia.  She was told not to exercise and has noted a marked increase of weight since then.  Reviewed recent labs done by Ellen Ag.        Review of Systems   Constitutional:  Negative for chills, fatigue and fever.   HENT: Negative.     Respiratory:  Negative for cough, chest tightness and shortness of breath.    Cardiovascular:  Negative for chest pain, palpitations and leg swelling.   Gastrointestinal:  Negative for abdominal pain, constipation, diarrhea, nausea and vomiting.   Genitourinary: Negative.    Musculoskeletal:  Negative for arthralgias, back pain and myalgias.   Skin: Negative.    Neurological: Negative.    Psychiatric/Behavioral: Negative.       Current Outpatient Medications on File Prior to Visit   Medication Sig Dispense Refill   • albuterol (Ventolin HFA) 90 mcg/act inhaler Inhale 2 puffs every 4 (four) hours as needed for wheezing 18 g 0   • budesonide-formoterol (Symbicort) 160-4.5 mcg/act inhaler Inhale 2 puffs 2 (two) times a day Rinse mouth after use. 10.2 g 5   • buPROPion (WELLBUTRIN XL) 300 mg 24 hr tablet Take 1 tablet (300 mg total) by mouth every morning  "30 tablet 5   • butalbital-acetaminophen-caffeine (FIORICET,ESGIC) -40 mg per tablet Take 1 tablet by mouth every 6 (six) hours as needed for headaches 60 tablet 1   • Cholecalciferol (VITAMIN D PO) Take 5,000 Units by mouth in the morning     • Coenzyme Q10 (COQ10) 100 MG CAPS Take by mouth in the morning     • ezetimibe-simvastatin (VYTORIN) 10-40 mg per tablet TAKE 1 TABLET BY MOUTH DAILY AT BEDTIME 90 tablet 1   • fenofibrate micronized (ANTARA) 130 MG capsule TAKE 1 CAPSULE (130 MG TOTAL) BY MOUTH DAILY WITH BREAKFAST 90 capsule 1   • FLUoxetine (PROzac) 20 mg capsule Take 1 capsule (20 mg total) by mouth daily 90 capsule 1   • guaiFENesin (MUCINEX) 600 mg 12 hr tablet Take 2 tablets (1,200 mg total) by mouth every 12 (twelve) hours 60 tablet 5   • levothyroxine 50 mcg tablet TAKE 1 TABLET (50 MCG TOTAL) BY MOUTH DAILY 90 tablet 1   • Magnesium 100 MG TABS Take by mouth in the morning     • metoprolol succinate (Toprol XL) 100 mg 24 hr tablet Take 1 tablet (100 mg total) by mouth daily 30 tablet 5   • Multiple Vitamin (MULTI-VITAMIN DAILY PO) once daily     • Omega-3 1000 MG CAPS Take by mouth in the morning     • Potassium Gluconate 80 MG TABS Take by mouth in the morning     • SUMAtriptan (IMITREX) 6 mg/0.5 mL Inject 0.5 mL (6 mg total) under the skin once for 1 dose 0.5 mL 1   • tiotropium (Spiriva Respimat) 2.5 MCG/ACT AERS inhaler Inhale 2 puffs daily 4 g 5   • polyethylene glycol (Golytely) 4000 mL solution Take 4,000 mL by mouth once for 1 dose Take 4000 mL by mouth once for 1 dose. Use as directed 4000 mL 0     No current facility-administered medications on file prior to visit.      Objective     /82   Pulse 63   Temp (!) 97.1 °F (36.2 °C)   Ht 5' 1\" (1.549 m)   Wt 73.1 kg (161 lb 4 oz)   SpO2 99%   BMI 30.47 kg/m²     Physical Exam  Vitals and nursing note reviewed.   Constitutional:       General: She is not in acute distress.     Appearance: She is well-developed.   HENT:      " Head: Normocephalic and atraumatic.   Eyes:      Conjunctiva/sclera: Conjunctivae normal.   Cardiovascular:      Rate and Rhythm: Normal rate and regular rhythm.      Heart sounds: No murmur heard.  Pulmonary:      Effort: Pulmonary effort is normal. No respiratory distress.      Breath sounds: Normal breath sounds.   Abdominal:      Palpations: Abdomen is soft.      Tenderness: There is no abdominal tenderness.   Musculoskeletal:         General: No swelling.      Cervical back: Neck supple.   Skin:     General: Skin is warm and dry.      Capillary Refill: Capillary refill takes less than 2 seconds.   Neurological:      Mental Status: She is alert.   Psychiatric:         Mood and Affect: Mood normal.       Administrative Statements   I have spent a total time of 20 minutes on 06/17/24 In caring for this patient including Diagnostic results.

## 2024-06-17 NOTE — ASSESSMENT & PLAN NOTE
Doing well with Symbicort 160/4.5 mg (2) Puffs BID  Albuterol MDI PRN (rescue)  Encourage exercise/walking program

## 2024-06-21 DIAGNOSIS — J45.40 MODERATE PERSISTENT ASTHMA WITHOUT COMPLICATION: ICD-10-CM

## 2024-06-21 RX ORDER — BUDESONIDE AND FORMOTEROL FUMARATE DIHYDRATE 160; 4.5 UG/1; UG/1
2 AEROSOL RESPIRATORY (INHALATION) 2 TIMES DAILY
Qty: 10.2 G | Refills: 2 | Status: SHIPPED | OUTPATIENT
Start: 2024-06-21

## 2024-07-15 ENCOUNTER — HOSPITAL ENCOUNTER (OUTPATIENT)
Dept: ULTRASOUND IMAGING | Facility: HOSPITAL | Age: 74
Discharge: HOME | End: 2024-07-15
Payer: MEDICARE

## 2024-07-15 DIAGNOSIS — R19.05 PERIUMBILICAL MASS: ICD-10-CM

## 2024-07-15 PROCEDURE — 76705 ECHO EXAM OF ABDOMEN: CPT

## 2024-07-16 DIAGNOSIS — G43.809 OTHER MIGRAINE WITHOUT STATUS MIGRAINOSUS, NOT INTRACTABLE: ICD-10-CM

## 2024-07-16 RX ORDER — BUTALBITAL, ACETAMINOPHEN AND CAFFEINE 50; 325; 40 MG/1; MG/1; MG/1
1 TABLET ORAL EVERY 6 HOURS PRN
Qty: 60 TABLET | Refills: 1 | Status: SHIPPED | OUTPATIENT
Start: 2024-07-16

## 2024-07-17 ENCOUNTER — ANESTHESIA EVENT (OUTPATIENT)
Dept: GASTROENTEROLOGY | Facility: HOSPITAL | Age: 74
End: 2024-07-17

## 2024-07-17 ENCOUNTER — ANESTHESIA (OUTPATIENT)
Dept: GASTROENTEROLOGY | Facility: HOSPITAL | Age: 74
End: 2024-07-17

## 2024-07-17 ENCOUNTER — HOSPITAL ENCOUNTER (OUTPATIENT)
Dept: GASTROENTEROLOGY | Facility: HOSPITAL | Age: 74
Setting detail: OUTPATIENT SURGERY
Discharge: HOME | End: 2024-07-17
Admitting: INTERNAL MEDICINE
Payer: MEDICARE

## 2024-07-17 VITALS
RESPIRATION RATE: 18 BRPM | WEIGHT: 161 LBS | DIASTOLIC BLOOD PRESSURE: 61 MMHG | HEIGHT: 61 IN | TEMPERATURE: 98 F | BODY MASS INDEX: 30.4 KG/M2 | SYSTOLIC BLOOD PRESSURE: 122 MMHG | OXYGEN SATURATION: 95 % | HEART RATE: 92 BPM

## 2024-07-17 DIAGNOSIS — Z12.11 COLON CANCER SCREENING: ICD-10-CM

## 2024-07-17 DIAGNOSIS — Z86.010 HISTORY OF COLON POLYPS: ICD-10-CM

## 2024-07-17 DIAGNOSIS — G43.809 OTHER MIGRAINE WITHOUT STATUS MIGRAINOSUS, NOT INTRACTABLE: ICD-10-CM

## 2024-07-17 PROCEDURE — 45380 COLONOSCOPY AND BIOPSY: CPT | Performed by: INTERNAL MEDICINE

## 2024-07-17 PROCEDURE — 88305 TISSUE EXAM BY PATHOLOGIST: CPT | Performed by: PATHOLOGY

## 2024-07-17 RX ORDER — SODIUM CHLORIDE, SODIUM LACTATE, POTASSIUM CHLORIDE, CALCIUM CHLORIDE 600; 310; 30; 20 MG/100ML; MG/100ML; MG/100ML; MG/100ML
INJECTION, SOLUTION INTRAVENOUS CONTINUOUS PRN
Status: DISCONTINUED | OUTPATIENT
Start: 2024-07-17 | End: 2024-07-17

## 2024-07-17 RX ORDER — LIDOCAINE HYDROCHLORIDE 10 MG/ML
INJECTION, SOLUTION EPIDURAL; INFILTRATION; INTRACAUDAL; PERINEURAL AS NEEDED
Status: DISCONTINUED | OUTPATIENT
Start: 2024-07-17 | End: 2024-07-17

## 2024-07-17 RX ORDER — BUTALBITAL, ACETAMINOPHEN AND CAFFEINE 50; 325; 40 MG/1; MG/1; MG/1
1 TABLET ORAL EVERY 6 HOURS PRN
Qty: 60 TABLET | Refills: 1 | OUTPATIENT
Start: 2024-07-17

## 2024-07-17 RX ORDER — PROPOFOL 10 MG/ML
INJECTION, EMULSION INTRAVENOUS AS NEEDED
Status: DISCONTINUED | OUTPATIENT
Start: 2024-07-17 | End: 2024-07-17

## 2024-07-17 RX ADMIN — PROPOFOL 50 MG: 10 INJECTION, EMULSION INTRAVENOUS at 11:39

## 2024-07-17 RX ADMIN — PROPOFOL 40 MG: 10 INJECTION, EMULSION INTRAVENOUS at 11:28

## 2024-07-17 RX ADMIN — PROPOFOL 50 MG: 10 INJECTION, EMULSION INTRAVENOUS at 11:31

## 2024-07-17 RX ADMIN — LIDOCAINE HYDROCHLORIDE 50 MG: 10 INJECTION, SOLUTION EPIDURAL; INFILTRATION; INTRACAUDAL; PERINEURAL at 11:25

## 2024-07-17 RX ADMIN — SODIUM CHLORIDE, SODIUM LACTATE, POTASSIUM CHLORIDE, AND CALCIUM CHLORIDE: .6; .31; .03; .02 INJECTION, SOLUTION INTRAVENOUS at 11:21

## 2024-07-17 RX ADMIN — PROPOFOL 110 MG: 10 INJECTION, EMULSION INTRAVENOUS at 11:25

## 2024-07-17 NOTE — ANESTHESIA PREPROCEDURE EVALUATION
Procedure:  COLONOSCOPY    Relevant Problems   CARDIO   (+) Hypercholesterolemia   (+) Hypertension      NEURO/PSYCH   (+) Anxiety disorder      PULMONARY   (+) Moderate persistent asthma without complication        Physical Exam    Airway    Mallampati score: I  TM Distance: >3 FB  Neck ROM: full     Dental       Cardiovascular  Cardiovascular exam normal    Pulmonary  Pulmonary exam normal     Other Findings  post-pubertal.      Anesthesia Plan  ASA Score- 2     Anesthesia Type- IV sedation with anesthesia with ASA Monitors.         Additional Monitors:     Airway Plan:            Plan Factors-Exercise tolerance (METS): >4 METS.    Chart reviewed. EKG reviewed. Imaging results reviewed. Existing labs reviewed. Patient summary reviewed.                  Induction- intravenous.    Postoperative Plan- Plan for postoperative opioid use. Planned trial extubation        Informed Consent- Anesthetic plan and risks discussed with patient.  I personally reviewed this patient with the CRNA. Discussed and agreed on the Anesthesia Plan with the CRNA..

## 2024-07-17 NOTE — H&P
History and Physical - [facility]  Shell Mccann 73 y.o. female MRN: [id number]                  HPI: Shell Mccann is a 73 y.o. year old female who presents for colonoscopy      REVIEW OF SYSTEMS: Per the HPI, and otherwise unremarkable.    Historical Information   Past Medical History:   Diagnosis Date    Allergic     Seasonal    Arthritis ?    Asthma     Cellulitis 2024    Depression     Disease of thyroid gland     Diverticulitis of colon About 10 yrs ago    Severe    Headache(784.0) When I was 14 yrs old    Migraines    Hypercholesteremia     Migraine     Osteoporosis      Past Surgical History:   Procedure Laterality Date    COLON SURGERY      Prolapse rectim    FINGER FRACTURE SURGERY      FRACTURE SURGERY      Lower left plate,9 screws,2 pins    HYSTERECTOMY      LEG SURGERY      RECTAL PROLAPSE REPAIR      TOTAL ABDOMINAL HYSTERECTOMY W/ BILATERAL SALPINGOOPHORECTOMY       Social History   Social History     Substance and Sexual Activity   Alcohol Use Not Currently    Comment: social     Social History     Substance and Sexual Activity   Drug Use No     Social History     Tobacco Use   Smoking Status Former    Current packs/day: 0.00    Average packs/day: 0.3 packs/day for 15.0 years (3.8 ttl pk-yrs)    Types: Cigarettes    Start date: 2008    Quit date: 2023    Years since quittin.2    Passive exposure: Past   Smokeless Tobacco Never   Tobacco Comments    Started smoking at 17yrs. Stopped at 32yrs. Smoked for a few years later in life     Family History   Problem Relation Age of Onset    Vision loss Mother     Glaucoma Mother     Migraines Mother     Osteoporosis Mother     Cancer Father     Asthma Father         Legionaries disease, emphysema    COPD Father         Legionnaires disease    Hearing loss Father     Ovarian cancer Maternal Grandmother     Migraines Maternal Grandfather     No Known Problems Paternal Grandmother     No Known Problems  "Paternal Grandfather     No Known Problems Paternal Aunt     No Known Problems Paternal Aunt     BRCA2 Positive Neg Hx     BRCA2 Negative Neg Hx     BRCA1 Negative Neg Hx     BRCA1 Positive Neg Hx     BRCA 1/2 Neg Hx     Endometrial cancer Neg Hx     Colon cancer Neg Hx     Breast cancer additional onset Neg Hx     Breast cancer Neg Hx        Meds/Allergies       Current Outpatient Medications:     albuterol (Ventolin HFA) 90 mcg/act inhaler    budesonide-formoterol (SYMBICORT) 160-4.5 mcg/act inhaler    buPROPion (WELLBUTRIN XL) 300 mg 24 hr tablet    butalbital-acetaminophen-caffeine (FIORICET,ESGIC) -40 mg per tablet    Cholecalciferol (VITAMIN D PO)    Coenzyme Q10 (COQ10) 100 MG CAPS    ezetimibe-simvastatin (VYTORIN) 10-40 mg per tablet    fenofibrate micronized (ANTARA) 130 MG capsule    FLUoxetine (PROzac) 20 mg capsule    guaiFENesin (MUCINEX) 600 mg 12 hr tablet    levothyroxine 50 mcg tablet    Magnesium 100 MG TABS    Multiple Vitamin (MULTI-VITAMIN DAILY PO)    Omega-3 1000 MG CAPS    Potassium Gluconate 80 MG TABS    tiotropium (Spiriva Respimat) 2.5 MCG/ACT AERS inhaler    metoprolol succinate (Toprol XL) 100 mg 24 hr tablet    polyethylene glycol (Golytely) 4000 mL solution    SUMAtriptan (IMITREX) 6 mg/0.5 mL    Allergies   Allergen Reactions    Bee Venom     Latex     Pollen Extract Other (See Comments)     Seasonal allergies       Objective     BP (!) 174/83   Pulse 94   Temp 97.5 °F (36.4 °C) (Temporal)   Resp 18   Ht 5' 1\" (1.549 m)   Wt 73 kg (161 lb)   SpO2 91%   BMI 30.42 kg/m²       PHYSICAL EXAM    Gen: NAD  Head: NCAT  CV: RRR  CHEST: Clear  ABD: soft, NT/ND  EXT: no edema      ASSESSMENT/PLAN:  This is a 73 y.o. year old female here for colonoscopy, and she is stable and optimized for her procedure.       "

## 2024-07-17 NOTE — ANESTHESIA POSTPROCEDURE EVALUATION
Post-Op Assessment Note    CV Status:  Stable  Pain Score: 0    Pain management: adequate       Mental Status:  Alert and awake   Hydration Status:  Euvolemic   PONV Controlled:  Controlled   Airway Patency:  Patent     Post Op Vitals Reviewed: Yes    No anethesia notable event occurred.    Staff: CRNA               BP   128/61   Temp 97   Pulse 90   Resp 12   SpO2 98

## 2024-07-22 PROCEDURE — 88305 TISSUE EXAM BY PATHOLOGIST: CPT | Performed by: PATHOLOGY

## 2024-08-09 ENCOUNTER — OFFICE VISIT (OUTPATIENT)
Dept: INTERNAL MEDICINE CLINIC | Facility: CLINIC | Age: 74
End: 2024-08-09
Payer: MEDICARE

## 2024-08-09 VITALS
DIASTOLIC BLOOD PRESSURE: 84 MMHG | WEIGHT: 154 LBS | HEART RATE: 92 BPM | TEMPERATURE: 97.4 F | SYSTOLIC BLOOD PRESSURE: 126 MMHG | HEIGHT: 61 IN | OXYGEN SATURATION: 97 % | BODY MASS INDEX: 29.07 KG/M2

## 2024-08-09 DIAGNOSIS — J30.1 SEASONAL ALLERGIC RHINITIS DUE TO POLLEN: ICD-10-CM

## 2024-08-09 DIAGNOSIS — J45.40 MODERATE PERSISTENT ASTHMA WITHOUT COMPLICATION: ICD-10-CM

## 2024-08-09 DIAGNOSIS — E66.3 OVERWEIGHT (BMI 25.0-29.9): Primary | ICD-10-CM

## 2024-08-09 DIAGNOSIS — J32.1 CHRONIC FRONTAL SINUSITIS: ICD-10-CM

## 2024-08-09 DIAGNOSIS — F41.9 ANXIETY DISORDER, UNSPECIFIED TYPE: ICD-10-CM

## 2024-08-09 PROCEDURE — 99214 OFFICE O/P EST MOD 30 MIN: CPT | Performed by: INTERNAL MEDICINE

## 2024-08-09 PROCEDURE — G2211 COMPLEX E/M VISIT ADD ON: HCPCS | Performed by: INTERNAL MEDICINE

## 2024-08-09 RX ORDER — AMOXICILLIN AND CLAVULANATE POTASSIUM 875; 125 MG/1; MG/1
1 TABLET, FILM COATED ORAL EVERY 12 HOURS SCHEDULED
Qty: 14 TABLET | Refills: 0 | Status: SHIPPED | OUTPATIENT
Start: 2024-08-09 | End: 2024-08-16

## 2024-08-09 RX ORDER — LEVOCETIRIZINE DIHYDROCHLORIDE 5 MG/1
5 TABLET, FILM COATED ORAL EVERY EVENING
Qty: 30 TABLET | Refills: 5 | Status: SHIPPED | OUTPATIENT
Start: 2024-08-09 | End: 2025-02-05

## 2024-08-09 RX ORDER — FLUOXETINE 10 MG/1
10 CAPSULE ORAL DAILY
Qty: 30 CAPSULE | Refills: 0 | Status: SHIPPED | OUTPATIENT
Start: 2024-08-09 | End: 2024-09-08

## 2024-08-09 RX ORDER — SEMAGLUTIDE 0.5 MG/.5ML
INJECTION, SOLUTION SUBCUTANEOUS
Qty: 2 ML | Refills: 0 | Status: SHIPPED | OUTPATIENT
Start: 2024-08-09 | End: 2024-09-06

## 2024-08-09 NOTE — ASSESSMENT & PLAN NOTE
The patient was seen and examined and noted to have issues with mild asthma and is doing well with the combination of:  Spiriva 18 mcg Daily  Symbicort 160/4.5 mg (2) BID  Albuterol MDI PRN

## 2024-08-09 NOTE — ASSESSMENT & PLAN NOTE
The patient would like to decrease the dose of Fluoxetine and we will reduce the dose to 10 mg and see how she does.  If no return of anxiety then we will stop the medication  Consider titration of the Wellbutrin XL to off

## 2024-08-09 NOTE — PROGRESS NOTES
Ambulatory Visit  Name: Shell Mccann      : 1950      MRN: [id number]  Encounter Provider: Abhishek Martinez DO  Encounter Date: 2024   Encounter department: [facility]    Assessment & Plan   1. Overweight (BMI 25.0-29.9)  Assessment & Plan:  Increase the dose of the Semiglutide to 0.5 mg QWeek  Orders:  -     Semaglutide-Weight Management (Wegovy) 0.5 MG/0.5ML; Inject 0.5 mg under the skin weekly  2. Chronic frontal sinusitis  Comments:  Augmenting 875/125 mg BID  Orders:  -     levocetirizine (XYZAL) 5 MG tablet; Take 1 tablet (5 mg total) by mouth every evening  -     amoxicillin-clavulanate (AUGMENTIN) 875-125 mg per tablet; Take 1 tablet by mouth every 12 (twelve) hours for 7 days  3. Seasonal allergic rhinitis due to pollen  Assessment & Plan:  Add Xyzal 5 mg Qday  4. Anxiety disorder, unspecified type  Comments:  moderate per KINJAL-7   start medication and f/u accordingly  Assessment & Plan:  The patient would like to decrease the dose of Fluoxetine and we will reduce the dose to 10 mg and see how she does.  If no return of anxiety then we will stop the medication  Consider titration of the Wellbutrin XL to off  Orders:  -     FLUoxetine (PROzac) 10 mg capsule; Take 1 capsule (10 mg total) by mouth daily  5. Moderate persistent asthma without complication  Assessment & Plan:  The patient was seen and examined and noted to have issues with mild asthma and is doing well with the combination of:  Spiriva 18 mcg Daily  Symbicort 160/4.5 mg (2) BID  Albuterol MDI PRN       History of Present Illness     The patient was seen and examined and noted to have issues with PND and notes this has been present for several weeks.  The patient notes that she is interested in trying the increased dose of Semaglutide 0.5.  Seh has lost 7 lbs.        Review of Systems   Constitutional:  Negative for chills, fatigue and fever.   HENT:  Positive for rhinorrhea.    Respiratory:  Positive for  "cough. Negative for chest tightness and shortness of breath.    Cardiovascular:  Negative for chest pain, palpitations and leg swelling.   Gastrointestinal:  Negative for abdominal pain, constipation, diarrhea, nausea and vomiting.   Genitourinary: Negative.    Musculoskeletal:  Negative for arthralgias, back pain and myalgias.   Skin: Negative.    Neurological: Negative.    Psychiatric/Behavioral: Negative.         Objective     /84 (BP Location: Left arm, Patient Position: Sitting, Cuff Size: Adult)   Pulse 92   Temp (!) 97.4 °F (36.3 °C) (Tympanic)   Ht 5' 1\" (1.549 m)   Wt 69.9 kg (154 lb)   SpO2 97%   BMI 29.10 kg/m²     Physical Exam  Vitals and nursing note reviewed.   Constitutional:       General: She is not in acute distress.     Appearance: She is well-developed.   HENT:      Head: Normocephalic and atraumatic.      Nose: Congestion present.   Eyes:      Conjunctiva/sclera: Conjunctivae normal.   Cardiovascular:      Rate and Rhythm: Normal rate and regular rhythm.      Heart sounds: No murmur heard.  Pulmonary:      Effort: Pulmonary effort is normal. No respiratory distress.      Breath sounds: Normal breath sounds.   Abdominal:      Palpations: Abdomen is soft.      Tenderness: There is no abdominal tenderness.   Musculoskeletal:         General: No swelling.      Cervical back: Neck supple.   Skin:     General: Skin is warm and dry.      Capillary Refill: Capillary refill takes less than 2 seconds.   Neurological:      Mental Status: She is alert.   Psychiatric:         Mood and Affect: Mood normal.       Administrative Statements           "

## 2024-08-14 ENCOUNTER — OFFICE VISIT (OUTPATIENT)
Age: 74
End: 2024-08-14
Payer: MEDICARE

## 2024-08-14 VITALS
TEMPERATURE: 98.1 F | HEART RATE: 89 BPM | OXYGEN SATURATION: 95 % | SYSTOLIC BLOOD PRESSURE: 134 MMHG | DIASTOLIC BLOOD PRESSURE: 82 MMHG

## 2024-08-14 DIAGNOSIS — Z86.010 HISTORY OF COLON POLYPS: ICD-10-CM

## 2024-08-14 DIAGNOSIS — K59.04 CHRONIC IDIOPATHIC CONSTIPATION: Primary | ICD-10-CM

## 2024-08-14 PROCEDURE — 99213 OFFICE O/P EST LOW 20 MIN: CPT | Performed by: NURSE PRACTITIONER

## 2024-08-14 NOTE — PROGRESS NOTES
[facility] - Outpatient Follow-up Note  Shell Mccann 73 y.o. female MRN: [id number]  Encounter: [id number]          ASSESSMENT AND PLAN:    The patient presents today for follow-up office visit regarding her chronic idiopathic constipation and history of colon polyps.    Exam: Abdomen is soft, nondistended, nontender, with hypoactive bowel sounds x 4. No edema noted of the b/l lower extremities upon exam today. Skin is non-icteric.      1. Chronic idiopathic constipation  Assessment & Plan:  Improved/Stable  Continue Metamucil daily.  Continue to work on drinking as close as possible to 64 ounces of water daily.  Can use MiraLAX as needed for constipation.  2. History of colon polyps  Assessment & Plan:  Since the patient is status post a colonoscopy, but is currently not showing any red flag symptoms, we will proceed with a repeat colonoscopy as recommended unless they would start to develop red flag symptoms in the future: DUE: 7/17/31    Reviewed GI red flag symptoms with patient today.      The patient will schedule a follow up office visit in 7 years, but understands to call or contact our office if there are any issues or concerns in the mean time.  ______________________________________________________________________    SUBJECTIVE: Patient is a 73 y.o. female who was previously seen in our office on 6/6/24 and presents today for follow-up office visit regarding her chronic idiopathic constipation and history of colon polyps.  Since her last office visit she did proceed with the repeat colonoscopy as discussed on July 17, 2024 with Dr. Aguilar, with 1 benign polyp removed with a recommendation of a repeat colonoscopy in 7 years.    The patient reports that since her last office visit she has continued to work on drinking more water daily and dietary fiber supplementation and feels that her constipation has definitely improved and is stable.    The patient  denies any reflux, nausea, dysphagia, vomiting, decreased appetite, unplanned weight loss, or abdominal pain. Water Intake: 40 to 64 ounces per day.    The patient reports that they have a BM daily and reports that it is more relieving, without any consistent diarrhea, nocturnal BMs, constipation, straining, melena or bloody stools. Last BM: This morning. Flatus: Yes.    Meds: Metameucil daily.  Daily NSAID Use: Denied.    Imaging: (None):     Endoscopy History: EGD: (None):     COLONOSCOPY: (24): 1 benign polyp removed with a recommendation of a repeat colonoscopy in 7 years.    DUE: 31    REVIEW OF SYSTEMS IS OTHERWISE NEGATIVE.      Historical Information   Past Medical History:   Diagnosis Date    Allergic     Seasonal    Arthritis ?    Asthma     Cellulitis 2024    Depression     Disease of thyroid gland     Diverticulitis of colon About 10 yrs ago    Severe    Headache(784.0) When I was 14 yrs old    Migraines    Hypercholesteremia     Migraine     Osteoporosis      Past Surgical History:   Procedure Laterality Date    COLON SURGERY      Prolapse rectim    FINGER FRACTURE SURGERY      FRACTURE SURGERY      Lower left plate,9 screws,2 pins    HYSTERECTOMY      LEG SURGERY      RECTAL PROLAPSE REPAIR      TOTAL ABDOMINAL HYSTERECTOMY W/ BILATERAL SALPINGOOPHORECTOMY       Social History   Social History     Substance and Sexual Activity   Alcohol Use Not Currently    Comment: social     Social History     Substance and Sexual Activity   Drug Use No     Social History     Tobacco Use   Smoking Status Former    Current packs/day: 0.00    Average packs/day: 0.3 packs/day for 15.0 years (3.8 ttl pk-yrs)    Types: Cigarettes    Start date: 2008    Quit date: 2023    Years since quittin.2    Passive exposure: Past   Smokeless Tobacco Never   Tobacco Comments    Started smoking at 17yrs. Stopped at 32yrs. Smoked for a few years later in life     Family History   Problem Relation Age  of Onset    Vision loss Mother     Glaucoma Mother     Migraines Mother     Osteoporosis Mother     Cancer Father     Asthma Father         Legionaries disease    COPD Father         Legionnaires disease    Hearing loss Father     Ovarian cancer Maternal Grandmother     Migraines Maternal Grandfather     No Known Problems Paternal Grandmother     No Known Problems Paternal Grandfather     No Known Problems Paternal Aunt     No Known Problems Paternal Aunt     BRCA2 Positive Neg Hx     BRCA2 Negative Neg Hx     BRCA1 Negative Neg Hx     BRCA1 Positive Neg Hx     BRCA 1/2 Neg Hx     Endometrial cancer Neg Hx     Colon cancer Neg Hx     Breast cancer additional onset Neg Hx     Breast cancer Neg Hx        Meds/Allergies       Current Outpatient Medications:     albuterol (Ventolin HFA) 90 mcg/act inhaler    amoxicillin-clavulanate (AUGMENTIN) 875-125 mg per tablet    budesonide-formoterol (SYMBICORT) 160-4.5 mcg/act inhaler    buPROPion (WELLBUTRIN XL) 300 mg 24 hr tablet    butalbital-acetaminophen-caffeine (FIORICET,ESGIC) -40 mg per tablet    Cholecalciferol (VITAMIN D PO)    Coenzyme Q10 (COQ10) 100 MG CAPS    ezetimibe-simvastatin (VYTORIN) 10-40 mg per tablet    fenofibrate micronized (ANTARA) 130 MG capsule    FLUoxetine (PROzac) 10 mg capsule    guaiFENesin (MUCINEX) 600 mg 12 hr tablet    levocetirizine (XYZAL) 5 MG tablet    levothyroxine 50 mcg tablet    Magnesium 100 MG TABS    metoprolol succinate (Toprol XL) 100 mg 24 hr tablet    Multiple Vitamin (MULTI-VITAMIN DAILY PO)    Omega-3 1000 MG CAPS    Potassium Gluconate 80 MG TABS    Semaglutide-Weight Management (Wegovy) 0.5 MG/0.5ML    SUMAtriptan (IMITREX) 6 mg/0.5 mL    tiotropium (Spiriva Respimat) 2.5 MCG/ACT AERS inhaler    Allergies   Allergen Reactions    Bee Venom     Latex     Pollen Extract Other (See Comments)     Seasonal allergies           Objective     not currently breastfeeding. There is no height or weight on file to calculate  BMI.      PHYSICAL EXAM:      General Appearance:   Alert, cooperative, no distress   HEENT:   Normocephalic, atraumatic, anicteric.     Neck:  Supple, symmetrical, trachea midline   Lungs:   Clear to auscultation bilaterally; no rales, rhonchi or wheezing; respirations unlabored    Heart::   Regular rate and rhythm; no murmur, rub, or gallop.   Abdomen:   Soft, non-tender, non-distended; normal bowel sounds; no masses, no organomegaly    Genitalia:   Deferred    Rectal:   Deferred    Extremities:  No cyanosis, clubbing or edema    Pulses:  2+ and symmetric    Skin:  No jaundice, rashes, or lesions    Lymph nodes:  No palpable cervical lymphadenopathy        Lab Results:   No visits with results within 1 Day(s) from this visit.   Latest known visit with results is:   Hospital Outpatient Visit on 07/17/2024   Component Date Value    Case Report 07/17/2024                      Value:Surgical Pathology Report                         Case: [id number]                                  Authorizing Provider:  Katelyn Aguilar DO    Collected:           07/17/2024 1141              Ordering Location:     [facility] Received:            07/17/2024 1416                                     Endoscopy                                                                    Pathologist:           Jayden Rose MD                                                         Specimen:    Large Intestine, Transverse Colon, polyp                                                   Final Diagnosis 07/17/2024                      Value:A.  Transverse colon, polyp:     - Tubular adenoma.     - No high grade dysplasia or carcinoma identified.        Additional Information 07/17/2024                      Value:All reported additional testing was performed with appropriately reactive controls.  These tests were developed and their performance characteristics determined by [facility] or appropriate performing  "facility, though some tests may be performed on tissues which have not been validated for performance characteristics (such as staining performed on alcohol exposed cell blocks and decalcified tissues).  Results should be interpreted with caution and in the context of the patients’ clinical condition. These tests may not be cleared or approved by the U.S. Food and Drug Administration, though the FDA has determined that such clearance or approval is not necessary. These tests are used for clinical purposes and they should not be regarded as investigational or for research. This laboratory has been approved by [place] , designated as a high-complexity laboratory and is qualified to perform these tests.  .  - Interpretation performed at [facility], [address] [place] [id number]      Synoptic Checklist 07/17/2024                      Value:                            COLON/RECTUM POLYP FORM - GI - All Specimens                                                                                     :    Adenoma(s)      Gross Description 07/17/2024                      Value:A. The specimen is received in formalin, labeled with the patient's name and hospital number, and is designated \" transverse colon polyp\".  The specimen consists of 1 tan polyp measuring 0.3 cm.  Entirely submitted. Screened cassette.    Note: The estimated total formalin fixation time based upon information provided by the submitting clinician and the standard processing schedule is under 72.0 hours.  RRavotti           Radiology Results:   Colonoscopy    Result Date: 7/17/2024  Narrative: Table formatting from the original result was not included. [facility] [address] [place] [id number] [phone] DATE OF SERVICE: 7/17/24 PHYSICIAN(S): Attending: Katelyn Aguilar DO Fellow: No Staff Documented INDICATION: Colon cancer screening, History of colon polyps POST-OP DIAGNOSIS: See " the impression below. HISTORY: Prior colonoscopy: 3 years ago. BOWEL PREPARATION: Miralax/Dulcolax PREPROCEDURE: Informed consent was obtained for the procedure, including sedation. Risks including but not limited to bleeding, infection, perforation, adverse drug reaction and aspiration were explained in detail. Also explained about less than 100% sensitivity with the exam and other alternatives. The patient was placed in the left lateral decubitus position. Procedure: Colonoscopy DETAILS OF PROCEDURE: Patient was taken to the procedure room where a time out was performed to confirm correct patient and correct procedure. The patient underwent monitored anesthesia care, which was administered by an anesthesia professional. The patient's blood pressure, ECG, ETCO2, heart rate, level of consciousness, oxygen and respirations were monitored throughout the procedure. A digital rectal exam was performed. The scope was introduced through the anus and advanced to the cecum. Retroflexion was performed in the rectum. The quality of bowel preparation was evaluated using the [place] Bowel Preparation Scale with scores of: right colon = 2, transverse colon = 2, left colon = 2. The total BBPS score was 6. Bowel prep was adequate. The patient experienced no blood loss. The procedure was not difficult. The patient tolerated the procedure well. There were no apparent adverse events. ANESTHESIA INFORMATION: ASA: II Anesthesia Type: IV Sedation with Anesthesia MEDICATIONS: No administrations occurring from 1111 to 1147 on 07/17/24 FINDINGS: One polyp in the transverse colon; performed cold forceps biopsy EVENTS: Procedure Events Event Event Time ENDO CECUM REACHED 7/17/2024 11:37 AM ENDO SCOPE OUT TIME 7/17/2024 11:45 AM SPECIMENS: ID Type Source Tests Collected by Time Destination 1 : polyp Tissue Large Intestine, Transverse Colon TISSUE EXAM Katelyn Aguilar DO 7/17/2024 11:41 AM  EQUIPMENT: Colonoscope - ENDOCUFF VISION LRG  GREEN ID [id number]     Impression: Polyp in the transverse colon was removed with cold forceps biopsy RECOMMENDATION: No further screening colonoscopies necessary Age greater than 65   Await pathology; recommendation for repeat colonoscopy may change based on this result Call GI office if you experience abdominal pain, fevers, or rectal bleeding  Katelyn Aguilar, DO     US superficial lump (non extremity)    Result Date: 7/16/2024  Narrative: ABDOMINAL WALL ULTRASOUND INDICATION: R19.05: Periumbilic swelling, mass or lump. COMPARISON: CT of the abdomen and pelvis 7/7/2023 TECHNIQUE: Real-time ultrasound of the superficial soft tissues of the anterior abdominal wall was performed.     Impression: No abnormality identified throughout the imaged region of concern. Specifically, no mass, fluid collection, or ventral abdominal wall hernia. Workstation performed: [id number]

## 2024-08-14 NOTE — PATIENT INSTRUCTIONS
Continue to work on drinking as close as possible to 64 oz of water daily.   Continue metamucil daily.   Continue to watch for red flag symptoms.   Schedule a f/u OV in 7 years or sooner if needed.     We did review GI red flag symptoms, including, but not limited to: chronic nausea, vomiting, diarrhea, chills, fever, and unintentional weight loss and should call or contact our office with any changes or concerns. I reviewed with the patient that if they notice any blood while vomiting or in their stool they should contact or office or go to the nearest emergency room for immediate evaluation. The patient was agreeable and verbalized an understanding.

## 2024-08-15 NOTE — ASSESSMENT & PLAN NOTE
Since the patient is status post a colonoscopy, but is currently not showing any red flag symptoms, we will proceed with a repeat colonoscopy as recommended unless they would start to develop red flag symptoms in the future: DUE: 7/17/31    Reviewed GI red flag symptoms with patient today.

## 2024-08-15 NOTE — ASSESSMENT & PLAN NOTE
Improved/Stable  Continue Metamucil daily.  Continue to work on drinking as close as possible to 64 ounces of water daily.  Can use MiraLAX as needed for constipation.

## 2024-09-04 ENCOUNTER — OFFICE VISIT (OUTPATIENT)
Dept: PULMONOLOGY | Facility: CLINIC | Age: 74
End: 2024-09-04
Payer: MEDICARE

## 2024-09-04 VITALS
HEART RATE: 88 BPM | SYSTOLIC BLOOD PRESSURE: 118 MMHG | HEIGHT: 61 IN | OXYGEN SATURATION: 96 % | BODY MASS INDEX: 29.64 KG/M2 | TEMPERATURE: 98 F | DIASTOLIC BLOOD PRESSURE: 84 MMHG | WEIGHT: 157 LBS

## 2024-09-04 DIAGNOSIS — J45.40 MODERATE PERSISTENT ASTHMA WITHOUT COMPLICATION: ICD-10-CM

## 2024-09-04 DIAGNOSIS — G47.19 EXCESSIVE DAYTIME SLEEPINESS: Primary | ICD-10-CM

## 2024-09-04 PROCEDURE — 99214 OFFICE O/P EST MOD 30 MIN: CPT | Performed by: INTERNAL MEDICINE

## 2024-09-04 RX ORDER — TIOTROPIUM BROMIDE INHALATION SPRAY 3.12 UG/1
2 SPRAY, METERED RESPIRATORY (INHALATION) DAILY
Qty: 4 G | Refills: 5 | Status: SHIPPED | OUTPATIENT
Start: 2024-09-04

## 2024-09-04 NOTE — PROGRESS NOTES
Pulmonary Follow Up Note   Shell Mccann 73 y.o. female MRN: [id number]  9/4/2024    Assessment:  Moderate persistent asthma  Well-controlled, ACT score 23/25  Last exacerbation/outpatient treatment 5/2024  No frequent use of PRN albuterol  Allergic type II asthma, highest eosinophilic count was 970 cells in 10/2023  Possible underlying chronic bronchitis from prior smoking history  Negative [place] allergy panel, IgE 930 cells    Plan:  Continue current therapy Spiriva Respimat/Symbicort 160  Given the list of ICS/LABA, will check with insurance which 1 is fully covered and we will order it  Given more samples of the Spiriva Respimat  Continue PRN albuterol  Continue avoiding allergens    Positive ELISABETH screen  Reported excessive daytime sleepiness, fatigue   reports infrequent night snoring  Pending home sleep study    Return in about 3 months (around 12/4/2024).    History of Present Illness     Follow up for: asthma     Background  73 y.o. female with a h/o anxiety/depression, hypothyroidism, dyslipidemia, osteoporosis, migraine, former tobacco abuse     Seen in the ED 10/26 for shortness of breath and wheezing.  Before that given Z-Mingo at the urgent care which did not help.  Treated with IV steroids/nebulized inhalers.  Noted elevated eosinophilic count at 970 cells.  Chest x-ray showed no acute changes.     Reports feeling cough, dyspnea, chest tightness and severe wheezing few weeks ago.  Possible trigger is exposure to black mold while cleaning at the shower at home.  Reports history of seasonal allergies for several years, usually around spring, fall and winter.  No prior formal diagnosis of asthma or COPD.  Smoked 15-pack-year quit in the 1980s, return to smoking few cigarettes per day for 30 years quit completely in 5/2023.     Since the ED visit, felt better on the steroid taper, also PRN albuterol with a spacer which he uses at least 2-4 times per day.  Also reports improvement of the  "nocturnal coughing spells/episodes.     \"Social/exposure history  About 15-pack-year tobacco abuse history quit in the 1980s  Smoked briefly few cigarettes per day for 3 years quit in 5/2023  Nonalcoholic  She is a retired teacher  Lives in an old house, has possible exposure to mold/black in the bathroom  Also there is mold in the basement however does not go there  Pets: 1 dog for 8 years\"     10/30/2023 visit-started Symbicort 160/suspected asthma.  PFT.  Nebulizer/supplies.     12/2023 visit-continue Symbicort, ACT score 23/25, switched nebulized albuterol to PRN instead of twice daily treated for exacerbation with a steroid burst.    Interval History  Since last seen, continued to feel well on triple inhalers.  No frequent use of PRN albuterol.  Except 1 time, was on the outside with her daughter/grandchildren in the heat, had to use PRN albuterol frequently due to chest tightness/and cough.  ACT score today is 23/25.  No ED visit, hospitalization, or treatment with oral steroids for asthma      Review of Systems  As per hpi, all other systems reviewed and were negative    Studies:     Imaging and other studies: I have personally reviewed pertinent films in PACS        Pulmonary function testing:   PFT 11/6/2023-ratio 74%, FEV1 1.48 L / 77% FVC 2.00 L / 81% TLC 86%, RV 94%, DLCO 68%      EKG, Pathology, and Other Studies: I have personally reviewed pertinent reports.           Past medical, surgical, social and family histories reviewed.      Medications/Allergies: Reviewed      Vitals: Blood pressure 118/84, pulse 88, temperature 98 °F (36.7 °C), temperature source Tympanic, height 5' 1\" (1.549 m), weight 71.2 kg (157 lb), SpO2 96%, not currently breastfeeding. Body mass index is 29.66 kg/m². Oxygen Therapy  SpO2: 96 %  Oxygen Therapy: None (Room air)      Physical Exam  Body mass index is 29.66 kg/m².   Gen: not in acute distress,   Neck/Eyes: supple, no adenopathy, PERRL  Ear: normal appearance, no " "significant hearing impairment  Nose:  normal nasal mucosa, no drainage  Mouth:  unremarkable/normal appearance of lips, teeth and gums  Oropharynx: mucosa is moist, no focal lesions or erythema  Salivary glands: soft nontender  Chest: normal respiratory efforts, clear breath sounds bilaterally  CV: RRR, no murmurs appreciated, no edema  Abdomen: soft, non tender  Extremities:  No observed deformity   Skin: unremarkable  Neuro: AAO X3, no focal motor deficit        Labs:  Lab Results   Component Value Date    WBC 7.78 04/30/2024    HGB 14.7 04/30/2024    HCT 47.6 (H) 04/30/2024    MCV 95 04/30/2024     04/30/2024     Lab Results   Component Value Date    CALCIUM 10.0 04/30/2024    K 4.4 04/30/2024    CO2 32 04/30/2024     04/30/2024    BUN 18 04/30/2024    CREATININE 0.65 04/30/2024     Lab Results   Component Value Date     (H) 05/03/2024     Lab Results   Component Value Date    ALT 18 04/30/2024    AST 19 04/30/2024    ALKPHOS 50 04/30/2024           Portions of the record may have been created with voice recognition software.  Occasional wrong word or \"sound a like\" substitutions may have occurred due to the inherent limitations of voice recognition software.  Read the chart carefully and recognize, using context, where substitutions have occurred    Feng Portillo M.D.  [facility]  "

## 2024-10-15 ENCOUNTER — HOSPITAL ENCOUNTER (OUTPATIENT)
Dept: BONE DENSITY | Facility: HOSPITAL | Age: 74
Discharge: HOME | End: 2024-10-15
Attending: STUDENT IN AN ORGANIZED HEALTH CARE EDUCATION/TRAINING PROGRAM
Payer: MEDICARE

## 2024-10-15 DIAGNOSIS — M81.0 OSTEOPOROSIS, UNSPECIFIED OSTEOPOROSIS TYPE, UNSPECIFIED PATHOLOGICAL FRACTURE PRESENCE: ICD-10-CM

## 2024-10-15 PROCEDURE — 77080 DXA BONE DENSITY AXIAL: CPT

## 2024-10-25 ENCOUNTER — HOSPITAL ENCOUNTER (OUTPATIENT)
Dept: SLEEP CENTER | Facility: HOSPITAL | Age: 74
Discharge: HOME | End: 2024-10-25
Attending: INTERNAL MEDICINE
Payer: MEDICARE

## 2024-10-25 DIAGNOSIS — G47.19 EXCESSIVE DAYTIME SLEEPINESS: ICD-10-CM

## 2024-10-25 PROCEDURE — G0399 HOME SLEEP TEST/TYPE 3 PORTA: HCPCS

## 2024-10-25 NOTE — PROGRESS NOTES
Home Sleep Study Documentation    HOME STUDY DEVICE: Noxturnal no                                           Jeannette G3 yes device # 4      Pre-Sleep Home Study:    Set-up and instructions performed by: Patrica    Technician performed demonstration for Patient: yes    Return demonstration performed by Patient: yes    Written instructions provided to Patient: yes    Patient signed consent form: yes        Post-Sleep Home Study:    Additional comments by Patient: None    Home Sleep Study Failed:no:    Failure reason: N/A    Reported or Detected: N/A    Scored by: CLARA Mosquera

## 2024-10-29 PROBLEM — G47.33 OSA (OBSTRUCTIVE SLEEP APNEA): Status: ACTIVE | Noted: 2024-10-29

## 2024-10-30 ENCOUNTER — OFFICE VISIT (OUTPATIENT)
Dept: ENDOCRINOLOGY | Facility: CLINIC | Age: 74
End: 2024-10-30
Payer: MEDICARE

## 2024-10-30 VITALS
HEIGHT: 61 IN | WEIGHT: 152.4 LBS | SYSTOLIC BLOOD PRESSURE: 124 MMHG | BODY MASS INDEX: 28.77 KG/M2 | DIASTOLIC BLOOD PRESSURE: 74 MMHG | TEMPERATURE: 97.3 F | HEART RATE: 83 BPM

## 2024-10-30 DIAGNOSIS — Z79.83 LONG TERM USE OF BISPHOSPHONATES: ICD-10-CM

## 2024-10-30 DIAGNOSIS — M81.0 OSTEOPOROSIS, UNSPECIFIED OSTEOPOROSIS TYPE, UNSPECIFIED PATHOLOGICAL FRACTURE PRESENCE: Primary | ICD-10-CM

## 2024-10-30 DIAGNOSIS — Z87.19 HISTORY OF DENTAL PROBLEMS: ICD-10-CM

## 2024-10-30 PROCEDURE — G2211 COMPLEX E/M VISIT ADD ON: HCPCS | Performed by: STUDENT IN AN ORGANIZED HEALTH CARE EDUCATION/TRAINING PROGRAM

## 2024-10-30 PROCEDURE — 99214 OFFICE O/P EST MOD 30 MIN: CPT | Performed by: STUDENT IN AN ORGANIZED HEALTH CARE EDUCATION/TRAINING PROGRAM

## 2024-10-30 RX ORDER — TERIPARATIDE 250 UG/ML
20 INJECTION, SOLUTION SUBCUTANEOUS DAILY
Qty: 7.2 ML | Refills: 3 | Status: SHIPPED | OUTPATIENT
Start: 2024-10-30 | End: 2025-10-25

## 2024-10-30 RX ORDER — PEN NEEDLE, DIABETIC 32GX 5/32"
NEEDLE, DISPOSABLE MISCELLANEOUS
Qty: 100 EACH | Refills: 3 | Status: SHIPPED | OUTPATIENT
Start: 2024-10-30

## 2024-10-30 NOTE — PROGRESS NOTES
Ambulatory Visit  Name: Shell Mccann      : 1950      MRN: [id number]  Encounter Provider: Pete Madrigal DO  Encounter Date: 10/30/2024   Encounter department: [facility]    Assessment & Plan  Osteoporosis, unspecified osteoporosis type, unspecified pathological fracture presence  Very high risk with low T-score. There is history of prolonged tx with boniva >20y, off for past 2 years. Pt previously considered for reclast, but declined d/t concerns raised by periodontist. Pt no longer in need of dental procedures. BMD shows declines in hip BMD. I would like to pursue anabolic therapy with PTH analog. Pro/cons reviewed. Consider 2y course forteo, f/b reclast for preservation of BMD gains. Pt in agreement in plan. Will Rx and follow up in 6-mo       Long term use of bisphosphonates         History of dental problems         RTC 6-mo    History of Present Illness     Shell Mccann is a 73 y.o. female who presents for osteoporosis. She is here today to review DXA study. She has not had any significant changes in health since last visit. She has not had fractures. She contineus to optimize calcium and vit D intake. There is prior hx boniva use for est 20 y. She is s/p dental procedure with implants, which took without issue. She had been deterred to start osteoporotic therapy by her periodontist. She is not expected to have any additional therapy. She remains concerned about her low bone density.     History obtained from : patient and patient's Significant Other  Review of Systems  Medical History Reviewed by provider this encounter:  Meds       Current Outpatient Medications on File Prior to Visit   Medication Sig Dispense Refill    albuterol (Ventolin HFA) 90 mcg/act inhaler Inhale 2 puffs every 4 (four) hours as needed for wheezing 18 g 0    budesonide-formoterol (SYMBICORT) 160-4.5 mcg/act inhaler INHALE 2 PUFFS 2 (TWO) TIMES A DAY RINSE MOUTH  AFTER USE. 10.2 g 2    buPROPion (WELLBUTRIN XL) 300 mg 24 hr tablet Take 1 tablet (300 mg total) by mouth every morning 30 tablet 5    butalbital-acetaminophen-caffeine (FIORICET,ESGIC) -40 mg per tablet TAKE 1 TABLET BY MOUTH EVERY 6 (SIX) HOURS AS NEEDED FOR HEADACHES 60 tablet 1    Cholecalciferol (VITAMIN D PO) Take 5,000 Units by mouth in the morning      Coenzyme Q10 (COQ10) 100 MG CAPS Take by mouth in the morning      ezetimibe-simvastatin (VYTORIN) 10-40 mg per tablet TAKE 1 TABLET BY MOUTH DAILY AT BEDTIME 90 tablet 1    fenofibrate micronized (ANTARA) 130 MG capsule TAKE 1 CAPSULE (130 MG TOTAL) BY MOUTH DAILY WITH BREAKFAST 90 capsule 1    FLUoxetine (PROzac) 10 mg capsule Take 1 capsule (10 mg total) by mouth daily 30 capsule 0    guaiFENesin (MUCINEX) 600 mg 12 hr tablet Take 2 tablets (1,200 mg total) by mouth every 12 (twelve) hours 60 tablet 5    levocetirizine (XYZAL) 5 MG tablet Take 1 tablet (5 mg total) by mouth every evening 30 tablet 5    levothyroxine 50 mcg tablet TAKE 1 TABLET (50 MCG TOTAL) BY MOUTH DAILY 90 tablet 1    Magnesium 100 MG TABS Take by mouth in the morning      metoprolol succinate (Toprol XL) 100 mg 24 hr tablet Take 1 tablet (100 mg total) by mouth daily 30 tablet 5    Multiple Vitamin (MULTI-VITAMIN DAILY PO) once daily      Omega-3 1000 MG CAPS Take by mouth in the morning      Potassium Gluconate 80 MG TABS Take by mouth in the morning      SUMAtriptan (IMITREX) 6 mg/0.5 mL Inject 0.5 mL (6 mg total) under the skin once for 1 dose 0.5 mL 1    tiotropium (Spiriva Respimat) 2.5 MCG/ACT AERS inhaler Inhale 2 puffs daily 4 g 5     No current facility-administered medications on file prior to visit.      Social History     Tobacco Use    Smoking status: Former     Current packs/day: 0.00     Average packs/day: 0.3 packs/day for 15.0 years (3.8 ttl pk-yrs)     Types: Cigarettes     Start date: 2008     Quit date: 2023     Years since quittin.5     Passive  "exposure: Past    Smokeless tobacco: Never    Tobacco comments:     Started smoking at 17yrs. Stopped at 32yrs. Smoked for a few years later in life   Vaping Use    Vaping status: Never Used   Substance and Sexual Activity    Alcohol use: Not Currently     Comment: social    Drug use: No    Sexual activity: Not Currently     Partners: Male     Birth control/protection: None         Objective     Temp (!) 97.3 °F (36.3 °C)   Ht 5' 1\" (1.549 m)   Wt 69.1 kg (152 lb 6.4 oz)   BMI 28.80 kg/m²     Physical Exam  Vitals reviewed.   Constitutional:       General: She is not in acute distress.     Appearance: Normal appearance.   HENT:      Head: Normocephalic and atraumatic.   Eyes:      General: No scleral icterus.     Conjunctiva/sclera: Conjunctivae normal.   Pulmonary:      Effort: Pulmonary effort is normal. No respiratory distress.   Musculoskeletal:         General: No deformity.      Cervical back: Normal range of motion.   Neurological:      General: No focal deficit present.      Mental Status: She is alert.   Psychiatric:         Mood and Affect: Mood normal.         Behavior: Behavior normal.         Component      Latest Ref Rng 4/30/2024   Sodium      135 - 147 mmol/L 142    Potassium      3.5 - 5.3 mmol/L 4.4    Chloride      96 - 108 mmol/L 105    Carbon Dioxide      21 - 32 mmol/L 32    ANION GAP      4 - 13 mmol/L 5    BUN      5 - 25 mg/dL 18    Creatinine      0.60 - 1.30 mg/dL 0.65    GLUCOSE, FASTING      65 - 99 mg/dL 90    Calcium      8.4 - 10.2 mg/dL 10.0    AST      13 - 39 U/L 19    ALT      7 - 52 U/L 18    ALK PHOS      34 - 104 U/L 50    Total Protein      6.4 - 8.4 g/dL 7.0    Albumin      3.5 - 5.0 g/dL 4.1    Total Bilirubin      0.20 - 1.00 mg/dL 0.34    GFR, Calculated      ml/min/1.73sq m 89    FREE T4      0.61 - 1.12 ng/dL 0.74    C-TELOPEPTIDE,SERUM      pg/mL 339    Calcium, Ionized      1.12 - 1.32 mmol/L 1.28    PTH      12.0 - 88.0 pg/mL 57.9    VITAMIN D, 25-HYDROXY      30.0 " - 100.0 ng/mL 43.6    Phosphorus      2.3 - 4.1 mg/dL 3.7          10.15.2024    CENTRAL  DXA SCAN     CLINICAL HISTORY: 73 years postmenopausal female.  OTHER RISK FACTORS: Parental history of hip fracture status post minor injury, SSRI therapy.     PHARMACOLOGIC THERAPY FOR OSTEOPOROSIS: None currently.     TECHNIQUE: Bone densitometry was performed using a GE Lunar Prodigy bone densitometer.  Regions of interest appear properly placed.     COMPARISON: 10/3/2022.     RESULTS:     LUMBAR SPINE  Level: L1, L4   (L2, L3 vertebrae excluded from analysis due to local structural abnormalities or artifact):  BMD: 0.821 gm/cm2  T-score: -2.9        LEFT TOTAL HIP:  BMD: 0.760 gm/cm2  T-score: -2.0     LEFT FEMORAL NECK:  BMD: 0.697 gm/cm2  T score: -2.5     RIGHT TOTAL HIP:  BMD: 0.774 gm/cm2  T-score: -1.9     RIGHT FEMORAL NECK:  BMD: 0.746 gm/cm2  T score: -2.1        IMPRESSION:     1. Osteoporosis.     2.  Since a DXA study from 10/3/2022, there has been:  A  STATISTICALLY SIGNIFICANT DECREASE in bone mineral density of 0.019 g/cm2 (2.4%) in the hips.           3.  The 10 year risk of hip fracture is 10.5% with the 10 year risk of major osteoporotic fracture being 24.3% as calculated by the [employer] fracture risk assessment tool (FRAX, which is based on data generated by the WHO Collaborating   [place] for Metabolic Bone Diseases).     4.  The current NOF guidelines recommend treating patients with a T-score of -2.5 or less in the lumbar spine or hips, or in post-menopausal women and men over the age of 50 with low bone mass (osteopenia) and a FRAX 10 year risk score of >3% for hip   fracture and/or >20% for major osteoporotic fracture.     5.  The NOF recommends follow-up DXA in 1-2 years after initiating therapy for osteoporosis and every 2 years thereafter. More frequent evaluation is appropriate for patients with conditions associated with rapid bone loss, such as glucocorticoid   therapy. The  interval between DXA screenings may be longer for individuals without major risk factors and initial T-score in the normal or upper low bone mass range.

## 2024-10-30 NOTE — PATIENT INSTRUCTIONS
Osteoporosis Education   Osteoporosis  is a long-term medical condition that causes your bones to become weak, brittle, and more likely to fracture. Osteoporosis occurs when your body absorbs more bone than it makes. It is also caused by a lack of calcium and estrogen (female hormone).  Common symptoms include the following:  You may not have any signs or symptoms. You may break a bone after a muscle strain, bump, or fall. A break usually occurs in the hip, spine, or wrist. A collapsed vertebra (bone in your spine) may cause severe back pain or loss of height from bent posture.  Call your doctor if:    You have severe pain.   You have increasing pain after a fall.   You have pain when you do your daily activities.   You have questions or concerns about your condition or care.  Diagnosis of osteoporosis:   Blood and urine tests  measure your calcium, vitamin D, and estrogen levels.    An x-ray or CT may show thinned bones or a fracture. You may be given contrast liquid to help the bones show up better in the pictures. Tell the healthcare provider if you have ever had an allergic reaction to contrast liquid. Do not enter the MRI room with anything metal. Metal can cause serious injury. Tell the healthcare provider if you have any metal in or on your body.    A bone density test  compares your bone thickness with what is expected for someone of your age, gender, and ethnicity.  Treatment for osteoporosis may include medicines to prevent bone loss, build new bone, and increase estrogen. These medicines help prevent fractures and may be given as a pill or injection. Ask your healthcare provider for more information on these medicines.  Prevent bone loss:  Eat healthy foods that are high in calcium.  This helps keep your bones strong. Good sources of calcium are milk, cheese, broccoli, tofu, almonds, and canned salmon and sardines. Recommended to get at least 1200mg daily of calcium.  Increase your vitamin D intake.   Vitamin D is in fish oils, some vegetables, and fortified milk, cereal, and bread. Vitamin D is also formed in the skin when it is exposed to the sun. Ask your healthcare provider how much sunlight is safe for you. You will require at least 800 units of vitamin D daily taken as a supplement.  Drink liquids as directed.  Ask your healthcare provider how much liquid to drink each day and which liquids are best for you. Do not have alcohol or caffeine. They decrease bone mineral density, which can weaken your bones.  Exercise regularly.  Ask your healthcare provider about the best exercise plan for you. Weight bearing exercise for 30 minutes, 3 times a week can help build and strengthen bone.  Do not smoke.  Nicotine and other chemicals in cigarettes and cigars can cause lung damage. Ask your healthcare provider for information if you currently smoke and need help to quit. E-cigarettes or smokeless tobacco still contain nicotine. Talk to your healthcare provider before you use these products.  Go to physical therapy as directed.  A physical therapist teaches you exercises to help improve movement and muscle strength.  Alcohol. It is recommended to avoid heavy alcohol use as increased consumption of alcohol is known to cause bone loss  © Copyright [employer] 2021 Information is for End User's use only and may not be sold, redistributed or otherwise used for commercial purposes. All illustrations and images included in CareNotes® are the copyrighted property of [employer].A[employer] Inc. or [employer]    Medicines for Osteoporosis (The Basics)    What do osteoporosis medicines do?  If you have osteoporosis or a high risk of breaking a bone, the medicines your doctor prescribes can:  Reduce bone loss  Increase bone density or keep it about the same  Reduce the chances that you will break a bone    For the medicines to work, you must also take calcium and vitamin D supplements.     Which medicines might I need?  There are  "many different osteoporosis medicines. Your doctor will work with you to choose the best one for you.    Bisphosphonates  Most people being treated for osteoporosis take these medicines first. If they do not work well enough or cause side effects that are too hard to handle, there are other options.    Bisphosphonates come in a pill or a shot. Most people take one pill every week. If your doctor prescribes a bisphosphonate pill, you must take the medicine exactly as directed. If you don't, the medicine can irritate your throat or stomach. For most bisphosphonate pills, you must:    Take the pill first thing in the morning, before you have anything to eat or drink.  Drink an 8-ounce glass of water with the pill, but not eat or drink anything else for 30 minutes or 1 hour (depending on which pill you take).  Avoid lying down for 30 minutes after taking the pill. You must sit or stand during that time.    There is one bisphosphonate pill, delayed release risedronate (brand name: Atelvia), that is taken in a different way from the others. It is taken after breakfast with 4 ounces of water.    \"Estrogen-like\" medicines  Medicines called selective estrogen receptor modifiers (or \"SERMs\") act like the hormone \"estrogen.\" Estrogen helps prevent bone loss. After menopause, a woman's body has less estrogen. (Menopause is the time when a woman stops having periods.) SERMs can act like estrogen to stop bone loss. Some of them also reduce the risk of breast cancer in women at high risk. SERMs are only for women who have gone through menopause.    Hormone medicines  These medicines are sometimes called \"hormone replacement therapy\" or \"menopausal hormone therapy\" (MHT). After menopause, a woman's body has lower levels of certain hormones. Some women take MHT to replace these hormones. MHT can also protect against osteoporosis.    Hormones are not used often to treat osteoporosis in women who have gone through menopause. This is " "because other medicines usually work much better. But MHT can help women who have bothersome symptoms related to menopause (such as hot flashes) and who have osteoporosis but cannot take other osteoporosis medicines.    Women who have not gone through menopause might take hormones in birth control pills or a patch to prevent osteoporosis.    PTH or PTHrP analog  Both of these are artificial forms of hormones the body makes naturally. PTH stands for \"parathyroid hormone,\" and PTHrP stands for \"parathyroid hormone-related protein.\" Both tell the body to make new bone. They are usually only for people with severe osteoporosis.    Romosozumab (Evenity)  Romosozumab is a medicine that blocks a protein in the body. This protein usually stops new bone from being formed. Blocking the protein allows the body to make new bone. Romosozumab is usually only for people with severe osteoporosis.    Denosumab (Prolia)  Denosumab blocks a different protein in the body. This protein usually causes bone to break down. By blocking the protein, denosumab reduces bone loss and the chance of breaking a bone. If other osteoporosis medicines cause bad side effects or do not help, your doctor might give you denosumab. It might also be a good choice for people with kidney problems. When you stop taking denosumab, your bone density goes down again very quickly. Some people might be at higher risk for breaking a bone when this happens. If you stop denosumab, your doctor will prescribe a different osteoporosis medicine to prevent rapid bone loss.    Calcitonin  Calcitonin is a hormone the body makes naturally. Doctors can give a man-made form to treat osteoporosis. It is not used as often as other medicines because it does not work as well. But it can help relieve pain from broken bones in the spine. When used for broken bones in the spine, calcitonin should only be used until the pain is better (and not longer than 6 months). If you are put on " calcitonin, after 6 months you should switch to a medicine that is better at preventing fractures.    How long do I need to take osteoporosis medicines?  If you are at high risk for breaking a bone, you can safely take osteoporosis medicines for many years. If you are not at high risk for breaking a bone, you might be able to stop your medicine for a year or more. Your doctor will check your bone density to make sure you are not losing too much bone. If you do stop the medicine, you will probably need to start it again later.    What else should I know about medicines for osteoporosis?  Some people have heard that taking bisphosphonates for a long time can increase the risk of breaking certain bones. This is true, but it happens very rarely. Your chances of breaking a bone from osteoporosis are much higher than your chances of breaking one because you take bisphosphonates.    If you take osteoporosis medicines, your doctor will do regular exams and tests to see how well the medicines are working. If they are not working well, you might need a different medicine.    ©2021 [employer] All rights reserved.

## 2024-10-31 ENCOUNTER — TELEPHONE (OUTPATIENT)
Dept: SLEEP CENTER | Facility: CLINIC | Age: 74
End: 2024-10-31

## 2024-10-31 DIAGNOSIS — G47.33 OSA (OBSTRUCTIVE SLEEP APNEA): ICD-10-CM

## 2024-10-31 DIAGNOSIS — G47.19 EXCESSIVE DAYTIME SLEEPINESS: Primary | ICD-10-CM

## 2024-10-31 PROCEDURE — 95806 SLEEP STUDY UNATT&RESP EFFT: CPT | Performed by: INTERNAL MEDICINE

## 2024-10-31 NOTE — TELEPHONE ENCOUNTER
----- Message from Erwin Bee MD sent at 10/31/2024  1:40 PM EDT -----  Ok thank you Feng  I have ordered the CPAP study - the pt can call central scheduling to schedule it after pt informed of results.  Thank you  ----- Message -----  From: Feng Guerrero MD  Sent: 10/31/2024   1:36 PM EDT  To: Erwin Bee MD    I'd go with what you recommend :)  ----- Message -----  From: Erwin Bee MD  Sent: 10/31/2024   9:01 AM EDT  To: MD Jose L Cantu,    This pt had mild to moderate ELISABETH but 2 hours of hypoxemia probably related to underlying lung disease  I think a CPAP study would be best but if you want auto titration CPAP with nocturnal pulse ox when stable on CPAP that would be fine too.    Let mw know what you prefer and Ill order the CPAP study or CPAP.    Thanks,  Lobito

## 2024-10-31 NOTE — TELEPHONE ENCOUNTER
Patient of [facility],   Dr. Portillo-Sayed.     Home sleep study resulted and shows mild sleep apnea LEYDI 14.7 with baseline hypoxemia. Oxygen saturation <89% for 160 minutes. CPAP titration study ordered.     Called patient and left message advising I will send a [employer] message with the sleep study results and next steps.  Provided sleep center number([phone]) to call if any questions.

## 2024-11-04 ENCOUNTER — TELEPHONE (OUTPATIENT)
Age: 74
End: 2024-11-04

## 2024-11-04 NOTE — TELEPHONE ENCOUNTER
PA for Teriparatide (Forteo) 620 MCG/2.48ML   [facility]    via    [x]CMM-KEY: [id number]    Office notes sent, clinical questions answered. Awaiting determination    Turnaround time for your insurance to make a decision on your Prior Authorization can take 7-21 business days.

## 2024-11-04 NOTE — TELEPHONE ENCOUNTER
PA for  Teriparatide (Forteo) 620 MCG/2.48ML APPROVED     Date(s) approved 10/30/2024 to 10/30/2026    Case #[id number]    Patient advised by          []MyChart Message  [x]Phone call   []LMOM  []L/M to call office as no active Communication consent on file  []Unable to leave detailed message as VM not approved on Communication consent       Pharmacy advised by    [x]Fax  []Phone call    Approval letter scanned into [employer] Yes

## 2024-11-11 DIAGNOSIS — J45.40 MODERATE PERSISTENT ASTHMA WITHOUT COMPLICATION: ICD-10-CM

## 2024-11-12 RX ORDER — BUDESONIDE AND FORMOTEROL FUMARATE DIHYDRATE 160; 4.5 UG/1; UG/1
2 AEROSOL RESPIRATORY (INHALATION) 2 TIMES DAILY
Qty: 10.2 G | Refills: 2 | Status: SHIPPED | OUTPATIENT
Start: 2024-11-12

## 2024-11-13 ENCOUNTER — TELEPHONE (OUTPATIENT)
Age: 74
End: 2024-11-13
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
## 2024-11-27 ENCOUNTER — HOSPITAL ENCOUNTER (EMERGENCY)
Facility: HOSPITAL | Age: 74
Discharge: HOME | End: 2024-11-27
Attending: EMERGENCY MEDICINE
Payer: MEDICARE

## 2024-11-27 ENCOUNTER — APPOINTMENT (EMERGENCY)
Dept: CT IMAGING | Facility: HOSPITAL | Age: 74
End: 2024-11-27
Payer: MEDICARE

## 2024-11-27 ENCOUNTER — APPOINTMENT (EMERGENCY)
Dept: RADIOLOGY | Facility: HOSPITAL | Age: 74
End: 2024-11-27
Payer: MEDICARE

## 2024-11-27 VITALS
RESPIRATION RATE: 17 BRPM | DIASTOLIC BLOOD PRESSURE: 81 MMHG | SYSTOLIC BLOOD PRESSURE: 162 MMHG | TEMPERATURE: 97.2 F | HEART RATE: 79 BPM | OXYGEN SATURATION: 94 %

## 2024-11-27 DIAGNOSIS — S92.351A CLOSED FRACTURE OF BASE OF FIFTH METATARSAL BONE OF RIGHT FOOT: ICD-10-CM

## 2024-11-27 DIAGNOSIS — R07.81 RIB PAIN: ICD-10-CM
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
## 2024-11-27 LAB
ANION GAP SERPL CALCULATED.3IONS-SCNC: 5 MMOL/L (ref 4–13)
BASOPHILS # BLD AUTO: 0.03 THOUSANDS/ΜL (ref 0–0.1)
BASOPHILS NFR BLD AUTO: 0 % (ref 0–1)
BUN SERPL-MCNC: 19 MG/DL (ref 5–25)
CALCIUM SERPL-MCNC: 10.1 MG/DL (ref 8.4–10.2)
CHLORIDE SERPL-SCNC: 108 MMOL/L (ref 96–108)
CO2 SERPL-SCNC: 27 MMOL/L (ref 21–32)
CREAT SERPL-MCNC: 0.69 MG/DL (ref 0.6–1.3)
EOSINOPHIL # BLD AUTO: 0.22 THOUSAND/ΜL (ref 0–0.61)
EOSINOPHIL NFR BLD AUTO: 3 % (ref 0–6)
ERYTHROCYTE [DISTWIDTH] IN BLOOD BY AUTOMATED COUNT: 13.5 % (ref 11.6–15.1)
GFR SERPL CREATININE-BSD FRML MDRD: 87 ML/MIN/1.73SQ M
GLUCOSE SERPL-MCNC: 90 MG/DL (ref 65–140)
HCT VFR BLD AUTO: 46.2 % (ref 34.8–46.1)
HGB BLD-MCNC: 14.4 G/DL (ref 11.5–15.4)
IMM GRANULOCYTES # BLD AUTO: 0.02 THOUSAND/UL (ref 0–0.2)
IMM GRANULOCYTES NFR BLD AUTO: 0 % (ref 0–2)
LYMPHOCYTES # BLD AUTO: 2.16 THOUSANDS/ΜL (ref 0.6–4.47)
LYMPHOCYTES NFR BLD AUTO: 26 % (ref 14–44)
MCH RBC QN AUTO: 29.3 PG (ref 26.8–34.3)
MCHC RBC AUTO-ENTMCNC: 31.2 G/DL (ref 31.4–37.4)
MCV RBC AUTO: 94 FL (ref 82–98)
MONOCYTES # BLD AUTO: 0.68 THOUSAND/ΜL (ref 0.17–1.22)
MONOCYTES NFR BLD AUTO: 8 % (ref 4–12)
NEUTROPHILS # BLD AUTO: 5.06 THOUSANDS/ΜL (ref 1.85–7.62)
NEUTS SEG NFR BLD AUTO: 63 % (ref 43–75)
NRBC BLD AUTO-RTO: 0 /100 WBCS
PLATELET # BLD AUTO: 274 THOUSANDS/UL (ref 149–390)
PMV BLD AUTO: 10.5 FL (ref 8.9–12.7)
POTASSIUM SERPL-SCNC: 3.9 MMOL/L (ref 3.5–5.3)
RBC # BLD AUTO: 4.91 MILLION/UL (ref 3.81–5.12)
SODIUM SERPL-SCNC: 140 MMOL/L (ref 135–147)
WBC # BLD AUTO: 8.17 THOUSAND/UL (ref 4.31–10.16)

## 2024-11-27 PROCEDURE — 70450 CT HEAD/BRAIN W/O DYE: CPT

## 2024-11-27 PROCEDURE — 36415 COLL VENOUS BLD VENIPUNCTURE: CPT | Performed by: EMERGENCY MEDICINE

## 2024-11-27 PROCEDURE — 99284 EMERGENCY DEPT VISIT MOD MDM: CPT

## 2024-11-27 PROCEDURE — 99285 EMERGENCY DEPT VISIT HI MDM: CPT | Performed by: EMERGENCY MEDICINE

## 2024-11-27 PROCEDURE — 73630 X-RAY EXAM OF FOOT: CPT

## 2024-11-27 PROCEDURE — 85025 COMPLETE CBC W/AUTO DIFF WBC: CPT | Performed by: EMERGENCY MEDICINE

## 2024-11-27 PROCEDURE — 80048 BASIC METABOLIC PNL TOTAL CA: CPT | Performed by: EMERGENCY MEDICINE

## 2024-11-27 PROCEDURE — 96374 THER/PROPH/DIAG INJ IV PUSH: CPT

## 2024-11-27 PROCEDURE — 74177 CT ABD & PELVIS W/CONTRAST: CPT

## 2024-11-27 RX ORDER — KETOROLAC TROMETHAMINE 30 MG/ML
15 INJECTION, SOLUTION INTRAMUSCULAR; INTRAVENOUS ONCE
Status: COMPLETED | OUTPATIENT
Start: 2024-11-27 | End: 2024-11-27

## 2024-11-27 RX ADMIN — KETOROLAC TROMETHAMINE 15 MG: 30 INJECTION, SOLUTION INTRAMUSCULAR at 20:27

## 2024-11-27 RX ADMIN — IOHEXOL 100 ML: 350 INJECTION, SOLUTION INTRAVENOUS at 21:24

## 2024-11-28 NOTE — ED PROVIDER NOTES
Time reflects when diagnosis was documented in both MDM as applicable and the Disposition within this note       Time User Action Codes Description Comment    11/27/2024 10:26 PM Catracho Kim [W19.XXXA] Fall, initial encounter     11/27/2024 10:27 PM Catracho Kim [R07.81] Rib pain           ED Disposition       ED Disposition   Discharge    Condition   Stable    Date/Time   Wed Nov 27, 2024 10:26 PM    Comment   Shell Mccann discharge to home/self care.                   Assessment & Plan       Medical Decision Making  73-year-old female with fall.  No traumatic injuries identified on CT chest abdomen pelvis.  I had suspicion for rib fractures these were not identified.  Discussed atelectasis minimizing lifestyle including taking deep breaths during TV breaks etc.  Discussed staying active.  Discussed orthopedic follow-up for the foot.  Patient neurovascularly intact in the right lower extremity.  Pain well-controlled.  Patient ambulating without difficulty in our emergency department.  Discussed warning signs and symptoms with the patient as well as when to return to the emergency department versus follow up with [facility]. Patient states understanding and agreement with the plan.    This note was completed using dictation software.       Amount and/or Complexity of Data Reviewed  Independent Historian: spouse  External Data Reviewed: notes.  Labs: ordered.  Radiology: ordered.    Risk  OTC drugs.  Prescription drug management.             Medications   ketorolac (TORADOL) injection 15 mg (15 mg Intravenous Given 11/27/24 2027)   iohexol (OMNIPAQUE) 350 MG/ML injection (MULTI-DOSE) 100 mL (100 mL Intravenous Given 11/27/24 2124)       ED Risk Strat Scores                           SBIRT 20yo+      Flowsheet Row Most Recent Value   Initial Alcohol Screen: US AUDIT-C     1. How often do you have a drink containing alcohol? 0 Filed at: 11/27/2024 2001   2. How many drinks containing alcohol do you have on  a typical day you are drinking?  0 Filed at: 11/27/2024 2001   3a. Male UNDER 65: How often do you have five or more drinks on one occasion? 0 Filed at: 11/27/2024 2001   3b. FEMALE Any Age, or MALE 65+: How often do you have 4 or more drinks on one occassion? 0 Filed at: 11/27/2024 2001   Audit-C Score 0 Filed at: 11/27/2024 2001   [place]: How many times in the past year have you...    Used an illegal drug or used a prescription medication for non-medical reasons? Never Filed at: 11/27/2024 2001                            History of Present Illness       Chief Complaint   Patient presents with    Fall     Fall on Sunday night, right foot bruising and pain  No headstrike but hit right side of ribs and having pain there as well.        Past Medical History:   Diagnosis Date    Allergic     Seasonal    Arthritis ?    Asthma     Cellulitis 03/06/2024    Depression     Disease of thyroid gland     Diverticulitis of colon About 10 yrs ago    Severe    Headache(784.0) When I was 14 yrs old    Migraines    Hypercholesteremia     Migraine     Osteoporosis       Past Surgical History:   Procedure Laterality Date    COLON SURGERY  2021    Prolapse rectim    FINGER FRACTURE SURGERY      FRACTURE SURGERY  2015    Lower left plate,9 screws,2 pins    HYSTERECTOMY      LEG SURGERY      RECTAL PROLAPSE REPAIR      TOTAL ABDOMINAL HYSTERECTOMY W/ BILATERAL SALPINGOOPHORECTOMY        Family History   Problem Relation Age of Onset    Vision loss Mother     Glaucoma Mother     Migraines Mother     Osteoporosis Mother     Cancer Father     Asthma Father         Legionaries disease    COPD Father         Legionnaires disease    Hearing loss Father     Ovarian cancer Maternal Grandmother     Migraines Maternal Grandfather     No Known Problems Paternal Grandmother     No Known Problems Paternal Grandfather     No Known Problems Paternal Aunt     No Known Problems Paternal Aunt     BRCA2 Positive Neg Hx     BRCA2 Negative Neg Hx     BRCA1  Negative Neg Hx     BRCA1 Positive Neg Hx     BRCA 1/2 Neg Hx     Endometrial cancer Neg Hx     Colon cancer Neg Hx     Breast cancer additional onset Neg Hx     Breast cancer Neg Hx       Social History     Tobacco Use    Smoking status: Former     Current packs/day: 0.00     Average packs/day: 0.3 packs/day for 15.0 years (3.8 ttl pk-yrs)     Types: Cigarettes     Start date: 2008     Quit date: 2023     Years since quittin.5     Passive exposure: Past    Smokeless tobacco: Never    Tobacco comments:     Started smoking at 17yrs. Stopped at 32yrs. Smoked for a few years later in life   Vaping Use    Vaping status: Never Used   Substance Use Topics    Alcohol use: Not Currently     Comment: social    Drug use: No      E-Cigarette/Vaping    E-Cigarette Use Never User       E-Cigarette/Vaping Substances    Nicotine No     THC No     CBD No     Flavoring No     Other No     Unknown No       I have reviewed and agree with the history as documented.     73-year-old female reports fall approximately 4 days before presentation.  Patient has persisted and possibly worsening.  She has the worst pain is in the right ribs and she is concerned that she broke ribs as she has been told she has severe osteoporosis.  Patient also notes some pain in the right foot which is especially worse when walking.  Has tried some over-the-counter meds which have not been helpful.  Is not having significant dyspnea.  Is confident she did not hit her head.  There is no loss of consciousness.  States that she fell because of her poor balance and not because of any syncope or presyncope.        Review of Systems   Constitutional:  Negative for activity change, chills, fatigue and fever.   HENT:  Negative for congestion.    Eyes:  Negative for visual disturbance.   Respiratory:  Negative for cough, chest tightness and shortness of breath.    Cardiovascular:  Negative for chest pain.   Gastrointestinal:  Negative for abdominal pain,  diarrhea and vomiting.   Genitourinary:  Negative for dysuria.   Skin:  Negative for rash.   Neurological:  Negative for dizziness, weakness and numbness.           Objective       ED Triage Vitals [11/27/24 2002]   Temperature Pulse Blood Pressure Respirations SpO2 Patient Position - Orthostatic VS   (!) 97.2 °F (36.2 °C) 79 162/81 17 94 % Sitting      Temp Source Heart Rate Source BP Location FiO2 (%) Pain Score    Temporal Monitor Left arm -- 9      Vitals      Date and Time Temp Pulse SpO2 Resp BP Pain Score FACES Pain Rating User   11/27/24 2027 -- -- -- -- -- 7 -- AM   11/27/24 2002 97.2 °F (36.2 °C) 79 94 % 17 162/81 9 -- EM            Physical Exam  Constitutional:       General: She is not in acute distress.     Appearance: She is well-developed. She is not ill-appearing, toxic-appearing or diaphoretic.   HENT:      Head: Normocephalic and atraumatic.      Right Ear: External ear normal.      Left Ear: External ear normal.      Nose: Nose normal.      Mouth/Throat:      Mouth: Mucous membranes are moist.      Pharynx: Oropharynx is clear.   Eyes:      Conjunctiva/sclera: Conjunctivae normal.      Pupils: Pupils are equal, round, and reactive to light.   Cardiovascular:      Rate and Rhythm: Normal rate and regular rhythm.      Heart sounds: Normal heart sounds.   Pulmonary:      Effort: Pulmonary effort is normal. No respiratory distress.      Breath sounds: Normal breath sounds.   Abdominal:      General: Bowel sounds are normal.      Palpations: Abdomen is soft.   Musculoskeletal:         General: Normal range of motion.      Cervical back: Normal range of motion and neck supple.      Comments: Tenderness over right fifth sixth seventh rib.  Pain over distal right forefoot   Skin:     General: Skin is warm and dry.      Capillary Refill: Capillary refill takes less than 2 seconds.   Neurological:      Mental Status: She is alert and oriented to person, place, and time.   Psychiatric:         Behavior:  Behavior normal.         Results Reviewed       Procedure Component Value Units Date/Time    Basic metabolic panel [[id number]] Collected: 11/27/24 2027    Lab Status: Final result Specimen: Blood from Arm, Right Updated: 11/27/24 2054     Sodium 140 mmol/L      Potassium 3.9 mmol/L      Chloride 108 mmol/L      CO2 27 mmol/L      ANION GAP 5 mmol/L      BUN 19 mg/dL      Creatinine 0.69 mg/dL      Glucose 90 mg/dL      Calcium 10.1 mg/dL      eGFR 87 ml/min/1.73sq m     Narrative:      National Kid[employer] guidelines for Chronic Kidney Disease (CKD):     Stage 1 with normal or high GFR (GFR > 90 mL/min/1.73 square meters)    Stage 2 Mild CKD (GFR = 60-89 mL/min/1.73 square meters)    Stage 3A Moderate CKD (GFR = 45-59 mL/min/1.73 square meters)    Stage 3B Moderate CKD (GFR = 30-44 mL/min/1.73 square meters)    Stage 4 Severe CKD (GFR = 15-29 mL/min/1.73 square meters)    Stage 5 End Stage CKD (GFR <15 mL/min/1.73 square meters)  Note: GFR calculation is accurate only with a steady state creatinine    CBC and differential [[id number]]  (Abnormal) Collected: 11/27/24 2027    Lab Status: Final result Specimen: Blood from Arm, Right Updated: 11/27/24 2038     WBC 8.17 Thousand/uL      RBC 4.91 Million/uL      Hemoglobin 14.4 g/dL      Hematocrit 46.2 %      MCV 94 fL      MCH 29.3 pg      MCHC 31.2 g/dL      RDW 13.5 %      MPV 10.5 fL      Platelets 274 Thousands/uL      nRBC 0 /100 WBCs      Segmented % 63 %      Immature Grans % 0 %      Lymphocytes % 26 %      Monocytes % 8 %      Eosinophils Relative 3 %      Basophils Relative 0 %      Absolute Neutrophils 5.06 Thousands/µL      Absolute Immature Grans 0.02 Thousand/uL      Absolute Lymphocytes 2.16 Thousands/µL      Absolute Monocytes 0.68 Thousand/µL      Eosinophils Absolute 0.22 Thousand/µL      Basophils Absolute 0.03 Thousands/µL             CT abdomen pelvis with contrast   Final Interpretation by Willem Goff MD (11/27 2214)          1. No evidence of trauma in the abdomen or pelvis.   2. Findings compatible with constipation.         Workstation performed: [id number]         CT head without contrast   Final Interpretation by Willem Goff MD (11/27 2206)      No acute intracranial abnormality.                  Workstation performed: [id number]         XR foot 3+ views RIGHT    (Results Pending)       Procedures    ED Medication and Procedure Management   Prior to Admission Medications   Prescriptions Last Dose Informant Patient Reported? Taking?   Cholecalciferol (VITAMIN D PO)  Self Yes No   Sig: Take 5,000 Units by mouth in the morning   Coenzyme Q10 (COQ10) 100 MG CAPS  Self Yes No   Sig: Take by mouth in the morning   FLUoxetine (PROzac) 10 mg capsule   No No   Sig: Take 1 capsule (10 mg total) by mouth daily   Insulin Pen Needle (BD Pen Needle Yaritza U/F) 32G X 4 MM MISC   No No   Sig: M81.0 for use with daily forteo   Magnesium 100 MG TABS  Self Yes No   Sig: Take by mouth in the morning   Multiple Vitamin (MULTI-VITAMIN DAILY PO)  Self Yes No   Sig: once daily   Omega-3 1000 MG CAPS  Self Yes No   Sig: Take by mouth in the morning   Potassium Gluconate 80 MG TABS  Self Yes No   Sig: Take by mouth in the morning   SUMAtriptan (IMITREX) 6 mg/0.5 mL   No No   Sig: Inject 0.5 mL (6 mg total) under the skin once for 1 dose   Teriparatide (Forteo) 620 MCG/2.48ML SOPN   No No   Sig: Inject 20 mcg under the skin daily   albuterol (Ventolin HFA) 90 mcg/act inhaler  Self No No   Sig: Inhale 2 puffs every 4 (four) hours as needed for wheezing   buPROPion (WELLBUTRIN XL) 300 mg 24 hr tablet  Self No No   Sig: Take 1 tablet (300 mg total) by mouth every morning   budesonide-formoterol (SYMBICORT) 160-4.5 mcg/act inhaler   No No   Sig: INHALE 2 PUFFS 2 (TWO) TIMES A DAY RINSE MOUTH AFTER USE.   butalbital-acetaminophen-caffeine (FIORICET,ESGIC) -40 mg per tablet   No No   Sig: TAKE 1 TABLET BY MOUTH EVERY 6 (SIX) HOURS AS NEEDED FOR  HEADACHES   ezetimibe-simvastatin (VYTORIN) 10-40 mg per tablet  Self No No   Sig: TAKE 1 TABLET BY MOUTH DAILY AT BEDTIME   fenofibrate micronized (ANTARA) 130 MG capsule  Self No No   Sig: TAKE 1 CAPSULE (130 MG TOTAL) BY MOUTH DAILY WITH BREAKFAST   guaiFENesin (MUCINEX) 600 mg 12 hr tablet   No No   Sig: Take 2 tablets (1,200 mg total) by mouth every 12 (twelve) hours   levocetirizine (XYZAL) 5 MG tablet   No No   Sig: Take 1 tablet (5 mg total) by mouth every evening   levothyroxine 50 mcg tablet  Self No No   Sig: TAKE 1 TABLET (50 MCG TOTAL) BY MOUTH DAILY   metoprolol succinate (Toprol XL) 100 mg 24 hr tablet   No No   Sig: Take 1 tablet (100 mg total) by mouth daily   tiotropium (Spiriva Respimat) 2.5 MCG/ACT AERS inhaler   No No   Sig: Inhale 2 puffs daily      Facility-Administered Medications: None     Discharge Medication List as of 11/27/2024 10:27 PM        CONTINUE these medications which have NOT CHANGED    Details   albuterol (Ventolin HFA) 90 mcg/act inhaler Inhale 2 puffs every 4 (four) hours as needed for wheezing, Starting u 10/26/2023, Normal      budesonide-formoterol (SYMBICORT) 160-4.5 mcg/act inhaler INHALE 2 PUFFS 2 (TWO) TIMES A DAY RINSE MOUTH AFTER USE., Starting Tue 11/12/2024, Normal      buPROPion (WELLBUTRIN XL) 300 mg 24 hr tablet Take 1 tablet (300 mg total) by mouth every morning, Starting Mon 1/29/2024, Until Wed 10/30/2024, Normal      butalbital-acetaminophen-caffeine (FIORICET,ESGIC) -40 mg per tablet TAKE 1 TABLET BY MOUTH EVERY 6 (SIX) HOURS AS NEEDED FOR HEADACHES, Starting Tue 7/16/2024, Normal      Cholecalciferol (VITAMIN D PO) Take 5,000 Units by mouth in the morning, Historical Med      Coenzyme Q10 (COQ10) 100 MG CAPS Take by mouth in the morning, Historical Med      ezetimibe-simvastatin (VYTORIN) 10-40 mg per tablet TAKE 1 TABLET BY MOUTH DAILY AT BEDTIME, Starting Mon 4/4/2022, Normal      fenofibrate micronized (ANTARA) 130 MG capsule TAKE 1 CAPSULE (130  MG TOTAL) BY MOUTH DAILY WITH BREAKFAST, Starting Mon 4/4/2022, Normal      FLUoxetine (PROzac) 10 mg capsule Take 1 capsule (10 mg total) by mouth daily, Starting Fri 8/9/2024, Until Wed 10/30/2024, Normal      guaiFENesin (MUCINEX) 600 mg 12 hr tablet Take 2 tablets (1,200 mg total) by mouth every 12 (twelve) hours, Starting Thu 6/13/2024, Normal      Insulin Pen Needle (BD Pen Needle Yaritza U/F) 32G X 4 MM MISC M81.0 for use with daily forteo, Normal      levocetirizine (XYZAL) 5 MG tablet Take 1 tablet (5 mg total) by mouth every evening, Starting Fri 8/9/2024, Until Wed 2/5/2025, Normal      levothyroxine 50 mcg tablet TAKE 1 TABLET (50 MCG TOTAL) BY MOUTH DAILY, Starting Mon 4/4/2022, Normal      Magnesium 100 MG TABS Take by mouth in the morning, Historical Med      metoprolol succinate (Toprol XL) 100 mg 24 hr tablet Take 1 tablet (100 mg total) by mouth daily, Starting Wed 6/28/2023, Until Wed 10/30/2024, Normal      Multiple Vitamin (MULTI-VITAMIN DAILY PO) once daily, Historical Med      Omega-3 1000 MG CAPS Take by mouth in the morning, Historical Med      Potassium Gluconate 80 MG TABS Take by mouth in the morning, Historical Med      SUMAtriptan (IMITREX) 6 mg/0.5 mL Inject 0.5 mL (6 mg total) under the skin once for 1 dose, Starting Mon 1/29/2024, Normal      Teriparatide (Forteo) 620 MCG/2.48ML SOPN Inject 20 mcg under the skin daily, Starting Wed 10/30/2024, Until Sat 10/25/2025, Normal      tiotropium (Spiriva Respimat) 2.5 MCG/ACT AERS inhaler Inhale 2 puffs daily, Starting Wed 9/4/2024, Normal           No discharge procedures on file.  ED SEPSIS DOCUMENTATION   Time reflects when diagnosis was documented in both MDM as applicable and the Disposition within this note       Time User Action Codes Description Comment    11/27/2024 10:26 PM Catracho Kim Add [W19.XXXA] Fall, initial encounter     11/27/2024 10:27 PM Catracho Kim Add [R07.81] Rib pain                  Catracho Kim MD  11/29/24  0025

## 2024-12-03 ENCOUNTER — RESULTS FOLLOW-UP (OUTPATIENT)
Dept: EMERGENCY DEPT | Facility: HOSPITAL | Age: 74
End: 2024-12-03

## 2024-12-03 DIAGNOSIS — G43.809 OTHER MIGRAINE WITHOUT STATUS MIGRAINOSUS, NOT INTRACTABLE: ICD-10-CM

## 2024-12-03 RX ORDER — BUTALBITAL, ACETAMINOPHEN AND CAFFEINE 50; 325; 40 MG/1; MG/1; MG/1
1 TABLET ORAL EVERY 6 HOURS PRN
Qty: 60 TABLET | Refills: 1 | Status: SHIPPED | OUTPATIENT
Start: 2024-12-03

## 2024-12-04 ENCOUNTER — TELEPHONE (OUTPATIENT)
Age: 74
End: 2024-12-04

## 2024-12-04 ENCOUNTER — HOSPITAL ENCOUNTER (OUTPATIENT)
Dept: SLEEP CENTER | Facility: HOSPITAL | Age: 74
Discharge: HOME | End: 2024-12-04
Attending: INTERNAL MEDICINE
Payer: MEDICARE

## 2024-12-04 DIAGNOSIS — G47.19 EXCESSIVE DAYTIME SLEEPINESS: ICD-10-CM

## 2024-12-04 DIAGNOSIS — G47.33 OSA (OBSTRUCTIVE SLEEP APNEA): ICD-10-CM

## 2024-12-04 PROCEDURE — 95811 POLYSOM 6/>YRS CPAP 4/> PARM: CPT

## 2024-12-04 PROCEDURE — 95811 POLYSOM 6/>YRS CPAP 4/> PARM: CPT | Performed by: INTERNAL MEDICINE

## 2024-12-05 ENCOUNTER — APPOINTMENT (OUTPATIENT)
Dept: RADIOLOGY | Facility: MEDICAL CENTER | Age: 74
End: 2024-12-05
Payer: MEDICARE

## 2024-12-05 ENCOUNTER — OFFICE VISIT (OUTPATIENT)
Dept: PODIATRY | Facility: CLINIC | Age: 74
End: 2024-12-05
Payer: MEDICARE

## 2024-12-05 VITALS
SYSTOLIC BLOOD PRESSURE: 147 MMHG | TEMPERATURE: 98 F | BODY MASS INDEX: 29.57 KG/M2 | OXYGEN SATURATION: 97 % | HEIGHT: 61 IN | HEART RATE: 71 BPM | DIASTOLIC BLOOD PRESSURE: 92 MMHG | WEIGHT: 156.6 LBS | RESPIRATION RATE: 16 BRPM

## 2024-12-05 DIAGNOSIS — S92.351A CLOSED FRACTURE OF BASE OF FIFTH METATARSAL BONE OF RIGHT FOOT: ICD-10-CM

## 2024-12-05 PROCEDURE — 99203 OFFICE O/P NEW LOW 30 MIN: CPT | Performed by: PODIATRIST

## 2024-12-05 PROCEDURE — 73630 X-RAY EXAM OF FOOT: CPT

## 2024-12-05 RX ORDER — LORAZEPAM 1 MG/1
1 TABLET ORAL
COMMUNITY

## 2024-12-05 NOTE — PROGRESS NOTES
Name: Shell Mccann      : 1950      MRN: [id number]  Encounter Provider: Wandy Espinosa DPM  Encounter Date: 2024   [facility]  :  Assessment & Plan  Closed fracture of base of fifth metatarsal bone of right foot    Orders:    Ambulatory Referral to Orthopedic Surgery    XR foot 3+ vw right; Future      Imaging Reviewed at this visit (I personally reviewed/independently interpreted images and reports in PACS)  XR 2024 foot WB right date: Minimally displaced fifth metatarsal styloid process fracture.  X-ray 2024 right foot nonweightbearing: Minimally displaced fifth metatarsal styloid process fracture.  No significant change since last exam     IMPRESSION:  RLE closed fifth metatarsal base fracture    PLAN:  I reviewed clinical exam and radiographic imaging (XR) with patient in detail today. I have discussed with the patient the pathophysiology of this diagnosis and reviewed how the examination correlates with this diagnosis.  ED note from 2024 reviewed   Patient noncompliant with weightbearing restrictions and use of tall cam boot and crutches   Patient educated on fifth metatarsal base fracture.  Patient educated on nonunion rates.  Patient will need strict immobilization of right foot with limited weightbearing.  Recommended crutches, walker, rolling scooter for ambulation.  Patient may weight-bear for activities of daily living with use of walker and other offloading device.  Patient has concerns with falling due to immobilization she should use assistive devices as recommended above.  Patient understands that delayed healing, nonunion, diastases of fracture may require surgical intervention or utilization of other treatment modalities such as bone stimulator.  Bone fractures typically take 6 to 8 weeks to heal.  Due to noncompliance with weightbearing and osteoporosis healing time may be longer with a higher risk of complications    Shoe lift can be worn on opposite shoe for balance  Instructions were given for conservative care which was also demonstrated during the clinical visit.   Rest, ice, elevate, Tylenol for pain  F/u 2 weeks for repeat x-ray    History of Present Illness     HPI  Shell Mccann is a 73 y.o. female who presents for evaluation and management of closed right fifth metatarsal zone 1 avulsion fracture.  Patient fell injuring her right foot and ribs the Sunday before Thanksgiving.  She was evaluated that Tuesday and was initially told that she had no fracture of her right foot and was given a tall cam boot and told to follow-up with podiatry.  She was discharged from the ED but later contacted about positive findings on on her x-ray and she was told she had a fracture.  She was told to come into the ED to get crutches.  Patient states that she was unable to get the crutches she also did not like wearing the cam boot.  Patient has been ambulating in regular sneakers.  She has been taking naproxen for pain but she only iced the area for 2 days after initial injury.  She states she still has pain in the area and she presents today for further evaluation and management      Review of Systems   Constitutional:  Negative for chills and fever.   Respiratory:  Negative for shortness of breath.    Cardiovascular:  Negative for chest pain and leg swelling.   Musculoskeletal:  Positive for gait problem.     Past Medical History   Past Medical History:   Diagnosis Date    Allergic     Seasonal    Arthritis ?    Asthma     Cellulitis 03/06/2024    Depression     Disease of thyroid gland     Diverticulitis of colon About 10 yrs ago    Severe    Headache(784.0) When I was 14 yrs old    Migraines    Hypercholesteremia     Migraine     Osteoporosis      Past Surgical History:   Procedure Laterality Date    COLON SURGERY  2021    Prolapse rectim    FINGER FRACTURE SURGERY      FRACTURE SURGERY  2015    Lower left plate,9 screws,2 pins     HYSTERECTOMY      LEG SURGERY      RECTAL PROLAPSE REPAIR      TOTAL ABDOMINAL HYSTERECTOMY W/ BILATERAL SALPINGOOPHORECTOMY       Family History   Problem Relation Age of Onset    Vision loss Mother     Glaucoma Mother     Migraines Mother     Osteoporosis Mother     Cancer Father     Asthma Father         Legionaries disease    COPD Father         Legionnaires disease    Hearing loss Father     Ovarian cancer Maternal Grandmother     Migraines Maternal Grandfather     No Known Problems Paternal Grandmother     No Known Problems Paternal Grandfather     No Known Problems Paternal Aunt     No Known Problems Paternal Aunt     BRCA2 Positive Neg Hx     BRCA2 Negative Neg Hx     BRCA1 Negative Neg Hx     BRCA1 Positive Neg Hx     BRCA 1/2 Neg Hx     Endometrial cancer Neg Hx     Colon cancer Neg Hx     Breast cancer additional onset Neg Hx     Breast cancer Neg Hx       reports that she quit smoking about 19 months ago. Her smoking use included cigarettes. She started smoking about 16 years ago. She has a 3.8 pack-year smoking history. She has been exposed to tobacco smoke. She has never used smokeless tobacco. She reports that she does not currently use alcohol. She reports that she does not use drugs.  Current Outpatient Medications on File Prior to Visit   Medication Sig Dispense Refill    albuterol (Ventolin HFA) 90 mcg/act inhaler Inhale 2 puffs every 4 (four) hours as needed for wheezing 18 g 0    budesonide-formoterol (SYMBICORT) 160-4.5 mcg/act inhaler INHALE 2 PUFFS 2 (TWO) TIMES A DAY RINSE MOUTH AFTER USE. 10.2 g 2    buPROPion (WELLBUTRIN XL) 300 mg 24 hr tablet Take 1 tablet (300 mg total) by mouth every morning 30 tablet 5    butalbital-acetaminophen-caffeine (FIORICET,ESGIC) -40 mg per tablet TAKE 1 TABLET BY MOUTH EVERY 6 (SIX) HOURS AS NEEDED FOR HEADACHES 60 tablet 1    Cholecalciferol (VITAMIN D PO) Take 5,000 Units by mouth in the morning      Coenzyme Q10 (COQ10) 100 MG CAPS Take by mouth  "in the morning      ezetimibe-simvastatin (VYTORIN) 10-40 mg per tablet TAKE 1 TABLET BY MOUTH DAILY AT BEDTIME 90 tablet 1    fenofibrate micronized (ANTARA) 130 MG capsule TAKE 1 CAPSULE (130 MG TOTAL) BY MOUTH DAILY WITH BREAKFAST 90 capsule 1    FLUoxetine (PROzac) 10 mg capsule Take 1 capsule (10 mg total) by mouth daily 30 capsule 0    guaiFENesin (MUCINEX) 600 mg 12 hr tablet Take 2 tablets (1,200 mg total) by mouth every 12 (twelve) hours 60 tablet 5    Insulin Pen Needle (BD Pen Needle Yaritza U/F) 32G X 4 MM MISC M81.0 for use with daily forteo 100 each 3    levocetirizine (XYZAL) 5 MG tablet Take 1 tablet (5 mg total) by mouth every evening 30 tablet 5    levothyroxine 50 mcg tablet TAKE 1 TABLET (50 MCG TOTAL) BY MOUTH DAILY 90 tablet 1    LORazepam (ATIVAN) 1 mg tablet Take 1 mg by mouth      Magnesium 100 MG TABS Take by mouth in the morning      metoprolol succinate (Toprol XL) 100 mg 24 hr tablet Take 1 tablet (100 mg total) by mouth daily 30 tablet 5    Multiple Vitamin (MULTI-VITAMIN DAILY PO) once daily      Omega-3 1000 MG CAPS Take by mouth in the morning      Potassium Gluconate 80 MG TABS Take by mouth in the morning      SUMAtriptan (IMITREX) 6 mg/0.5 mL Inject 0.5 mL (6 mg total) under the skin once for 1 dose 0.5 mL 1    Teriparatide (Forteo) 620 MCG/2.48ML SOPN Inject 20 mcg under the skin daily 7.2 mL 3    tiotropium (Spiriva Respimat) 2.5 MCG/ACT AERS inhaler Inhale 2 puffs daily 4 g 5     No current facility-administered medications on file prior to visit.     Allergies   Allergen Reactions    Bee Venom     Latex     Pollen Extract Other (See Comments)     Seasonal allergies           Objective   /92   Pulse 71   Temp 98 °F (36.7 °C)   Resp 16   Ht 5' 1\" (1.549 m)   Wt 71 kg (156 lb 9.6 oz)   SpO2 97%   BMI 29.59 kg/m²      Physical Exam  Cardiovascular:      Comments: dP/PT pulse 2/4  CRT brisk  Decreased temperature gradient from knees to digits  Musculoskeletal:         " General: Swelling, tenderness and signs of injury present.      Comments: Right foot minimal edema along the dorsal lateral aspect of the right foot  RLE ecchymosis noted dorsal forefoot and lateral rear foot  Tender with palpation over fifth metatarsal styloid process.  Tender with palpation of peroneal tendon at level of insertion.  No pain with minimal active and passive range of motion.  Pain with resisted eversion.  No pain with palpation of fifth metatarsal shaft.  No pain with range of motion of metatarsophalangeal joints   Skin:     Capillary Refill: Capillary refill takes 2 to 3 seconds.      Findings: Bruising present.      Comments: Mild edema with ecchymosis noted to dorsal lateral foot and lateral heel plantarly.  No fracture blisters noted, no skin tenting noted

## 2024-12-05 NOTE — PROGRESS NOTES
Sleep Study Documentation    Pre-Sleep Study       Sleep testing procedure explained to patient:YES    Patient napped prior to study:NO    Caffeine:Dayshift worker after 12PM.  Caffeine use:YES- energy drinks  1 serving    Alcohol:Dayshift workers after 5PM: Alcohol use:NO    Typical day for patient:YES       Study Documentation    Sleep Study Indications: The patient is here for a CPAP titration. She had a home study that resulted in an LEYDI>5.     Sleep Study: Treatment   Optimal PAP pressure: 7cm  Leak:Small  Snore:Eliminated  REM Obtained:yes  Supplemental O2: no    Minimum SaO2 87  Baseline SaO2 90  PAP mask tried (list all) ResMed AirFit F20 size medium   PAP mask choice (final)ResMed AirFit F20 size medium   PAP mask type:full face  PAP pressure at which snoring was eliminated 4cm--no snoring recorded.   Mode of Therapy:CPAP  ETCO2:No  CPAP changed to BiPAP:No    Mode of Therapy:CPAP    EKG abnormalities: Yes PVCs noted throughout the study    EEG abnormalities: no    Were abnormal behaviors in sleep observed:NO    Is Total Sleep Study Recording Time < 2 hours: N/A    Is Total Sleep Study Recording Time > 2 hours but study is incomplete: N/A    Is Total Sleep Study Recording Time 6 hours or more but sleep was not obtained: NO    Patient classification: retired       Post-Sleep Study    Medication used at bedtime or during sleep study:YES other prescription medications    Patient reports time it took to fall asleep:20 to 30 minutes    Patient reports waking up during study:Denied    Patient reports sleeping 6 to 8 hours with dreaming.    Does the Patient feel this is a typical night of sleep:better than usual    Patient rated sleepiness: Not sleepy or tired    PAP treatment:yes: Post PAP treatment patient reports feeling better and  would wear PAP mask at home.                  
[facility]

## 2024-12-06 DIAGNOSIS — G47.19 EXCESSIVE DAYTIME SLEEPINESS: ICD-10-CM

## 2024-12-06 DIAGNOSIS — G47.33 OSA (OBSTRUCTIVE SLEEP APNEA): Primary | ICD-10-CM

## 2024-12-09 ENCOUNTER — TELEPHONE (OUTPATIENT)
Age: 74
End: 2024-12-09

## 2024-12-09 NOTE — TELEPHONE ENCOUNTER
asking if patient can start on miacalcin nasal spray instead of Forteo for osteoporosis. Please advise, thank you.

## 2024-12-11 LAB

## 2024-12-16 ENCOUNTER — TELEPHONE (OUTPATIENT)
Age: 74
End: 2024-12-16

## 2024-12-16 NOTE — TELEPHONE ENCOUNTER
Patient called to reschedule her annual wellness visit 12/16. Patient's spouse was suppose to take her but hurt his back. Patient also broke her foot 2 weeks ago and is concerned about walking in this weather.     Patient is requesting if she can be reschedule for a soon apt.    Please advise & Assist with scheduling  Thank you

## 2024-12-20 ENCOUNTER — OFFICE VISIT (OUTPATIENT)
Dept: PODIATRY | Facility: CLINIC | Age: 74
End: 2024-12-20
Payer: MEDICARE

## 2024-12-20 ENCOUNTER — APPOINTMENT (OUTPATIENT)
Dept: RADIOLOGY | Facility: MEDICAL CENTER | Age: 74
End: 2024-12-20
Payer: MEDICARE

## 2024-12-20 VITALS
HEIGHT: 61 IN | WEIGHT: 156 LBS | DIASTOLIC BLOOD PRESSURE: 81 MMHG | OXYGEN SATURATION: 98 % | HEART RATE: 80 BPM | BODY MASS INDEX: 29.45 KG/M2 | RESPIRATION RATE: 16 BRPM | TEMPERATURE: 97.8 F | SYSTOLIC BLOOD PRESSURE: 116 MMHG

## 2024-12-20 DIAGNOSIS — S92.351A CLOSED FRACTURE OF BASE OF FIFTH METATARSAL BONE OF RIGHT FOOT: Primary | ICD-10-CM

## 2024-12-20 DIAGNOSIS — S92.351A CLOSED FRACTURE OF BASE OF FIFTH METATARSAL BONE OF RIGHT FOOT: ICD-10-CM

## 2024-12-20 PROCEDURE — 99213 OFFICE O/P EST LOW 20 MIN: CPT | Performed by: PODIATRIST

## 2024-12-20 PROCEDURE — 73630 X-RAY EXAM OF FOOT: CPT

## 2024-12-20 NOTE — PATIENT INSTRUCTIONS
Partial weightbearing to right lower extremity.  Wear cam boot at all times while ambulating.  Maintain nonweightbearing to right foot as much as possible.  Use walker, scooter, or wheelchair at all times to decrease force going through your right foot.  Continue with elevation, compression, and ice well bruising persists

## 2024-12-20 NOTE — PROGRESS NOTES
Name: Shell Mccann      : 1950      MRN: [id number]  Encounter Provider: Wandy Espinosa DPM  Encounter Date: 2024   [facility]  :  Assessment & Plan  Closed fracture of base of fifth metatarsal bone of right foot    Orders:    XR foot 3+ vw right; Future      Imaging Reviewed at this visit (I personally reviewed/independently interpreted images and reports in PACS)  XR right foot nonweightbearing 3v today's date: Minimally displaced fracture at the base of the right fifth metatarsal.  No diastases or changes from last exam.  Trace callus formation  IMPRESSION:  RLE close fifth metatarsal base fracture    PLAN:  Fracture similar to last exam, trace callus formation noted  Patient educated on fifth metatarsal base fracture.  Patient educated on nonunion rates.   Continue with strict immobilization with Cam boot.   limited weightbearing, ADLs only.  Recommended crutches, walker, rolling scooter for ambulation.  Patient has concerns with falling due to immobilization she should use assistive devices as recommended above.  Patient understands that delayed healing, nonunion, diastases of fracture may require surgical intervention or utilization of other treatment modalities such as bone stimulator.  Bone fractures typically take 6 to 8 weeks to heal.  Due to noncompliance with weightbearing and osteoporosis healing time may be longer with a higher risk of complications   Shoe lift can be worn on opposite shoe for balance, not worn at today's evaluation  Instructions were given for conservative care which was also demonstrated during the clinical visit.   Rest, ice, elevate, Tylenol for pain  F/u 4 weeks for serial x-ray    History of Present Illness   HPI  Shell Mccann is a 73 y.o. female who presents for further evaluation management of right fifth metatarsal base fracture.  Patient states she has been compliant with use of cam boot however she has  "walked to the bathroom at night several times without it.  Patient is also using assistive devices for ambulation and trying to avoid unnecessary activity.  She states she has mild pain intermittently in the area but overall pain is improving.  Patient is accompanied by family member at today's evaluation          Review of Systems  Constitutional:  Negative for chills and fever.   Respiratory:  Negative for shortness of breath.    Cardiovascular:  Negative for chest pain and leg swelling.   Musculoskeletal:  Positive for gait problem.          Objective   /81 (Patient Position: Sitting, Cuff Size: Large)   Pulse 80   Temp 97.8 °F (36.6 °C) (Temporal)   Resp 16   Ht 5' 1\" (1.549 m)   Wt 70.8 kg (156 lb)   SpO2 98%   BMI 29.48 kg/m²      Physical Exam    Cardiovascular:      Comments: dP/PT pulse 2/4  CRT brisk  Decreased temperature gradient from knees to digits  Musculoskeletal:         General: Swelling, tenderness and signs of injury present.      Comments: Right foot minimal edema along the dorsal lateral aspect of the right foot  RLE ecchymosis noted dorsal forefoot with significant improvement, lateral rear foot ecchymosis resolved  Tender with palpation over fifth metatarsal styloid process.  Tender with palpation of peroneal tendon at level of insertion.  No pain with minimal active and passive range of motion.  Redness with palpation over ATFL improved pain with  No pain with palpation of fifth metatarsal shaft.  No pain with range of motion of metatarsophalangeal joints   Skin:     Capillary Refill: Capillary refill takes 2 to 3 seconds.      Findings: Bruising present.      Comments: Edema and ecchymosis significantly improved..  No fracture blisters noted, no skin tenting noted       "

## 2024-12-31 LAB
DME PARACHUTE DELIVERY DATE EXPECTED: NORMAL
DME PARACHUTE DELIVERY DATE REQUESTED: NORMAL
DME PARACHUTE ITEM DESCRIPTION: NORMAL
DME PARACHUTE ORDER STATUS: NORMAL
DME PARACHUTE SUPPLIER NAME: NORMAL
DME PARACHUTE SUPPLIER PHONE: NORMAL

## 2025-01-03 ENCOUNTER — NURSE TRIAGE (OUTPATIENT)
Age: 75
End: 2025-01-03

## 2025-01-03 NOTE — TELEPHONE ENCOUNTER
Pt stated Pulm Provider: Dr. Portillo    Actionable item: Home care advice provided    What is the reason for the call/chief complaint?    Pt calling concerned she has pneumonia. Pt has a hx of asthma. Uses twice daily Symbicort and Spiriva for maintenance. Pt c/o cough which started about 3-4 days ago, getting slightly worse as time goes on. Cough is productive for green sputum. Pt was exposed to her 2 year old grandson on 12/25 who was feeling ill at the time and was later diagnosed with pneumonia. Pt denies SOB, fever/chills, chest pain. Explained it is reassuring that she does not have any fevers or increased SOB. Recommended increased fluids, honey, humidifier hs. Will discuss with provider as well and update pt with further recommendations.

## 2025-01-03 NOTE — TELEPHONE ENCOUNTER
"Reason for Disposition  • Cough with no complications    Answer Assessment - Initial Assessment Questions  1. ONSET: \"When did the cough begin?\"       About 3-4 days ago  2. SEVERITY: \"How bad is the cough today?\"       Mild  3. SPUTUM: \"Describe the color of your sputum\" (e.g., none, dry cough; clear, white, yellow, green)      Green  4. HEMOPTYSIS: \"Are you coughing up any blood?\" If Yes, ask: \"How much?\" (e.g., flecks, streaks, tablespoons, etc.)      Denies hemoptysis  5. DIFFICULTY BREATHING: \"Are you having difficulty breathing?\" If Yes, ask: \"How bad is it?\" (e.g., mild, moderate, severe)       Denies SOB  6. FEVER: \"Do you have a fever?\" If Yes, ask: \"What is your temperature, how was it measured, and when did it start?\"      Denies fever  7. CARDIAC HISTORY: \"Do you have any history of heart disease?\" (e.g., heart attack, congestive heart failure)       See chart  8. LUNG HISTORY: \"Do you have any history of lung disease?\"  (e.g., pulmonary embolus, asthma, emphysema)      Asthma  9. PE RISK FACTORS: \"Do you have a history of blood clots?\" (or: recent major surgery, recent prolonged travel, bedridden)      None specified  10. OTHER SYMPTOMS: \"Do you have any other symptoms?\" (e.g., runny nose, wheezing, chest pain)        Fatigued    Protocols used: Cough-Adult-OH    "

## 2025-01-03 NOTE — TELEPHONE ENCOUNTER
Pt called back asking if the provider has any further recommendations. Reviewed previous conversation/recommendations with patient, again reassured that it is good that she is does not have any SOB and/or fever.     Further advised, as did the earlier message, that if the provider has any additional recommendations, we will contact her. Patient had no further questions and/or concerns at this time.

## 2025-01-06 LAB

## 2025-01-08 ENCOUNTER — HOSPITAL ENCOUNTER (OUTPATIENT)
Dept: MAMMOGRAPHY | Facility: HOSPITAL | Age: 75
Discharge: HOME | End: 2025-01-08
Attending: OBSTETRICS & GYNECOLOGY
Payer: MEDICARE

## 2025-01-08 DIAGNOSIS — Z12.31 ENCOUNTER FOR SCREENING MAMMOGRAM FOR BREAST CANCER: ICD-10-CM

## 2025-01-08 PROCEDURE — 77067 SCR MAMMO BI INCL CAD: CPT

## 2025-01-08 PROCEDURE — 77063 BREAST TOMOSYNTHESIS BI: CPT

## 2025-01-14 ENCOUNTER — TELEPHONE (OUTPATIENT)
Age: 75
End: 2025-01-14

## 2025-01-14 DIAGNOSIS — M81.0 OSTEOPOROSIS, UNSPECIFIED OSTEOPOROSIS TYPE, UNSPECIFIED PATHOLOGICAL FRACTURE PRESENCE: Primary | ICD-10-CM

## 2025-01-14 RX ORDER — TERIPARATIDE 250 UG/ML
20 INJECTION, SOLUTION SUBCUTANEOUS DAILY
Qty: 7.2 ML | Refills: 3 | Status: SHIPPED | OUTPATIENT
Start: 2025-01-14 | End: 2026-01-09

## 2025-01-14 RX ORDER — TERIPARATIDE 250 UG/ML
20 INJECTION, SOLUTION SUBCUTANEOUS DAILY
Qty: 2.4 ML | Refills: 5 | Status: SHIPPED | OUTPATIENT
Start: 2025-01-14 | End: 2025-01-14

## 2025-01-14 NOTE — TELEPHONE ENCOUNTER
Russell calling back to let us know that they actually want to charge them more for the 600mcg. He is asking if the 620mcg of the teriparatide can be resent. He apologizes for the inconvenience.

## 2025-01-14 NOTE — TELEPHONE ENCOUNTER
Russell calling in regards to the teriparatide 620mcg being $995 a month. He is asking if the 600mcg can be ordered as this will be saving them $400 more a month? Please advise and call Russell to let him know if this is possible? He can be reached at [phone].

## 2025-01-16 ENCOUNTER — RESULTS FOLLOW-UP (OUTPATIENT)
Dept: LABOR AND DELIVERY | Facility: HOSPITAL | Age: 75
End: 2025-01-16

## 2025-01-17 ENCOUNTER — APPOINTMENT (OUTPATIENT)
Dept: RADIOLOGY | Facility: MEDICAL CENTER | Age: 75
End: 2025-01-17
Payer: MEDICARE

## 2025-01-17 ENCOUNTER — OFFICE VISIT (OUTPATIENT)
Dept: PODIATRY | Facility: CLINIC | Age: 75
End: 2025-01-17
Payer: MEDICARE

## 2025-01-17 VITALS
TEMPERATURE: 97.7 F | BODY MASS INDEX: 29.45 KG/M2 | RESPIRATION RATE: 16 BRPM | WEIGHT: 156 LBS | HEIGHT: 61 IN | HEART RATE: 80 BPM | OXYGEN SATURATION: 98 %

## 2025-01-17 DIAGNOSIS — S92.351A CLOSED FRACTURE OF BASE OF FIFTH METATARSAL BONE OF RIGHT FOOT: ICD-10-CM

## 2025-01-17 DIAGNOSIS — S92.351A CLOSED FRACTURE OF BASE OF FIFTH METATARSAL BONE OF RIGHT FOOT: Primary | ICD-10-CM

## 2025-01-17 PROCEDURE — 99213 OFFICE O/P EST LOW 20 MIN: CPT | Performed by: PODIATRIST

## 2025-01-17 PROCEDURE — 73630 X-RAY EXAM OF FOOT: CPT

## 2025-01-17 NOTE — PROGRESS NOTES
Name: Shell Mccann      : 1950      MRN: [id number]  Encounter Provider: Wandy Espinosa DPM  Encounter Date: 2025   [facility]  :  Assessment & Plan  Closed fracture of base of fifth metatarsal bone of right foot    Orders:    X-ray foot right 3+ views; Future    Imaging Reviewed at this visit (I personally reviewed/independently interpreted images and reports in PACS)  XR right foot nonweightbearing 3v 24 date: Minimally displaced fracture at the base of the right fifth metatarsal.  No diastases or changes from last exam.  Trace callus formation  XR right foot nonweightbearing 3v 25 date: Minimally displaced fracture at the base of the right fifth metatarsal.  No diastases or changes from last exam.  Trace callus formation, no significant consolidation noted yet    IMPRESSION:  RLE close fifth metatarsal base fracture     PLAN:  Fracture similar to last exam, trace callus formation noted, date of injury approximately 2024  Fracture care was delayed and patient has been immobilized for 5 weeks  X-rays at today's evaluation similar to last exam however trace callusing appreciated  Patient educated on fifth metatarsal base fracture.  Patient educated on nonunion rates.   Continue with strict immobilization with Cam boot.   limited weightbearing, ADLs only.  Recommended crutches, walker, rolling scooter for ambulation.  Patient has concerns with falling due to immobilization she should use assistive devices as recommended above.  Patient understands that delayed healing, nonunion, diastases of fracture may require surgical intervention or utilization of other treatment modalities such as bone stimulator.  Bone fractures typically take 6 to 8 weeks to heal.  Due to noncompliance with weightbearing and osteoporosis healing time may be longer with a higher risk of complications   Shoe lift can be worn on opposite shoe for balance, not worn at  "today's evaluation  Instructions were given for conservative care which was also demonstrated during the clinical visit.   Rest, ice, elevate, Tylenol for pain  Patient counseled on utilizing vitamin D, high-protein diet, and calcium intake.  F/u 4 weeks for serial x-ray    History of Present Illness   HPI  Shell Mccann is a 74 y.o. female who presents presents for further fracture management of right fifth metatarsal base fracture.  Patient states pain has completely resolved.  She has no swelling or bruising remaining.  She has been adherent to use of cam boot and has tried to minimize activity.  She presents today with her  for further evaluation and management      Review of Systems  Constitutional:  Negative for chills and fever.   Respiratory:  Negative for shortness of breath.    Cardiovascular:  Negative for chest pain and leg swelling.   Musculoskeletal:  Positive for gait problem.        Objective   Resp 16   Ht 5' 1\" (1.549 m)   Wt 70.8 kg (156 lb)   BMI 29.48 kg/m²      Physical Exam  Comments: dP/PT pulse 2/4  CRT brisk  Decreased temperature gradient from knees to digits  Musculoskeletal:         General: No pain with palpation of right fifth metatarsal base or shaft.  No pain with active plantarflexion, dorsiflexion, inversion, eversion.  Edema and ecchymosis completely resolved  Skin:     Capillary Refill: Capillary refill takes 2 to 3 seconds.      Findings: Bruising present.      Comments: Edema and ecchymosis significantly improved..  No fracture blisters noted, no skin tenting noted       "

## 2025-01-27 ENCOUNTER — OFFICE VISIT (OUTPATIENT)
Dept: INTERNAL MEDICINE CLINIC | Facility: CLINIC | Age: 75
End: 2025-01-27
Payer: MEDICARE

## 2025-01-27 VITALS
HEART RATE: 79 BPM | TEMPERATURE: 98.4 F | BODY MASS INDEX: 28.13 KG/M2 | OXYGEN SATURATION: 95 % | WEIGHT: 149 LBS | HEIGHT: 61 IN | DIASTOLIC BLOOD PRESSURE: 72 MMHG | SYSTOLIC BLOOD PRESSURE: 128 MMHG

## 2025-01-27 DIAGNOSIS — E78.00 HYPERCHOLESTEROLEMIA: Primary | ICD-10-CM

## 2025-01-27 DIAGNOSIS — Z13.0 SCREENING, ANEMIA, DEFICIENCY, IRON: ICD-10-CM

## 2025-01-27 DIAGNOSIS — Z00.00 MEDICARE ANNUAL WELLNESS VISIT, SUBSEQUENT: ICD-10-CM

## 2025-01-27 PROCEDURE — G0439 PPPS, SUBSEQ VISIT: HCPCS | Performed by: INTERNAL MEDICINE

## 2025-01-27 NOTE — PROGRESS NOTES
Name: Shell Mccann      : 1950      MRN: [id number]  Encounter Provider: Abhishek Martinez DO  Encounter Date: 2025   Encounter department: [facility]    Assessment & Plan       Preventive health issues were discussed with patient, and age appropriate screening tests were ordered as noted in patient's After Visit Summary. Personalized health advice and appropriate referrals for health education or preventive services given if needed, as noted in patient's After Visit Summary.    History of Present Illness     HPI   Patient Care Team:  Abhishek Martinez DO as PCP - General (Internal Medicine)  Pete Madrigal DO as PCP - Endocrinology (Endocrinology)  Wandy Espinosa DPM (Podiatry)    Review of Systems   Constitutional:  Negative for chills, fatigue and fever.   HENT: Negative.     Respiratory:  Negative for cough, chest tightness and shortness of breath.    Cardiovascular:  Negative for chest pain, palpitations and leg swelling.   Gastrointestinal:  Negative for abdominal pain, constipation, diarrhea, nausea and vomiting.   Genitourinary: Negative.    Musculoskeletal:  Negative for arthralgias, back pain and myalgias.   Skin: Negative.    Neurological: Negative.    Psychiatric/Behavioral: Negative.       Medical History Reviewed by provider this encounter:       Annual Wellness Visit Questionnaire   Shell is here for her Subsequent Wellness visit. Last Medicare Wellness visit information reviewed, patient interviewed, no change since last AWV.     Health Risk Assessment:   Patient rates overall health as good. Patient feels that their physical health rating is slightly worse. Patient is very dissatisfied with their life. Eyesight was rated as same. Hearing was rated as same. Patient feels that their emotional and mental health rating is slightly worse. Patients states they are never, rarely angry. Patient states they are never, rarely unusually tired/fatigued. Pain  experienced in the last 7 days has been none. Patient states that she has experienced no weight loss or gain in last 6 months.     Depression Screening:   PHQ-2 Score: 2      Fall Risk Screening:   In the past year, patient has experienced: history of falling in past year    Number of falls: 2 or more  Injured during fall?: Yes    Feels unsteady when standing or walking?: Yes    Worried about falling?: Yes      Urinary Incontinence Screening:   Patient has not leaked urine accidently in the last six months.     Home Safety:  Patient has trouble with stairs inside or outside of their home. Patient has working smoke alarms and has working carbon monoxide detector. Home safety hazards include: none.     Nutrition:   Current diet is Regular.     Medications:   Patient is not currently taking any over-the-counter supplements. Patient is able to manage medications.     Activities of Daily Living (ADLs)/Instrumental Activities of Daily Living (IADLs):   Walk and transfer into and out of bed and chair?: Yes  Dress and groom yourself?: Yes    Bathe or shower yourself?: Yes    Feed yourself? Yes  Do your laundry/housekeeping?: Yes  Manage your money, pay your bills and track your expenses?: Yes  Make your own meals?: Yes    Do your own shopping?: Yes    Previous Hospitalizations:   Any hospitalizations or ED visits within the last 12 months?: Yes    How many hospitalizations have you had in the last year?: 1-2    Advance Care Planning:   Living will: Yes    Durable POA for healthcare: No    Advanced directive: Yes    Advanced directive counseling given: No    Five wishes given: No    Patient declined ACP directive: No      Cognitive Screening:   Provider or family/friend/caregiver concerned regarding cognition?: No    PREVENTIVE SCREENINGS      Cardiovascular Screening:    General: Screening Not Indicated and History Lipid Disorder      Diabetes Screening:     General: Screening Current      Colorectal Cancer Screening:      General: Screening Current      Breast Cancer Screening:     General: Screening Current      Cervical Cancer Screening:    General: Screening Not Indicated      Osteoporosis Screening:    General: Screening Not Indicated and History Osteoporosis      Abdominal Aortic Aneurysm (AAA) Screening:        General: Screening Not Indicated      Lung Cancer Screening:     General: Screening Not Indicated      Hepatitis C Screening:    General: Screening Current    Screening, Brief Intervention, and Referral to Treatment (SBIRT)    Screening    Typical number of drinks in a week: 0    Single Item Drug Screening:  How often have you used an illegal drug (including marijuana) or a prescription medication for non-medical reasons in the past year? never    Single Item Drug Screen Score: 0  Interpretation: Negative screen for possible drug use disorder    Social Drivers of Health     Financial Resource Strain: Low Risk  (7/28/2023)    Overall Financial Resource Strain (CARDIA)     Difficulty of Paying Living Expenses: Not very hard   Transportation Needs: No Transportation Needs (7/28/2023)    PRAPARE - Transportation     Lack of Transportation (Medical): No     Lack of Transportation (Non-Medical): No     No results found.    Objective   There were no vitals taken for this visit.    Physical Exam  Vitals and nursing note reviewed.   Constitutional:       General: She is not in acute distress.     Appearance: She is well-developed.   HENT:      Head: Normocephalic and atraumatic.   Eyes:      Conjunctiva/sclera: Conjunctivae normal.   Cardiovascular:      Rate and Rhythm: Normal rate and regular rhythm.      Heart sounds: No murmur heard.  Pulmonary:      Effort: Pulmonary effort is normal. No respiratory distress.      Breath sounds: Normal breath sounds.   Abdominal:      Palpations: Abdomen is soft.      Tenderness: There is no abdominal tenderness.   Musculoskeletal:         General: No swelling.      Cervical back: Neck  supple.   Skin:     General: Skin is warm and dry.      Capillary Refill: Capillary refill takes less than 2 seconds.   Neurological:      Mental Status: She is alert.   Psychiatric:         Mood and Affect: Mood normal.

## 2025-01-27 NOTE — PATIENT INSTRUCTIONS
Medicare Preventive Visit Patient Instructions  Thank you for completing your Welcome to Medicare Visit or Medicare Annual Wellness Visit today. Your next wellness visit will be due in one year (1/28/2026).  The screening/preventive services that you may require over the next 5-10 years are detailed below. Some tests may not apply to you based off risk factors and/or age. Screening tests ordered at today's visit but not completed yet may show as past due. Also, please note that scanned in results may not display below.  Preventive Screenings:  Service Recommendations Previous Testing/Comments   Colorectal Cancer Screening  * Colonoscopy    * Fecal Occult Blood Test (FOBT)/Fecal Immunochemical Test (FIT)  * Fecal DNA/Cologuard Test  * Flexible Sigmoidoscopy Age: 45-75 years old   Colonoscopy: every 10 years (may be performed more frequently if at higher risk)  OR  FOBT/FIT: every 1 year  OR  Cologuard: every 3 years  OR  Sigmoidoscopy: every 5 years  Screening may be recommended earlier than age 45 if at higher risk for colorectal cancer. Also, an individualized decision between you and your healthcare provider will decide whether screening between the ages of 76-85 would be appropriate. Colonoscopy: 07/17/2024  FOBT/FIT: Not on file  Cologuard: Not on file  Sigmoidoscopy: Not on file    Screening Current     Breast Cancer Screening Age: 40+ years old  Frequency: every 1-2 years  Not required if history of left and right mastectomy Mammogram: 01/08/2025    Screening Current   Cervical Cancer Screening Between the ages of 21-29, pap smear recommended once every 3 years.   Between the ages of 30-65, can perform pap smear with HPV co-testing every 5 years.   Recommendations may differ for women with a history of total hysterectomy, cervical cancer, or abnormal pap smears in past. Pap Smear: 06/14/2024    Screening Not Indicated   Hepatitis C Screening Once for adults born between 1945 and 1965  More frequently in  patients at high risk for Hepatitis C Hep C Antibody: 07/21/2020    Screening Current   Diabetes Screening 1-2 times per year if you're at risk for diabetes or have pre-diabetes Fasting glucose: 90 mg/dL (4/30/2024)  A1C: 5.7 % (4/30/2024)  Screening Current   Cholesterol Screening Once every 5 years if you don't have a lipid disorder. May order more often based on risk factors. Lipid panel: 04/30/2024    Screening Not Indicated  History Lipid Disorder     Other Preventive Screenings Covered by Medicare:  Abdominal Aortic Aneurysm (AAA) Screening: covered once if your at risk. You're considered to be at risk if you have a family history of AAA.  Lung Cancer Screening: covers low dose CT scan once per year if you meet all of the following conditions: (1) Age 55-77; (2) No signs or symptoms of lung cancer; (3) Current smoker or have quit smoking within the last 15 years; (4) You have a tobacco smoking history of at least 20 pack years (packs per day multiplied by number of years you smoked); (5) You get a written order from a healthcare provider.  Glaucoma Screening: covered annually if you're considered high risk: (1) You have diabetes OR (2) Family history of glaucoma OR (3)  aged 50 and older OR (4)  American aged 65 and older  Osteoporosis Screening: covered every 2 years if you meet one of the following conditions: (1) You're estrogen deficient and at risk for osteoporosis based off medical history and other findings; (2) Have a vertebral abnormality; (3) On glucocorticoid therapy for more than 3 months; (4) Have primary hyperparathyroidism; (5) On osteoporosis medications and need to assess response to drug therapy.   Last bone density test (DXA Scan): 10/15/2024.  HIV Screening: covered annually if you're between the age of 15-65. Also covered annually if you are younger than 15 and older than 65 with risk factors for HIV infection. For pregnant patients, it is covered up to 3 times per  pregnancy.    Immunizations:  Immunization Recommendations   Influenza Vaccine Annual influenza vaccination during flu season is recommended for all persons aged >= 6 months who do not have contraindications   Pneumococcal Vaccine   * Pneumococcal conjugate vaccine = PCV13 (Prevnar 13), PCV15 (Vaxneuvance), PCV20 (Prevnar 20)  * Pneumococcal polysaccharide vaccine = PPSV23 (Pneumovax) Adults 19-63 yo with certain risk factors or if 65+ yo  If never received any pneumonia vaccine: recommend Prevnar 20 (PCV20)  Give PCV20 if previously received 1 dose of PCV13 or PPSV23   Hepatitis B Vaccine 3 dose series if at intermediate or high risk (ex: diabetes, end stage renal disease, liver disease)   Respiratory syncytial virus (RSV) Vaccine - COVERED BY MEDICARE PART D  * RSVPreF3 (Arexvy) CDC recommends that adults 60 years of age and older may receive a single dose of RSV vaccine using shared clinical decision-making (SCDM)   Tetanus (Td) Vaccine - COST NOT COVERED BY MEDICARE PART B Following completion of primary series, a booster dose should be given every 10 years to maintain immunity against tetanus. Td may also be given as tetanus wound prophylaxis.   Tdap Vaccine - COST NOT COVERED BY MEDICARE PART B Recommended at least once for all adults. For pregnant patients, recommended with each pregnancy.   Shingles Vaccine (Shingrix) - COST NOT COVERED BY MEDICARE PART B  2 shot series recommended in those 19 years and older who have or will have weakened immune systems or those 50 years and older     Health Maintenance Due:      Topic Date Due   • Breast Cancer Screening: Mammogram  01/08/2027   • Hepatitis C Screening  Completed   • Colorectal Cancer Screening  Discontinued     Immunizations Due:      Topic Date Due   • COVID-19 Vaccine (1 - 2024-25 season) Never done     Advance Directives   What are advance directives?  Advance directives are legal documents that state your wishes and plans for medical care. These plans  are made ahead of time in case you lose your ability to make decisions for yourself. Advance directives can apply to any medical decision, such as the treatments you want, and if you want to donate organs.   What are the types of advance directives?  There are many types of advance directives, and each state has rules about how to use them. You may choose a combination of any of the following:  Living will:  This is a written record of the treatment you want. You can also choose which treatments you do not want, which to limit, and which to stop at a certain time. This includes surgery, medicine, IV fluid, and tube feedings.   Durable power of  for healthcare (DPAHC):  This is a written record that states who you want to make healthcare choices for you when you are unable to make them for yourself. This person, called a proxy, is usually a family member or a friend. You may choose more than 1 proxy.  Do not resuscitate (DNR) order:  A DNR order is used in case your heart stops beating or you stop breathing. It is a request not to have certain forms of treatment, such as CPR. A DNR order may be included in other types of advance directives.  Medical directive:  This covers the care that you want if you are in a coma, near death, or unable to make decisions for yourself. You can list the treatments you want for each condition. Treatment may include pain medicine, surgery, blood transfusions, dialysis, IV or tube feedings, and a ventilator (breathing machine).  Values history:  This document has questions about your views, beliefs, and how you feel and think about life. This information can help others choose the care that you would choose.  Why are advance directives important?  An advance directive helps you control your care. Although spoken wishes may be used, it is better to have your wishes written down. Spoken wishes can be misunderstood, or not followed. Treatments may be given even if you do not want  them. An advance directive may make it easier for your family to make difficult choices about your care.   Fall Prevention    Fall prevention  includes ways to make your home and other areas safer. It also includes ways you can move more carefully to prevent a fall. Health conditions that cause changes in your blood pressure, vision, or muscle strength and coordination may increase your risk for falls. Medicines may also increase your risk for falls if they make you dizzy, weak, or sleepy.   Fall prevention tips:   Stand or sit up slowly.    Use assistive devices as directed.    Wear shoes that fit well and have soles that .    Wear a personal alarm.    Stay active.    Manage your medical conditions.    Home Safety Tips:  Add items to prevent falls in the bathroom.    Keep paths clear.    Install bright lights in your home.    Keep items you use often on shelves within reach.    Paint or place reflective tape on the edges of your stairs.    Weight Management   Why it is important to manage your weight:  Being overweight increases your risk of health conditions such as heart disease, high blood pressure, type 2 diabetes, and certain types of cancer. It can also increase your risk for osteoarthritis, sleep apnea, and other respiratory problems. Aim for a slow, steady weight loss. Even a small amount of weight loss can lower your risk of health problems.  How to lose weight safely:  A safe and healthy way to lose weight is to eat fewer calories and get regular exercise. You can lose up about 1 pound a week by decreasing the number of calories you eat by 500 calories each day.   Healthy meal plan for weight management:  A healthy meal plan includes a variety of foods, contains fewer calories, and helps you stay healthy. A healthy meal plan includes the following:  Eat whole-grain foods more often.  A healthy meal plan should contain fiber. Fiber is the part of grains, fruits, and vegetables that is not broken down  by your body. Whole-grain foods are healthy and provide extra fiber in your diet. Some examples of whole-grain foods are whole-wheat breads and pastas, oatmeal, brown rice, and bulgur.  Eat a variety of vegetables every day.  Include dark, leafy greens such as spinach, kale, cherie greens, and mustard greens. Eat yellow and orange vegetables such as carrots, sweet potatoes, and winter squash.   Eat a variety of fruits every day.  Choose fresh or canned fruit (canned in its own juice or light syrup) instead of juice. Fruit juice has very little or no fiber.  Eat low-fat dairy foods.  Drink fat-free (skim) milk or 1% milk. Eat fat-free yogurt and low-fat cottage cheese. Try low-fat cheeses such as mozzarella and other reduced-fat cheeses.  Choose meat and other protein foods that are low in fat.  Choose beans or other legumes such as split peas or lentils. Choose fish, skinless poultry (chicken or turkey), or lean cuts of red meat (beef or pork). Before you cook meat or poultry, cut off any visible fat.   Use less fat and oil.  Try baking foods instead of frying them. Add less fat, such as margarine, sour cream, regular salad dressing and mayonnaise to foods. Eat fewer high-fat foods. Some examples of high-fat foods include french fries, doughnuts, ice cream, and cakes.  Eat fewer sweets.  Limit foods and drinks that are high in sugar. This includes candy, cookies, regular soda, and sweetened drinks.  Exercise:  Exercise at least 30 minutes per day on most days of the week. Some examples of exercise include walking, biking, dancing, and swimming. You can also fit in more physical activity by taking the stairs instead of the elevator or parking farther away from stores. Ask your healthcare provider about the best exercise plan for you.      © Copyright [employer] 2018 Information is for End User's use only and may not be sold, redistributed or otherwise used for commercial purposes. All illustrations and images  included in CareNotes® are the copyrighted property of [email or web address] or [employer]
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
## 2025-02-03 ENCOUNTER — OFFICE VISIT (OUTPATIENT)
Dept: PULMONOLOGY | Facility: CLINIC | Age: 75
End: 2025-02-03
Payer: MEDICARE

## 2025-02-03 VITALS
BODY MASS INDEX: 27.94 KG/M2 | WEIGHT: 148 LBS | RESPIRATION RATE: 18 BRPM | SYSTOLIC BLOOD PRESSURE: 110 MMHG | HEART RATE: 62 BPM | TEMPERATURE: 98.8 F | DIASTOLIC BLOOD PRESSURE: 68 MMHG | OXYGEN SATURATION: 93 % | HEIGHT: 61 IN

## 2025-02-03 DIAGNOSIS — G47.33 OBSTRUCTIVE SLEEP APNEA: ICD-10-CM

## 2025-02-03 DIAGNOSIS — J45.40 MODERATE PERSISTENT ASTHMA WITHOUT COMPLICATION: Primary | ICD-10-CM

## 2025-02-03 PROCEDURE — 99214 OFFICE O/P EST MOD 30 MIN: CPT | Performed by: INTERNAL MEDICINE

## 2025-02-14 ENCOUNTER — OFFICE VISIT (OUTPATIENT)
Dept: PODIATRY | Facility: CLINIC | Age: 75
End: 2025-02-14
Payer: MEDICARE

## 2025-02-14 ENCOUNTER — APPOINTMENT (OUTPATIENT)
Dept: RADIOLOGY | Facility: MEDICAL CENTER | Age: 75
End: 2025-02-14
Payer: MEDICARE

## 2025-02-14 VITALS
RESPIRATION RATE: 16 BRPM | BODY MASS INDEX: 27.94 KG/M2 | HEART RATE: 72 BPM | TEMPERATURE: 97.7 F | OXYGEN SATURATION: 97 % | WEIGHT: 148 LBS | HEIGHT: 61 IN

## 2025-02-14 DIAGNOSIS — S92.351A CLOSED FRACTURE OF BASE OF FIFTH METATARSAL BONE OF RIGHT FOOT: ICD-10-CM

## 2025-02-14 DIAGNOSIS — S92.351K CLOSED DISPLACED FRACTURE OF FIFTH METATARSAL BONE OF RIGHT FOOT WITH NONUNION, SUBSEQUENT ENCOUNTER: Primary | ICD-10-CM

## 2025-02-14 PROCEDURE — 99213 OFFICE O/P EST LOW 20 MIN: CPT | Performed by: PODIATRIST

## 2025-02-14 PROCEDURE — 73630 X-RAY EXAM OF FOOT: CPT

## 2025-02-14 NOTE — PROGRESS NOTES
Name: Shell Mccann      : 1950      MRN: [id number]  Encounter Provider: Wandy Espinosa DPM  Encounter Date: 2025   [facility]  :  Assessment & Plan  Closed fracture of base of fifth metatarsal bone of right foot    Orders:    X-ray foot right 3+ views; Future    Osteogenesic Stimulator      Imaging Reviewed at this visit (I personally reviewed/independently interpreted images and reports in PACS)  XR right foot nonweightbearing 3v 24 date: Minimally displaced fracture at the base of the right fifth metatarsal.  No diastases or changes from last exam.  Trace callus formation  XR right foot nonweightbearing 3v 25 date: Minimally displaced fracture at the base of the right fifth metatarsal.  No diastases or changes from last exam.  Trace callus formation, no significant consolidation noted yet  XR right foot nonweightbearing 3v 2025 date: No diastases from last exam.  No significant consolidation noted yet.  Mild callusing appreciated      IMPRESSION:  RLE close fifth metatarsal base fracture     PLAN:  Fracture with continued slow improvement, no significant consolidation noted  Patient is asymptomatic with clinical exam, no pain reported  Continue with strict immobilization with Cam boot.   limited weightbearing, ADLs only.  Recommended crutches, walker, rolling scooter for ambulation.  Patient has concerns with falling due to immobilization she should use assistive devices as recommended above.  Patient understands that delayed healing, nonunion, diastases of fracture may require surgical intervention or utilization of other treatment modalities such as bone stimulator.   due to noncompliance with weightbearing and osteoporosis healing time may be longer with a higher risk of complications.  Patient approximately 12 weeks from injury  Shoe lift can be worn on opposite shoe for balance, not worn at today's evaluation  Patient counseled  "on utilizing vitamin D, high-protein diet, and calcium intake.  Rx bone stimulator for delayed fracture healing, approximately 3 months from injury documented in the ED note 11/27/2024  Patient will follow-up in 4 weeks for repeat evaluation and serial x-ray    History of Present Illness   HPI  Shell Mccann is a 74 y.o. female who presents for continued evaluation and management of right fifth metatarsal base fracture.  Patient states she is trying to be compliant with weightbearing restrictions and is wearing her cam boot as much as possible.  She denies any pain at all in the area of injury.  She presents today for further evaluation and management      Review of Systems  Constitutional:  Negative for chills and fever.   Respiratory:  Negative for shortness of breath.    Cardiovascular:  Negative for chest pain and leg swelling.   Musculoskeletal:  Positive for gait problem.        Objective   Pulse 72   Temp 97.7 °F (36.5 °C) (Temporal)   Resp 16   Ht 5' 1\" (1.549 m)   Wt 67.1 kg (148 lb)   SpO2 97%   BMI 27.96 kg/m²      Physical Exam  Comments: dP/PT pulse 2/4  CRT brisk  Decreased temperature gradient from knees to digits  Musculoskeletal:         General: No pain with palpation of right fifth metatarsal base or shaft.  No pain with palpation along peroneals.  No pain with active plantarflexion, dorsiflexion, inversion, eversion.  No edema  Skin:     Capillary Refill: Capillary refill takes 2 to 3 seconds.         Comments: Skin intact      "

## 2025-02-17 PROBLEM — S92.351K CLOSED DISPLACED FRACTURE OF FIFTH METATARSAL BONE OF RIGHT FOOT WITH NONUNION, SUBSEQUENT ENCOUNTER: Status: ACTIVE | Noted: 2025-02-17

## 2025-02-19 ENCOUNTER — TELEPHONE (OUTPATIENT)
Age: 75
End: 2025-02-19

## 2025-02-19 NOTE — TELEPHONE ENCOUNTER
Caller: Shell Mccann    Doctor: Dr. Espinosa / Mani    Reason for call: Shell is calling about the bone stimulator that Dr. Espinosa said she needed.  Can somebody please reach out to her regarding this?  Thank you.     Call back#: [phone]

## 2025-02-24 DIAGNOSIS — G43.809 OTHER MIGRAINE WITHOUT STATUS MIGRAINOSUS, NOT INTRACTABLE: ICD-10-CM

## 2025-02-25 RX ORDER — BUTALBITAL, ACETAMINOPHEN AND CAFFEINE 50; 325; 40 MG/1; MG/1; MG/1
1 TABLET ORAL EVERY 6 HOURS PRN
Qty: 60 TABLET | Refills: 1 | Status: SHIPPED | OUTPATIENT
Start: 2025-02-25

## 2025-02-26 ENCOUNTER — TELEPHONE (OUTPATIENT)
Dept: PODIATRY | Facility: CLINIC | Age: 75
End: 2025-02-26

## 2025-02-27 DIAGNOSIS — S92.351K CLOSED DISPLACED FRACTURE OF FIFTH METATARSAL BONE OF RIGHT FOOT WITH NONUNION, SUBSEQUENT ENCOUNTER: Primary | ICD-10-CM

## 2025-03-06 ENCOUNTER — DOCUMENTATION (OUTPATIENT)
Dept: OBGYN CLINIC | Facility: CLINIC | Age: 75
End: 2025-03-06

## 2025-03-06 ENCOUNTER — TELEPHONE (OUTPATIENT)
Age: 75
End: 2025-03-06

## 2025-03-06 NOTE — TELEPHONE ENCOUNTER
Caller: Shell    Doctor: Dr. Espinosa    Reason for call: Patient has a broken foot and has been wearing the walking boot since Nov. About a week ago she started experiencing hip pain on the same said that is getting a little worse everyday. Wondering if this could be from wearing the boot for so long? And if you have recommendations       Call back#: [phone]

## 2025-03-06 NOTE — PROGRESS NOTES
3/6 I called patient and lmom.  I have an approval for the bone stimulator.  I am available Friday 3/7 for a fitting    3/7 Patient was fitted with bone stimulator

## 2025-03-14 ENCOUNTER — OFFICE VISIT (OUTPATIENT)
Dept: PODIATRY | Facility: CLINIC | Age: 75
End: 2025-03-14
Payer: MEDICARE

## 2025-03-14 ENCOUNTER — APPOINTMENT (OUTPATIENT)
Dept: RADIOLOGY | Facility: MEDICAL CENTER | Age: 75
End: 2025-03-14
Payer: MEDICARE

## 2025-03-14 VITALS
HEART RATE: 83 BPM | OXYGEN SATURATION: 96 % | RESPIRATION RATE: 16 BRPM | HEIGHT: 61 IN | TEMPERATURE: 98.5 F | BODY MASS INDEX: 27.94 KG/M2 | WEIGHT: 148 LBS

## 2025-03-14 DIAGNOSIS — S92.351A CLOSED FRACTURE OF BASE OF FIFTH METATARSAL BONE OF RIGHT FOOT: ICD-10-CM

## 2025-03-14 DIAGNOSIS — S92.351A CLOSED FRACTURE OF BASE OF FIFTH METATARSAL BONE OF RIGHT FOOT: Primary | ICD-10-CM

## 2025-03-14 PROCEDURE — 73630 X-RAY EXAM OF FOOT: CPT

## 2025-03-14 PROCEDURE — 99213 OFFICE O/P EST LOW 20 MIN: CPT | Performed by: PODIATRIST

## 2025-03-14 NOTE — PROGRESS NOTES
Full healing noted, WBAT in short leg cast, follow-up 1 month for repeat x-ray   Name: Shell Mccann      : 1950      MRN: [id number]  Encounter Provider: Wandy Espinosa DPM  Encounter Date: 3/14/2025   [facility]  :  Assessment & Plan  Closed fracture of base of fifth metatarsal bone of right foot    Orders:    XR foot 3+ vw right; Future      Imaging Reviewed at this visit (I personally reviewed/independently interpreted images and reports in PACS)  XR right foot nonweightbearing 3v 24 date: Minimally displaced fracture at the base of the right fifth metatarsal.  No diastases or changes from last exam.  Trace callus formation  XR right foot nonweightbearing 3v 25 date: Minimally displaced fracture at the base of the right fifth metatarsal.  No diastases or changes from last exam.  Trace callus formation, no significant consolidation noted yet  XR right foot nonweightbearing 3v 2025 date: No diastases from last exam.  No significant consolidation noted yet.  Mild callusing appreciated  XR right foot nonweightbearing 3v 3/14/2025 date: No diastases from last exam. Interval healing noted.        IMPRESSION:  RLE close fifth metatarsal base fracture     PLAN:  RLE 5th met fracture with interval healing now noted   Patient is asymptomatic with clinical exam, no pain reported  WBAT to RLE in CAM, ADLs only.  Use assistive devices as needed.   Due to noncompliance with weightbearing and osteoporosis healing time may be longer with a higher risk of complications.    Continue with bone stimulator  Shoe lift can be worn on opposite shoe for balance, not worn at today's evaluation  Patient counseled on utilizing vitamin D, high-protein diet, and calcium intake.  Now with significant interval healing noted we will consider transitioning out of cam boot pending next x-ray  Patient will follow-up in 4 weeks for repeat evaluation and serial  "x-ray    History of Present Illness   HPI  Shell Mccann is a 74 y.o. female who presents for continued evaluation and management of right lower extremity fifth metatarsal base fracture fifth delayed healing.  Patient received her bone stimulator and has initiated use for over a week.  She has been wearing her cam boot for ambulation and a walker for assistance.  Patient is saying she is starting to get hip pain.  She does not like wearing shoe lift on contralateral limb.  Patient denies any pain or irritation in the area.  She states overall she feels good            Review of Systems  Constitutional:  Negative for chills and fever.   Respiratory:  Negative for shortness of breath.    Cardiovascular:  Negative for chest pain and leg swelling.   Musculoskeletal:  Positive for gait problem.          Objective   Pulse 83   Temp 98.5 °F (36.9 °C) (Temporal)   Resp 16   Ht 5' 1\" (1.549 m)   Wt 67.1 kg (148 lb)   SpO2 96%   BMI 27.96 kg/m²      Physical Exam  Comments: dP/PT pulse 2/4  CRT brisk  Decreased temperature gradient from knees to digits  Musculoskeletal:         General: No pain with palpation of right fifth metatarsal base or shaft.  No pain with palpation along peroneals.  No pain with active plantarflexion, dorsiflexion, inversion, eversion.  No edema  Skin:     Capillary Refill: Capillary refill takes 2 to 3 seconds.         Comments: Skin intact      "

## 2025-04-02 ENCOUNTER — TELEPHONE (OUTPATIENT)
Age: 75
End: 2025-04-02

## 2025-04-02 DIAGNOSIS — S90.519A: Primary | ICD-10-CM

## 2025-04-02 RX ORDER — MUPIROCIN 20 MG/G
OINTMENT TOPICAL DAILY
Qty: 22 G | Refills: 0 | Status: SHIPPED | OUTPATIENT
Start: 2025-04-02

## 2025-04-02 NOTE — TELEPHONE ENCOUNTER
Caller: Shell     Doctor and/or Office: Dr. Jesús AMEZCUA#: [phone]     Escalation: Care Patient has a open  sore on her ankle from the boot and she is very concerned about it.  She did put pictures in my chart.  She asked if someone could please call her back Thank you

## 2025-04-03 ENCOUNTER — OFFICE VISIT (OUTPATIENT)
Dept: PULMONOLOGY | Facility: CLINIC | Age: 75
End: 2025-04-03
Payer: MEDICARE

## 2025-04-03 VITALS
HEART RATE: 86 BPM | DIASTOLIC BLOOD PRESSURE: 88 MMHG | HEIGHT: 61 IN | TEMPERATURE: 97.8 F | BODY MASS INDEX: 27.96 KG/M2 | SYSTOLIC BLOOD PRESSURE: 138 MMHG | OXYGEN SATURATION: 95 %

## 2025-04-03 DIAGNOSIS — G47.61 PLMD (PERIODIC LIMB MOVEMENT DISORDER): ICD-10-CM

## 2025-04-03 DIAGNOSIS — J45.40 MODERATE PERSISTENT ASTHMA WITHOUT COMPLICATION: Primary | ICD-10-CM

## 2025-04-03 DIAGNOSIS — R06.2 WHEEZING: ICD-10-CM

## 2025-04-03 DIAGNOSIS — G47.33 OBSTRUCTIVE SLEEP APNEA: ICD-10-CM

## 2025-04-03 PROCEDURE — 99214 OFFICE O/P EST MOD 30 MIN: CPT | Performed by: INTERNAL MEDICINE

## 2025-04-03 RX ORDER — PREDNISONE 20 MG/1
TABLET ORAL
Qty: 19 TABLET | Refills: 0 | Status: SHIPPED | OUTPATIENT
Start: 2025-04-03 | End: 2025-04-16

## 2025-04-03 RX ORDER — BUDESONIDE AND FORMOTEROL FUMARATE DIHYDRATE 160; 4.5 UG/1; UG/1
2 AEROSOL RESPIRATORY (INHALATION) 2 TIMES DAILY
Qty: 10.2 G | Refills: 5 | Status: SHIPPED | OUTPATIENT
Start: 2025-04-03

## 2025-04-03 RX ORDER — GUAIFENESIN 600 MG/1
1200 TABLET, EXTENDED RELEASE ORAL EVERY 12 HOURS PRN
COMMUNITY
Start: 2025-04-03

## 2025-04-03 RX ORDER — ALBUTEROL SULFATE 90 UG/1
2 INHALANT RESPIRATORY (INHALATION) EVERY 4 HOURS PRN
Qty: 18 G | Refills: 5 | Status: SHIPPED | OUTPATIENT
Start: 2025-04-03
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
## 2025-04-07 ENCOUNTER — OFFICE VISIT (OUTPATIENT)
Dept: PODIATRY | Facility: CLINIC | Age: 75
End: 2025-04-07
Payer: MEDICARE

## 2025-04-07 ENCOUNTER — APPOINTMENT (OUTPATIENT)
Dept: RADIOLOGY | Facility: MEDICAL CENTER | Age: 75
End: 2025-04-07
Payer: MEDICARE

## 2025-04-07 VITALS
HEART RATE: 92 BPM | OXYGEN SATURATION: 98 % | BODY MASS INDEX: 27.94 KG/M2 | TEMPERATURE: 98.2 F | WEIGHT: 148 LBS | HEIGHT: 61 IN | RESPIRATION RATE: 16 BRPM

## 2025-04-07 DIAGNOSIS — S92.351A CLOSED FRACTURE OF BASE OF FIFTH METATARSAL BONE OF RIGHT FOOT: Primary | ICD-10-CM

## 2025-04-07 DIAGNOSIS — S90.511A ABRASION OF RIGHT ANKLE, INITIAL ENCOUNTER: ICD-10-CM

## 2025-04-07 DIAGNOSIS — S92.351A CLOSED FRACTURE OF BASE OF FIFTH METATARSAL BONE OF RIGHT FOOT: ICD-10-CM

## 2025-04-07 PROCEDURE — 73630 X-RAY EXAM OF FOOT: CPT

## 2025-04-07 PROCEDURE — 99212 OFFICE O/P EST SF 10 MIN: CPT | Performed by: PODIATRIST

## 2025-04-07 NOTE — PROGRESS NOTES
Name: Shell Mccann      : 1950      MRN: [id number]  Encounter Provider: Wandy Espinosa DPM  Encounter Date: 2025   Encounter department: [facility]ODI[facility] [place]  :  Assessment & Plan  Closed fracture of base of fifth metatarsal bone of right foot    Orders:    X-ray foot right 3+ views; Future      Imaging Reviewed at this visit (I personally reviewed/independently interpreted images and reports in PACS)  XR right foot nonweightbearing 3v 24 date: Minimally displaced fracture at the base of the right fifth metatarsal.  No diastases or changes from last exam.  Trace callus formation  XR right foot nonweightbearing 3v 25 date: Minimally displaced fracture at the base of the right fifth metatarsal.  No diastases or changes from last exam.  Trace callus formation, no significant consolidation noted yet  XR right foot nonweightbearing 3v 2025 date: No diastases from last exam.  No significant consolidation noted yet.  Mild callusing appreciated  XR right foot nonweightbearing 3v 3/14/2025 date: No diastases from last exam. Interval healing noted.  3/7/2025: X-ray right foot, fracture stable, interval healing noted        IMPRESSION:  RLE close fifth metatarsal base fracture, interval healing  RLE anterior ankle abrasion, no SOI     PLAN:  RLE 5th met fracture with interval healing now noted   Patient is asymptomatic with clinical exam, no pain reported  Right anterior ankle abrasion from ambulating in cam boot  Interval healing noted on x-ray, discussed transition to stiff bottom shoe with minimal ADLs and use of assistive devices.  Continue with wound care to abrasion, cleanse area daily with warm soapy water.  Dry.  Apply mupirocin 2% ointment and keep covered with adhesive dressing  Patient counseled on clinical signs of infection, patient is traveling to [place] and if any questions or concerns arise she should present to urgent care for further evaluation and contact the  "office  Continue with bone stimulator  Patient counseled on utilizing vitamin D, high-protein diet, and calcium intake.  Follow-up 2 months after completion of bone stimulator for repeat x-ray and final evaluation/      History of Present Illness   HPI  Shell Mccann is a 74 y.o. female who presents continued evaluation and management of right foot fifth metatarsal fracture.  Patient has been utilizing bone stimulator.  She has been minimally weightbearing to right lower extremity with use of cam boot.  She states she has been adherent to her activities of daily living only.  She has been taking vitamin D.  Patient developed an abrasion from rubbing on the cam boot approximately 1 week ago.  She contact the office and I sent an antibiotic ointment for basic wound care.  She states she has been using ointment as directed.  She denies any local or systemic signs of infection.  She presents today for further evaluation and management      Review of Systems  Constitutional:  Negative for chills and fever.   Respiratory:  Negative for shortness of breath.    Cardiovascular:  Negative for chest pain and leg swelling.   Musculoskeletal:  Positive for gait problem.        Objective   Pulse 92   Temp 98.2 °F (36.8 °C)   Resp 16   Ht 5' 1\" (1.549 m)   Wt 67.1 kg (148 lb)   SpO2 98%   BMI 27.96 kg/m²      Physical Exam  Comments: dP/PT pulse 2/4  CRT brisk  Decreased temperature gradient from knees to digits  Musculoskeletal:         General: No pain with palpation of right fifth metatarsal base or shaft.  No pain with palpation along peroneals.  No pain with active plantarflexion, dorsiflexion, inversion, eversion.  No edema  Skin:     Capillary Refill: Capillary refill takes 2 to 3 seconds.         Comments: Abrasion anterior lateral ankle right lower extremity.  No surrounding erythema or edema.  No drainage.  No crepitus, no fluctuance, no SOI      "

## 2025-05-06 ENCOUNTER — APPOINTMENT (OUTPATIENT)
Dept: LAB | Facility: CLINIC | Age: 75
End: 2025-05-06
Attending: STUDENT IN AN ORGANIZED HEALTH CARE EDUCATION/TRAINING PROGRAM
Payer: MEDICARE

## 2025-05-06 DIAGNOSIS — Z13.0 SCREENING, ANEMIA, DEFICIENCY, IRON: ICD-10-CM

## 2025-05-06 DIAGNOSIS — E78.00 HYPERCHOLESTEROLEMIA: ICD-10-CM

## 2025-05-06 DIAGNOSIS — E03.9 HYPOTHYROIDISM, UNSPECIFIED TYPE: ICD-10-CM

## 2025-05-06 DIAGNOSIS — E78.00 PURE HYPERCHOLESTEROLEMIA: ICD-10-CM

## 2025-05-06 DIAGNOSIS — Z79.890 NEED FOR PROPHYLACTIC HORMONE REPLACEMENT THERAPY (POSTMENOPAUSAL): ICD-10-CM

## 2025-05-06 DIAGNOSIS — G43.001 MIGRAINE WITHOUT AURA AND WITH STATUS MIGRAINOSUS, NOT INTRACTABLE: ICD-10-CM

## 2025-05-06 DIAGNOSIS — M81.0 OSTEOPOROSIS, UNSPECIFIED OSTEOPOROSIS TYPE, UNSPECIFIED PATHOLOGICAL FRACTURE PRESENCE: ICD-10-CM

## 2025-05-06 DIAGNOSIS — N95.1 SYMPTOMATIC MENOPAUSAL OR FEMALE CLIMACTERIC STATES: ICD-10-CM

## 2025-05-06 DIAGNOSIS — M81.0 SENILE OSTEOPOROSIS: ICD-10-CM

## 2025-05-06 LAB
25(OH)D3 SERPL-MCNC: 57.7 NG/ML (ref 30–100)
ALBUMIN SERPL BCG-MCNC: 4.3 G/DL (ref 3.5–5)
ALP SERPL-CCNC: 67 U/L (ref 34–104)
ALT SERPL W P-5'-P-CCNC: 34 U/L (ref 7–52)
ANION GAP SERPL CALCULATED.3IONS-SCNC: 9 MMOL/L (ref 4–13)
AST SERPL W P-5'-P-CCNC: 28 U/L (ref 13–39)
BASOPHILS # BLD AUTO: 0.03 THOUSANDS/ÂΜL (ref 0–0.1)
BASOPHILS NFR BLD AUTO: 0 % (ref 0–1)
BILIRUB SERPL-MCNC: 0.39 MG/DL (ref 0.2–1)
BUN SERPL-MCNC: 18 MG/DL (ref 5–25)
CALCIUM SERPL-MCNC: 10.2 MG/DL (ref 8.4–10.2)
CHLORIDE SERPL-SCNC: 108 MMOL/L (ref 96–108)
CHOLEST SERPL-MCNC: 156 MG/DL (ref ?–200)
CO2 SERPL-SCNC: 27 MMOL/L (ref 21–32)
CORTIS SERPL-MCNC: 15.2 UG/DL
CREAT SERPL-MCNC: 0.69 MG/DL (ref 0.6–1.3)
EOSINOPHIL # BLD AUTO: 0.14 THOUSAND/ÂΜL (ref 0–0.61)
EOSINOPHIL NFR BLD AUTO: 2 % (ref 0–6)
ERYTHROCYTE [DISTWIDTH] IN BLOOD BY AUTOMATED COUNT: 13 % (ref 11.6–15.1)
EST. AVERAGE GLUCOSE BLD GHB EST-MCNC: 111 MG/DL
ESTRADIOL SERPL-MCNC: 25.7 PG/ML
GFR SERPL CREATININE-BSD FRML MDRD: 87 ML/MIN/1.73SQ M
GLUCOSE P FAST SERPL-MCNC: 89 MG/DL (ref 65–99)
HBA1C MFR BLD: 5.5 %
HCT VFR BLD AUTO: 48.4 % (ref 34.8–46.1)
HDLC SERPL-MCNC: 57 MG/DL
HGB BLD-MCNC: 15.7 G/DL (ref 11.5–15.4)
IMM GRANULOCYTES # BLD AUTO: 0.02 THOUSAND/UL (ref 0–0.2)
IMM GRANULOCYTES NFR BLD AUTO: 0 % (ref 0–2)
LDLC SERPL CALC-MCNC: 72 MG/DL (ref 0–100)
LYMPHOCYTES # BLD AUTO: 1.78 THOUSANDS/ÂΜL (ref 0.6–4.47)
LYMPHOCYTES NFR BLD AUTO: 20 % (ref 14–44)
MCH RBC QN AUTO: 30.2 PG (ref 26.8–34.3)
MCHC RBC AUTO-ENTMCNC: 32.4 G/DL (ref 31.4–37.4)
MCV RBC AUTO: 93 FL (ref 82–98)
MONOCYTES # BLD AUTO: 0.56 THOUSAND/ÂΜL (ref 0.17–1.22)
MONOCYTES NFR BLD AUTO: 6 % (ref 4–12)
NEUTROPHILS # BLD AUTO: 6.46 THOUSANDS/ÂΜL (ref 1.85–7.62)
NEUTS SEG NFR BLD AUTO: 72 % (ref 43–75)
NRBC BLD AUTO-RTO: 0 /100 WBCS
PLATELET # BLD AUTO: 243 THOUSANDS/UL (ref 149–390)
PMV BLD AUTO: 11.7 FL (ref 8.9–12.7)
POTASSIUM SERPL-SCNC: 4.5 MMOL/L (ref 3.5–5.3)
PROGEST SERPL-MCNC: 0.78 NG/ML
PROT SERPL-MCNC: 6.9 G/DL (ref 6.4–8.4)
PTH-INTACT SERPL-MCNC: 44.9 PG/ML (ref 12–88)
RBC # BLD AUTO: 5.2 MILLION/UL (ref 3.81–5.12)
SODIUM SERPL-SCNC: 144 MMOL/L (ref 135–147)
T3FREE SERPL-MCNC: 3.01 PG/ML (ref 2.5–3.9)
T4 FREE SERPL-MCNC: 0.67 NG/DL (ref 0.61–1.12)
TRIGL SERPL-MCNC: 134 MG/DL (ref ?–150)
TSH SERPL DL<=0.05 MIU/L-ACNC: 1.03 UIU/ML (ref 0.45–4.5)
VIT B12 SERPL-MCNC: 1268 PG/ML (ref 180–914)
WBC # BLD AUTO: 8.99 THOUSAND/UL (ref 4.31–10.16)

## 2025-05-06 PROCEDURE — 82679 ASSAY OF ESTRONE: CPT

## 2025-05-06 PROCEDURE — 84481 FREE ASSAY (FT-3): CPT

## 2025-05-06 PROCEDURE — 84403 ASSAY OF TOTAL TESTOSTERONE: CPT

## 2025-05-06 PROCEDURE — 85025 COMPLETE CBC W/AUTO DIFF WBC: CPT

## 2025-05-06 PROCEDURE — 82627 DEHYDROEPIANDROSTERONE: CPT

## 2025-05-06 PROCEDURE — 83970 ASSAY OF PARATHORMONE: CPT

## 2025-05-06 PROCEDURE — 83036 HEMOGLOBIN GLYCOSYLATED A1C: CPT

## 2025-05-06 PROCEDURE — 82670 ASSAY OF TOTAL ESTRADIOL: CPT

## 2025-05-06 PROCEDURE — 82533 TOTAL CORTISOL: CPT

## 2025-05-06 PROCEDURE — 84402 ASSAY OF FREE TESTOSTERONE: CPT

## 2025-05-06 PROCEDURE — 82607 VITAMIN B-12: CPT

## 2025-05-06 PROCEDURE — 36415 COLL VENOUS BLD VENIPUNCTURE: CPT

## 2025-05-06 PROCEDURE — 80053 COMPREHEN METABOLIC PANEL: CPT

## 2025-05-06 PROCEDURE — 84144 ASSAY OF PROGESTERONE: CPT

## 2025-05-06 PROCEDURE — 80061 LIPID PANEL: CPT

## 2025-05-06 PROCEDURE — 82306 VITAMIN D 25 HYDROXY: CPT

## 2025-05-06 PROCEDURE — 84439 ASSAY OF FREE THYROXINE: CPT

## 2025-05-06 PROCEDURE — 84443 ASSAY THYROID STIM HORMONE: CPT

## 2025-05-08 ENCOUNTER — OFFICE VISIT (OUTPATIENT)
Dept: ENDOCRINOLOGY | Facility: CLINIC | Age: 75
End: 2025-05-08
Payer: MEDICARE

## 2025-05-08 VITALS
DIASTOLIC BLOOD PRESSURE: 90 MMHG | WEIGHT: 149 LBS | BODY MASS INDEX: 28.13 KG/M2 | HEIGHT: 61 IN | OXYGEN SATURATION: 99 % | HEART RATE: 86 BPM | SYSTOLIC BLOOD PRESSURE: 138 MMHG | TEMPERATURE: 97 F

## 2025-05-08 DIAGNOSIS — E03.9 HYPOTHYROIDISM, UNSPECIFIED TYPE: ICD-10-CM

## 2025-05-08 DIAGNOSIS — M81.0 OSTEOPOROSIS, UNSPECIFIED OSTEOPOROSIS TYPE, UNSPECIFIED PATHOLOGICAL FRACTURE PRESENCE: Primary | ICD-10-CM

## 2025-05-08 DIAGNOSIS — E28.319 PREMATURE MENOPAUSE: ICD-10-CM

## 2025-05-08 DIAGNOSIS — Z87.19 HISTORY OF DENTAL PROBLEMS: ICD-10-CM

## 2025-05-08 DIAGNOSIS — Z79.83 LONG TERM USE OF BISPHOSPHONATES: ICD-10-CM

## 2025-05-08 LAB — DHEA-S SERPL-MCNC: 22.4 UG/DL (ref 20.4–186.6)

## 2025-05-08 PROCEDURE — 99214 OFFICE O/P EST MOD 30 MIN: CPT | Performed by: STUDENT IN AN ORGANIZED HEALTH CARE EDUCATION/TRAINING PROGRAM

## 2025-05-08 PROCEDURE — G2211 COMPLEX E/M VISIT ADD ON: HCPCS | Performed by: STUDENT IN AN ORGANIZED HEALTH CARE EDUCATION/TRAINING PROGRAM

## 2025-05-08 NOTE — PROGRESS NOTES
Name: Shell Mccann      : 1950      MRN: [id number]  Encounter Provider: Pete Madrigal DO  Encounter Date: 2025   Encounter department: [facility]    No chief complaint on file.  :  Assessment & Plan  Osteoporosis, unspecified osteoporosis type, unspecified pathological fracture presence  - History of surgical menopause and chronic bisphosphonate use, with a recent bone density test showing a T-score of -2.9 in the lumbar spine.  - Currently on Forteo (teriparatide) and vitamin D supplements.  - Potential impact of long-term bisphosphonate use on the effectiveness of Forteo discussed. Metabolic panel and thyroid tests are normal, B12 and hemoglobin levels elevated, possibly due to sleep apnea or asthma. Calcium levels are within the normal range.  - Continue Forteo for another year, after which a bone density test will be conducted to assess its effectiveness. If bones are no longer osteoporotic, transitioning to another medication may be considered. If there is no full response, repeating the course of Forteo may be an option, depending on insurance coverage.    Follow-up: The patient will follow up in 1 year.    Orders:  •  Comprehensive metabolic panel; Future  •  Vitamin D 25 hydroxy; Future    Long term use of bisphosphonates         History of dental problems  No plans for dental intervention       Premature menopause  This is a risk factor for osteoporosis       Hypothyroidism, unspecified type  Well controlled on therapy  Orders:  •  TSH, 3rd generation; Future        History of Present Illness   History of Present Illness  The patient is a 74-year-old female here today for a routine follow-up of osteoporosis. There is a history of surgical menopause around age 26 with hysterectomy and bilateral salpingo-oophorectomy. She is on chronic hormone replacement therapy (HRT) and follows with an OB/GYN. She also has a history of chronic  bisphosphonate use, reporting monthly ibandronate use from the late 1990s onwards, and a history of alendronate use as well. There is a history of fractures of the fingers, ankle, shoulder, and right leg. She reports being a fall risk. Additionally, there is a history of rectal prolapse, which has been a source of frustration.    Her last bone density test was on 10/15/2024 and showed a T-score of -2.9 in the lumbar spine, -2.0 in the left total hip, -2.5 in the left femoral neck, -1.9 in the right total hip, and -2.1 in the right femoral neck. She had been considered for treatment with Reclast, but this was declined due to concerns raised by her periodontist. She is no longer in need of dental procedures.    Currently, she is on Forteo injections and vitamin D supplements. She has been using an electric machine to stimulate bone growth following a foot fracture sustained during a fall on [employer] Initially diagnosed as a bruise, she continued to walk on it for a week before being informed of a misdiagnosis. She was subsequently placed in a boot for 5 months, which she recently removed 2 weeks ago. She continues to use the electric machine for 30 minutes daily. She has been unable to exercise due to her foot injury.    She has completed her dental implants on the lower jaw.    There is a history of elevated hemoglobin levels, which were previously evaluated at a hematology clinic and found to be within normal limits.    She has sleep apnea and asthma, for which she uses inhalers. She has reduced her inhaler usage to one.    PAST SURGICAL HISTORY: Hysterectomy and bilateral salpingo-oophorectomy.  Pertinent Medical History         Review of Systems as per HPI       Medical History Reviewed by provider this encounter:  Tobacco  Allergies  Meds  Problems  Med Hx  Surg Hx  Fam Hx     .    Objective   /90 (BP Location: Left arm, Patient Position: Sitting)   Pulse 86   Temp (!) 97 °F (36.1 °C)   Ht 5'  "1\" (1.549 m)   Wt 67.6 kg (149 lb)   SpO2 99%   BMI 28.15 kg/m²      Body mass index is 28.15 kg/m².  Wt Readings from Last 3 Encounters:   05/08/25 67.6 kg (149 lb)   04/07/25 67.1 kg (148 lb)   03/14/25 67.1 kg (148 lb)     Physical Exam  Vitals reviewed.   Constitutional:       General: She is not in acute distress.     Appearance: Normal appearance.   HENT:      Head: Normocephalic and atraumatic.   Eyes:      General: No scleral icterus.     Conjunctiva/sclera: Conjunctivae normal.   Pulmonary:      Effort: Pulmonary effort is normal. No respiratory distress.   Musculoskeletal:         General: No deformity.      Cervical back: Normal range of motion.   Neurological:      General: No focal deficit present.      Mental Status: She is alert.   Psychiatric:         Mood and Affect: Mood normal.         Behavior: Behavior normal.         Results  Labs:  - Metabolic panel: Normal  - Thyroid testing: Normal  - B12: High  - Hemoglobin: High    Imaging:  - Bone density test (10/15/2024): T-score of -2.9 in the lumbar spine, -2.0 in the left total hip, -2.5 in the left femoral neck, -1.9 in the right total hip, and -2.1 in the right femoral neck  Labs:   Lab Results   Component Value Date    HGBA1C 5.5 05/06/2025    HGBA1C 5.7 (H) 04/30/2024    HGBA1C 5.3 12/30/2022     Lab Results   Component Value Date    CREATININE 0.69 05/06/2025    CREATININE 0.69 11/27/2024    CREATININE 0.65 04/30/2024    BUN 18 05/06/2025    K 4.5 05/06/2025     05/06/2025    CO2 27 05/06/2025     eGFRcr   Date Value Ref Range Status   02/20/2023 82 >=60 mL/min/1.73 m2 Final     Comment:     Estimated glomerular filtration rate (eGFR) is calculated without a race  coefficient. Values should be interpreted in the context of the patient's full  clinical presentation. Reference: Kathya Pastor et al. \"A unifying approach  for GFR estimation: recommendations of the NKF-ASN task force on reassessing the  inclusion of race in diagnosing " "kidney disease.\" American Journal of Kidney  Diseases (2021)     eGFR   Date Value Ref Range Status   05/06/2025 87 ml/min/1.73sq m Final     Lab Results   Component Value Date    HDL 57 05/06/2025    TRIG 134 05/06/2025     Lab Results   Component Value Date    ALT 34 05/06/2025    AST 28 05/06/2025    ALKPHOS 67 05/06/2025     Lab Results   Component Value Date    VPW9MRDPBEYL 1.031 05/06/2025    FRL6ZXYPSHIR 2.032 04/30/2024    YFT4MNLKPPXN 1.919 07/25/2023     Lab Results   Component Value Date    FREET4 0.67 05/06/2025     Component      Latest Ref Rng 5/6/2025   Sodium      135 - 147 mmol/L 144    Potassium      3.5 - 5.3 mmol/L 4.5    Chloride      96 - 108 mmol/L 108    Carbon Dioxide      21 - 32 mmol/L 27    ANION GAP      4 - 13 mmol/L 9    BUN      5 - 25 mg/dL 18    Creatinine      0.60 - 1.30 mg/dL 0.69    GLUCOSE, FASTING      65 - 99 mg/dL 89    Calcium      8.4 - 10.2 mg/dL 10.2    AST      13 - 39 U/L 28    ALT      7 - 52 U/L 34    ALK PHOS      34 - 104 U/L 67    Total Protein      6.4 - 8.4 g/dL 6.9    Albumin      3.5 - 5.0 g/dL 4.3    Total Bilirubin      0.20 - 1.00 mg/dL 0.39    GFR, Calculated      ml/min/1.73sq m 87    PTH      12.0 - 88.0 pg/mL 44.9    VITAMIN D, 25-HYDROXY      30.0 - 100.0 ng/mL 57.7    TSH 3RD GENERATON      0.450 - 4.500 uIU/mL 1.031    FREE T3      2.50 - 3.90 pg/mL 3.01    FREE T4      0.61 - 1.12 ng/dL 0.67        There are no Patient Instructions on file for this visit.    Discussed with the patient and all questioned fully answered. She will call me if any problems arise.      "

## 2025-05-09 ENCOUNTER — RESULTS FOLLOW-UP (OUTPATIENT)
Dept: INTERNAL MEDICINE CLINIC | Facility: CLINIC | Age: 75
End: 2025-05-09

## 2025-05-09 LAB
ESTRONE SERPL-MCNC: <6 PG/ML (ref 0–125)
TESTOST FREE SERPL-MCNC: 1.2 PG/ML (ref 0–4.2)
TESTOST SERPL-MCNC: 33 NG/DL (ref 3–67)

## 2025-05-25 DIAGNOSIS — M81.0 OSTEOPOROSIS, UNSPECIFIED OSTEOPOROSIS TYPE, UNSPECIFIED PATHOLOGICAL FRACTURE PRESENCE: ICD-10-CM

## 2025-05-27 RX ORDER — TERIPARATIDE 250 UG/ML
20 INJECTION, SOLUTION SUBCUTANEOUS DAILY
Qty: 2.48 ML | Refills: 3 | Status: SHIPPED | OUTPATIENT
Start: 2025-05-27 | End: 2026-05-22

## 2025-06-13 ENCOUNTER — APPOINTMENT (OUTPATIENT)
Dept: RADIOLOGY | Facility: MEDICAL CENTER | Age: 75
End: 2025-06-13
Attending: PODIATRIST
Payer: MEDICARE

## 2025-06-13 ENCOUNTER — OFFICE VISIT (OUTPATIENT)
Dept: PODIATRY | Facility: CLINIC | Age: 75
End: 2025-06-13
Payer: MEDICARE

## 2025-06-13 VITALS
WEIGHT: 148 LBS | TEMPERATURE: 98.5 F | OXYGEN SATURATION: 98 % | BODY MASS INDEX: 27.94 KG/M2 | HEART RATE: 71 BPM | RESPIRATION RATE: 16 BRPM | HEIGHT: 61 IN

## 2025-06-13 DIAGNOSIS — S92.351A CLOSED FRACTURE OF BASE OF FIFTH METATARSAL BONE OF RIGHT FOOT: Primary | ICD-10-CM

## 2025-06-13 DIAGNOSIS — S92.351A CLOSED FRACTURE OF BASE OF FIFTH METATARSAL BONE OF RIGHT FOOT: ICD-10-CM

## 2025-06-13 PROCEDURE — 99213 OFFICE O/P EST LOW 20 MIN: CPT | Performed by: PODIATRIST

## 2025-06-13 PROCEDURE — 73630 X-RAY EXAM OF FOOT: CPT

## 2025-06-13 NOTE — PROGRESS NOTES
"Name: Shell Mccann      : 1950      MRN: [id number]  Encounter Provider: Wandy Espinosa DPM  Encounter Date: 2025   Encounter [facility]  :  Assessment & Plan  Closed fracture of base of fifth metatarsal bone of right foot    Orders:    XR foot 3+ vw right; Future      Imaging Reviewed at this visit (I personally reviewed/independently interpreted images and reports in PACS)  XR right foot weightbearing 3 view 2025: Consolidation noted of minimally displaced fifth metatarsal base fracture.    IMPRESSION:  RLE close fifth metatarsal base fracture, healed  RLE anterior ankle abrasion, resolved     PLAN:  RLE fifth metatarsal base fracture healed at today's evaluation  Patient may return to full activity  Patient counseled on use of supportive shoe gear and arch support  Endocrinology note reviewed 2025, continue with osteoporosis management  Osteoporosis and slow bone healing reviewed  Anterior ankle abrasion healed  Patient counseled to use moisturizers regularly to maintain skin.  Follow-up as needed      History of Present Illness   HPI  Shell Mccann is a 74 y.o. female who presents for evaluation management of right foot fifth metatarsal base fracture.  Patient states she has no pain.  She states she has been performing activities of daily living and supportive shoes without pain or irritation.  Patient is eager to begin exercising again  Patient denies any redness, swelling or other signs of irritation      Review of Systems  Constitutional:  Negative for chills and fever.   Respiratory:  Negative for shortness of breath.    Cardiovascular:  Negative for chest pain and leg swelling.   Musculoskeletal: Negative       Objective   Pulse 71   Temp 98.5 °F (36.9 °C) (Temporal)   Resp 16   Ht 5' 1\" (1.549 m)   Wt 67.1 kg (148 lb)   SpO2 98%   BMI 27.96 kg/m²      Physical Exam  Comments: dP/PT pulse 2/4  CRT brisk  Decreased temperature gradient " from knees to digits  Musculoskeletal:         General: No pain with palpation of right fifth metatarsal base or shaft.  No pain with palpation along peroneals.  No pain with active plantarflexion, dorsiflexion, inversion, eversion.  No edema  Skin:     Capillary Refill: Capillary refill takes 2 to 3 seconds.         Comments: No open wounds

## 2025-06-17 ENCOUNTER — ANNUAL EXAM (OUTPATIENT)
Dept: OBGYN CLINIC | Facility: MEDICAL CENTER | Age: 75
End: 2025-06-17
Payer: MEDICARE

## 2025-06-17 VITALS
WEIGHT: 148 LBS | SYSTOLIC BLOOD PRESSURE: 128 MMHG | DIASTOLIC BLOOD PRESSURE: 80 MMHG | HEIGHT: 61 IN | BODY MASS INDEX: 27.94 KG/M2

## 2025-06-17 DIAGNOSIS — Z01.419 ENCOUNTER FOR GYNECOLOGICAL EXAMINATION: Primary | ICD-10-CM

## 2025-06-17 DIAGNOSIS — Z12.31 ENCOUNTER FOR SCREENING MAMMOGRAM FOR BREAST CANCER: ICD-10-CM

## 2025-06-17 DIAGNOSIS — N94.10 DYSPAREUNIA IN FEMALE: ICD-10-CM

## 2025-06-17 DIAGNOSIS — K43.2 INCISIONAL HERNIA, WITHOUT OBSTRUCTION OR GANGRENE: ICD-10-CM

## 2025-06-17 PROCEDURE — G0101 CA SCREEN;PELVIC/BREAST EXAM: HCPCS | Performed by: OBSTETRICS & GYNECOLOGY

## 2025-06-17 RX ORDER — ESTRADIOL 0.1 MG/G
0.5 CREAM VAGINAL 2 TIMES WEEKLY
Qty: 42.5 G | Refills: 0 | Status: SHIPPED | OUTPATIENT
Start: 2025-06-19

## 2025-06-17 NOTE — PROGRESS NOTES
"Name: Shell Mccann      : 1950      MRN: [id number]  Encounter Provider: Marilu Huitron MD  Encounter Date: 2025   Encounter department: [facility]  :  Assessment & Plan  Incisional hernia, without obstruction or gangrene    Orders:  •  Ambulatory Referral to General Surgery; Future  palpable hernia at  site  of  laparotomy  -  referral  to Gen surg   Encounter for screening mammogram for breast cancer    Orders:  •  Mammo screening bilateral w 3d and cad; Future    Encounter for gynecological examination       mammo in   - referral given   Colonoscopy  up to date    Pap - not  indicated - hysterectomy   Feels safe at home   Known  osteoporosis  -  discussed weight bearing and  options   Painful intercourse  - discussed estrogen cream  Dyspareunia in female    Orders:  •  estradiol (ESTRACE VAGINAL) 0.1 mg/g vaginal cream; Insert 0.5 g into the vagina 2 (two) times a week        History of Present Illness   HPI  Shell Mccann is a 74 y.o. female who presents for annual  gyn    No GYN complains   Can feel 2  lumps  on  her  abdomen that she has felt since most  recent  laparotomy     History obtained from: patient    Review of Systems   Constitutional: Negative.    HENT: Negative.     Respiratory: Negative.     Cardiovascular: Negative.    Genitourinary:  Positive for dyspareunia. Negative for decreased urine volume, difficulty urinating, dysuria, enuresis, flank pain, frequency, genital sores, hematuria, menstrual problem, pelvic pain, urgency, vaginal bleeding, vaginal discharge and vaginal pain.   Psychiatric/Behavioral: Negative.          Objective   /80   Ht 5' 1\" (1.549 m)   Wt 67.1 kg (148 lb)   BMI 27.96 kg/m²      Physical Exam  Constitutional:       Appearance: Normal appearance.   HENT:      Head: Normocephalic and atraumatic.      Nose: Nose normal.     Eyes:      Conjunctiva/sclera: Conjunctivae normal.     Pulmonary:      " Effort: Pulmonary effort is normal.   Chest:   Breasts:     Right: Normal. No swelling, bleeding, inverted nipple, mass, nipple discharge, skin change or tenderness.      Left: Normal. No swelling, bleeding, inverted nipple, mass, nipple discharge, skin change or tenderness.   Abdominal:      General: There is no distension.      Palpations: There is no mass.      Tenderness: There is no abdominal tenderness. There is no guarding or rebound.      Hernia: A hernia is present.   Genitourinary:     General: Normal vulva.      Vagina: Normal.      Uterus: Absent.       Adnexa: Right adnexa normal and left adnexa normal.      Comments: Atrophic mucosa    Neurological:      General: No focal deficit present.      Mental Status: She is alert and oriented to person, place, and time.     Psychiatric:         Mood and Affect: Mood normal.         Behavior: Behavior normal.

## 2025-06-18 ENCOUNTER — ESTABLISHED COMPREHENSIVE EXAM (OUTPATIENT)
Dept: URBAN - METROPOLITAN AREA CLINIC 6 | Facility: CLINIC | Age: 75
End: 2025-06-18

## 2025-06-18 DIAGNOSIS — Z96.1: ICD-10-CM

## 2025-06-18 PROCEDURE — 92015 DETERMINE REFRACTIVE STATE: CPT

## 2025-06-18 PROCEDURE — 92014 COMPRE OPH EXAM EST PT 1/>: CPT

## 2025-06-18 ASSESSMENT — TONOMETRY
OD_IOP_MMHG: 9
OS_IOP_MMHG: 10

## 2025-06-18 ASSESSMENT — VISUAL ACUITY
OD_CC: 20/30
OU_CC: J2

## 2025-07-14 ENCOUNTER — CONSULT (OUTPATIENT)
Age: 75
End: 2025-07-14
Attending: OBSTETRICS & GYNECOLOGY
Payer: MEDICARE

## 2025-07-14 VITALS
RESPIRATION RATE: 18 BRPM | DIASTOLIC BLOOD PRESSURE: 78 MMHG | SYSTOLIC BLOOD PRESSURE: 122 MMHG | OXYGEN SATURATION: 97 % | BODY MASS INDEX: 28.32 KG/M2 | HEIGHT: 61 IN | WEIGHT: 150 LBS | HEART RATE: 70 BPM | TEMPERATURE: 97.8 F

## 2025-07-14 DIAGNOSIS — M62.08 RECTUS DIASTASIS: ICD-10-CM

## 2025-07-14 DIAGNOSIS — K59.04 CHRONIC IDIOPATHIC CONSTIPATION: ICD-10-CM

## 2025-07-14 DIAGNOSIS — K43.2 INCISIONAL HERNIA, WITHOUT OBSTRUCTION OR GANGRENE: Primary | ICD-10-CM

## 2025-07-14 PROCEDURE — 99204 OFFICE O/P NEW MOD 45 MIN: CPT | Performed by: SURGERY

## 2025-07-15 DIAGNOSIS — G43.809 OTHER MIGRAINE WITHOUT STATUS MIGRAINOSUS, NOT INTRACTABLE: ICD-10-CM

## 2025-07-16 RX ORDER — BUTALBITAL, ACETAMINOPHEN AND CAFFEINE 50; 325; 40 MG/1; MG/1; MG/1
1 TABLET ORAL EVERY 6 HOURS PRN
Qty: 60 TABLET | Refills: 1 | Status: SHIPPED | OUTPATIENT
Start: 2025-07-16

## 2025-07-18 ENCOUNTER — TELEPHONE (OUTPATIENT)
Age: 75
End: 2025-07-18

## 2025-07-30 ENCOUNTER — OFFICE VISIT (OUTPATIENT)
Dept: INTERNAL MEDICINE CLINIC | Facility: CLINIC | Age: 75
End: 2025-07-30
Payer: MEDICARE

## 2025-07-30 VITALS
HEIGHT: 61 IN | HEART RATE: 81 BPM | WEIGHT: 153 LBS | BODY MASS INDEX: 28.89 KG/M2 | OXYGEN SATURATION: 95 % | DIASTOLIC BLOOD PRESSURE: 82 MMHG | SYSTOLIC BLOOD PRESSURE: 126 MMHG | TEMPERATURE: 98.3 F

## 2025-07-30 DIAGNOSIS — I10 PRIMARY HYPERTENSION: ICD-10-CM

## 2025-07-30 DIAGNOSIS — E66.3 OVERWEIGHT (BMI 25.0-29.9): Primary | ICD-10-CM

## 2025-07-30 DIAGNOSIS — E78.00 HYPERCHOLESTEROLEMIA: ICD-10-CM

## 2025-07-30 PROCEDURE — 99214 OFFICE O/P EST MOD 30 MIN: CPT | Performed by: INTERNAL MEDICINE

## 2025-07-30 PROCEDURE — G2211 COMPLEX E/M VISIT ADD ON: HCPCS | Performed by: INTERNAL MEDICINE

## 2025-07-31 ENCOUNTER — DOCUMENTATION (OUTPATIENT)
Dept: PULMONOLOGY | Facility: MEDICAL CENTER | Age: 75
End: 2025-07-31

## 2025-07-31 ENCOUNTER — TELEPHONE (OUTPATIENT)
Age: 75
End: 2025-07-31

## 2025-07-31 DIAGNOSIS — J45.40 MODERATE PERSISTENT ASTHMA WITHOUT COMPLICATION: Primary | ICD-10-CM

## 2025-07-31 RX ORDER — BUDESONIDE AND FORMOTEROL FUMARATE DIHYDRATE 160; 4.5 UG/1; UG/1
2 AEROSOL RESPIRATORY (INHALATION) 2 TIMES DAILY
Qty: 10.2 G | Refills: 5 | Status: SHIPPED | OUTPATIENT
Start: 2025-07-31

## 2025-08-08 PROBLEM — M62.08 RECTUS DIASTASIS: Status: ACTIVE | Noted: 2025-08-08

## 2025-08-08 PROBLEM — K43.2 INCISIONAL HERNIA, WITHOUT OBSTRUCTION OR GANGRENE: Status: ACTIVE | Noted: 2025-08-08

## 2025-08-11 ENCOUNTER — TELEPHONE (OUTPATIENT)
Age: 75
End: 2025-08-11